# Patient Record
Sex: MALE | Race: WHITE | NOT HISPANIC OR LATINO | Employment: FULL TIME | URBAN - METROPOLITAN AREA
[De-identification: names, ages, dates, MRNs, and addresses within clinical notes are randomized per-mention and may not be internally consistent; named-entity substitution may affect disease eponyms.]

---

## 2017-03-10 ENCOUNTER — OFFICE VISIT (OUTPATIENT)
Dept: INTERNAL MEDICINE | Facility: CLINIC | Age: 57
End: 2017-03-10
Attending: INTERNAL MEDICINE
Payer: COMMERCIAL

## 2017-03-10 ENCOUNTER — HOSPITAL ENCOUNTER (OUTPATIENT)
Dept: RADIOLOGY | Facility: OTHER | Age: 57
Discharge: HOME | End: 2017-03-10
Attending: INTERNAL MEDICINE
Payer: COMMERCIAL

## 2017-03-10 VITALS
DIASTOLIC BLOOD PRESSURE: 84 MMHG | HEART RATE: 85 BPM | OXYGEN SATURATION: 92 % | WEIGHT: 315 LBS | HEIGHT: 70 IN | SYSTOLIC BLOOD PRESSURE: 124 MMHG | TEMPERATURE: 98 F | BODY MASS INDEX: 45.1 KG/M2

## 2017-03-10 DIAGNOSIS — J06.9 UPPER RESPIRATORY TRACT INFECTION, UNSPECIFIED TYPE: ICD-10-CM

## 2017-03-10 DIAGNOSIS — J06.9 UPPER RESPIRATORY TRACT INFECTION, UNSPECIFIED TYPE: Primary | ICD-10-CM

## 2017-03-10 PROCEDURE — 99213 OFFICE O/P EST LOW 20 MIN: CPT | Mod: 25,S$GLB,, | Performed by: INTERNAL MEDICINE

## 2017-03-10 PROCEDURE — 3079F DIAST BP 80-89 MM HG: CPT | Mod: S$GLB,,, | Performed by: INTERNAL MEDICINE

## 2017-03-10 PROCEDURE — 71020 XR CHEST PA AND LATERAL: CPT | Mod: TC

## 2017-03-10 PROCEDURE — 3074F SYST BP LT 130 MM HG: CPT | Mod: S$GLB,,, | Performed by: INTERNAL MEDICINE

## 2017-03-10 PROCEDURE — 99999 PR PBB SHADOW E&M-EST. PATIENT-LVL III: CPT | Mod: PBBFAC,,, | Performed by: INTERNAL MEDICINE

## 2017-03-10 PROCEDURE — 71020 XR CHEST PA AND LATERAL: CPT | Mod: 26,,, | Performed by: RADIOLOGY

## 2017-03-10 PROCEDURE — 1160F RVW MEDS BY RX/DR IN RCRD: CPT | Mod: S$GLB,,, | Performed by: INTERNAL MEDICINE

## 2017-03-10 PROCEDURE — 96372 THER/PROPH/DIAG INJ SC/IM: CPT | Mod: S$GLB,,, | Performed by: INTERNAL MEDICINE

## 2017-03-10 RX ORDER — CODEINE PHOSPHATE AND GUAIFENESIN 10; 100 MG/5ML; MG/5ML
5-10 SOLUTION ORAL EVERY 4 HOURS PRN
Qty: 118 ML | Refills: 0 | Status: SHIPPED | OUTPATIENT
Start: 2017-03-10 | End: 2017-03-20

## 2017-03-10 RX ORDER — TRIAMCINOLONE ACETONIDE 40 MG/ML
40 INJECTION, SUSPENSION INTRA-ARTICULAR; INTRAMUSCULAR
Status: COMPLETED | OUTPATIENT
Start: 2017-03-10 | End: 2017-03-10

## 2017-03-10 RX ADMIN — TRIAMCINOLONE ACETONIDE 40 MG: 40 INJECTION, SUSPENSION INTRA-ARTICULAR; INTRAMUSCULAR at 11:03

## 2017-03-10 NOTE — PROGRESS NOTES
"Subjective:       Patient ID: Luís Torres is a 57 y.o. male.    Chief Complaint: URI    HPI Comments: Here for urgent visit    5 days prior started with post nasal drip, sore throat, cough-mostly dry and occasionally productive. Subjective fevers at night but has not checked temp. Taking cough syrup and mucinex. Requesting refill of cough syrup b/c it is several years . It is effective though.  Patient denies F/C, SOB, eye pain, severe HA, or inability to maintain adequate PO intake.         Review of Systems    Objective:      Vitals:    03/10/17 1108   BP: 124/84   Pulse: 85   Temp: 98.1 °F (36.7 °C)   TempSrc: Oral   SpO2: (!) 92%   Weight: (!) 148.1 kg (326 lb 8 oz)   Height: 5' 10" (1.778 m)      Physical Exam   Constitutional: He is oriented to person, place, and time. He appears well-developed and well-nourished.   HENT:   Head: Normocephalic and atraumatic.   Right Ear: Tympanic membrane, external ear and ear canal normal.   Left Ear: Tympanic membrane, external ear and ear canal normal.   Nose: No mucosal edema or rhinorrhea.   Mouth/Throat: Posterior oropharyngeal edema and posterior oropharyngeal erythema present. No oropharyngeal exudate.   Eyes: Conjunctivae and EOM are normal.   Pulmonary/Chest: Effort normal and breath sounds normal. No respiratory distress. He has no decreased breath sounds. He has no wheezes. He has no rales.   Abdominal: He exhibits no distension.   Musculoskeletal: He exhibits no edema.   Lymphadenopathy:     He has no cervical adenopathy.   Neurological: He is alert and oriented to person, place, and time.   Skin: Skin is warm and dry. No rash noted.       Assessment:       1. Upper respiratory tract infection, unspecified type        Plan:       Luís was seen today for uri.    Diagnoses and all orders for this visit:    Upper respiratory tract infection, unspecified type  -02 hovered 92-97% during our visit. This and subjective fevers, will rule out CAP despite normal " exam. Supportive care and office prompts discussed.  -     X-Ray Chest PA And Lateral; Future  -     guaifenesin-codeine 100-10 mg/5 ml (TUSSI-ORGANIDIN NR)  mg/5 mL syrup; Take 5-10 mLs by mouth every 4 (four) hours as needed for Cough. Max 60mL/24 hours  -     triamcinolone acetonide injection 40 mg; Inject 1 mL (40 mg total) into the muscle one time.           Side effects of medication(s) were discussed in detail and patient voiced understanding.  Patient will call back for any issues or complications.

## 2017-03-10 NOTE — MR AVS SNAPSHOT
[facility]  [address]  [facility] [address]  Phone: [phone]  Fax: [phone]                  Luís Torres   3/10/2017 10:40 AM   Office Visit    Description:  Male : 1960   Provider:  Geoff Hunt MD   Department:  [facility]           Reason for Visit     URI           Diagnoses this Visit        Comments    Upper respiratory tract infection, unspecified type    -  Primary            To Do List           Future Appointments        Provider Department Dept Phone    3/10/2017 3:45 PM [facility] XROP  [facility] [phone]      Goals (5 Years of Data)     None       These Medications        Disp Refills Start End    guaifenesin-codeine 100-10 mg/5 ml (TUSSI-ORGANIDIN NR)  mg/5 mL syrup 118 mL 0 3/10/2017 3/20/2017    Take 5-10 mLs by mouth every 4 (four) hours as needed for Cough. Max 60mL/24 hours - Oral    Pharmacy: [facility] [address] [facility] [address] AT [facility] #: [phone]         [facility]     [facility] Care [facility] -  Assistance  Registered nurses in the [facility] provide clinical advisement, health education, appointment booking, and other advisory services.  Call for this free service at [phone].             Medications           Message regarding Medications     Verify the changes and/or additions to your medication regime listed below are the same as discussed with your clinician today.  If any of these changes or additions are incorrect, please notify your healthcare provider.        START taking these NEW medications        Refills    guaifenesin-codeine 100-10 mg/5 ml (TUSSI-ORGANIDIN NR)  mg/5 mL syrup 0    Sig: Take 5-10 mLs by mouth every 4 (four) hours as needed for Cough. Max 60mL/24 hours    Class: Normal    Route: Oral      These medications were administered today        Dose Freq     "triamcinolone acetonide injection 40 mg 40 mg Clinic/HOD 1 time    Sig: Inject 1 mL (40 mg total) into the muscle one time.    Class: Normal    Route: Intramuscular           Verify that the below list of medications is an accurate representation of the medications you are currently taking.  If none reported, the list may be blank. If incorrect, please contact your healthcare provider. Carry this list with you in case of emergency.           Current Medications     atorvastatin (LIPITOR) 20 MG tablet TAKE 1 TABLET BY MOUTH EVERY DAY    clotrimazole-betamethasone 1-0.05% (LOTRISONE) cream APPLY TOPICALLY TWICE DAILY    lisinopril (PRINIVIL,ZESTRIL) 20 MG tablet TAKE 1 TABLET BY MOUTH EVERY DAY    metformin (GLUCOPHAGE) 500 MG tablet TAKE 2 TABLETS BY MOUTH TWICE DAILY WITH MEALS    blood sugar diagnostic (ONETOUCH VERIO) Strp TEST ONCE DAILY    CIALIS 20 mg Tab TAKE 1 TABLET BY MOUTH EVERY 36 HOURS    guaifenesin-codeine 100-10 mg/5 ml (TUSSI-ORGANIDIN NR)  mg/5 mL syrup Take 5-10 mLs by mouth every 4 (four) hours as needed for Cough. Max 60mL/24 hours    lancets Misc Please dispense lancets for One Touch Verio.    ONETOUCH DELICA LANCETS 30 gauge Misc            Clinical Reference Information           Your Vitals Were     BP Pulse Temp Height Weight SpO2    124/84 85 98.1 °F (36.7 °C) (Oral) 5' 10" (1.778 m) 148.1 kg (326 lb 8 oz) 92%    BMI                46.85 kg/m2          Blood Pressure          Most Recent Value    BP  124/84      Allergies as of 3/10/2017     No Known Drug Allergies      Immunizations Administered on Date of Encounter - 3/10/2017     None      Orders Placed During Today's Visit     Future Labs/Procedures Expected by Expires    X-Ray Chest PA And Lateral  3/10/2017 3/10/2018      Administrations This Visit     triamcinolone acetonide injection 40 mg     Admin Date Action Dose Route Administered By             03/10/2017 Given 40 mg Intramuscular Kayley Ramos LPN                    "   Language Assistance Services     ATTENTION: Language assistance services are available, free of charge. Please call [phone].      ATENCIÓN: Si habla domenico, tiene a sheth disposición servicios gratuitos de asistencia lingüística. Llame al [phone].     CHÚ Ý: N?u b?n nói Ti?ng Vi?t, có các d?ch v? h? tr? ngôn ng? mi?n phí dành cho b?n. G?i s? [phone].         [facility] complies with applicable Federal civil rights laws and does not discriminate on the basis of race, color, national origin, age, disability, or sex.

## 2017-03-10 NOTE — PROGRESS NOTES
Patient given Kenalog 40mg IM in the [facility]. Patient tolerated well and Band-Aid was applied. Lot#[id number] Exp:06/01/2018. Patient advised to wait in the lobby for 15 min to make sure no adverse reactions occur. Patient states verbal understanding and has no further questions.

## 2017-04-11 RX ORDER — ATORVASTATIN CALCIUM 20 MG/1
TABLET, FILM COATED ORAL
Qty: 90 TABLET | Refills: 0 | Status: SHIPPED | OUTPATIENT
Start: 2017-04-11 | End: 2017-06-25 | Stop reason: SDUPTHER

## 2017-04-11 RX ORDER — LISINOPRIL 20 MG/1
TABLET ORAL
Qty: 90 TABLET | Refills: 0 | Status: SHIPPED | OUTPATIENT
Start: 2017-04-11 | End: 2017-06-25 | Stop reason: SDUPTHER

## 2017-04-13 RX ORDER — TADALAFIL 20 MG/1
TABLET, FILM COATED ORAL
Qty: 3 TABLET | Refills: 0 | Status: SHIPPED | OUTPATIENT
Start: 2017-04-13 | End: 2017-07-10 | Stop reason: SDUPTHER

## 2017-06-05 RX ORDER — CLOTRIMAZOLE AND BETAMETHASONE DIPROPIONATE 10; .64 MG/G; MG/G
CREAM TOPICAL
Qty: 45 G | Refills: 0 | Status: SHIPPED | OUTPATIENT
Start: 2017-06-05 | End: 2018-09-28 | Stop reason: SDUPTHER

## 2017-06-26 RX ORDER — LISINOPRIL 20 MG/1
TABLET ORAL
Qty: 90 TABLET | Refills: 0 | Status: SHIPPED | OUTPATIENT
Start: 2017-06-26 | End: 2017-09-22 | Stop reason: SDUPTHER

## 2017-06-26 RX ORDER — ATORVASTATIN CALCIUM 20 MG/1
TABLET, FILM COATED ORAL
Qty: 90 TABLET | Refills: 0 | Status: SHIPPED | OUTPATIENT
Start: 2017-06-26 | End: 2017-09-22 | Stop reason: SDUPTHER

## 2017-07-11 RX ORDER — TADALAFIL 20 MG/1
TABLET, FILM COATED ORAL
Qty: 3 TABLET | Refills: 0 | Status: SHIPPED | OUTPATIENT
Start: 2017-07-11 | End: 2019-09-06 | Stop reason: SDUPTHER

## 2017-09-25 RX ORDER — ATORVASTATIN CALCIUM 20 MG/1
TABLET, FILM COATED ORAL
Qty: 90 TABLET | Refills: 0 | Status: SHIPPED | OUTPATIENT
Start: 2017-09-25 | End: 2018-07-03

## 2017-09-25 RX ORDER — LISINOPRIL 20 MG/1
TABLET ORAL
Qty: 90 TABLET | Refills: 0 | Status: SHIPPED | OUTPATIENT
Start: 2017-09-25 | End: 2018-01-16 | Stop reason: SDUPTHER

## 2017-10-18 RX ORDER — METFORMIN HYDROCHLORIDE 500 MG/1
TABLET ORAL
Qty: 360 TABLET | Refills: 0 | Status: SHIPPED | OUTPATIENT
Start: 2017-10-18 | End: 2018-06-20 | Stop reason: SDUPTHER

## 2018-01-16 RX ORDER — LISINOPRIL 20 MG/1
TABLET ORAL
Qty: 90 TABLET | Refills: 0 | Status: SHIPPED | OUTPATIENT
Start: 2018-01-16 | End: 2018-07-02 | Stop reason: SDUPTHER

## 2018-01-16 RX ORDER — LISINOPRIL 20 MG/1
TABLET ORAL
Qty: 90 TABLET | Refills: 0 | Status: SHIPPED | OUTPATIENT
Start: 2018-01-16 | End: 2018-07-03

## 2018-01-18 RX ORDER — ATORVASTATIN CALCIUM 20 MG/1
TABLET, FILM COATED ORAL
Qty: 90 TABLET | Refills: 0 | Status: SHIPPED | OUTPATIENT
Start: 2018-01-18 | End: 2018-04-06 | Stop reason: SDUPTHER

## 2018-04-09 RX ORDER — ATORVASTATIN CALCIUM 20 MG/1
TABLET, FILM COATED ORAL
Qty: 90 TABLET | Refills: 0 | Status: SHIPPED | OUTPATIENT
Start: 2018-04-09 | End: 2018-07-02 | Stop reason: SDUPTHER

## 2018-04-26 ENCOUNTER — TELEPHONE (OUTPATIENT)
Dept: INTERNAL MEDICINE | Facility: CLINIC | Age: 58
End: 2018-04-26

## 2018-04-26 RX ORDER — TRAMADOL HYDROCHLORIDE 50 MG/1
50 TABLET ORAL EVERY 6 HOURS PRN
Qty: 25 TABLET | Refills: 0 | Status: SHIPPED | OUTPATIENT
Start: 2018-04-26 | End: 2018-05-06

## 2018-04-26 NOTE — TELEPHONE ENCOUNTER
----- Message from Byron Kuo sent at 4/26/2018 10:12 AM CDT -----  Contact: TAMARA STORY [[id number]]    Name of Who is Calling: TAMARA STORY [[id number]]      What is the request in detail: Patient would like to speak with staff in regards to getting a prescription for back pain. States he has taking aleve and tylenol with no relief.      Can the clinic reply by [facility]: no      What Number to Call Back if not in [facility]: [phone]

## 2018-06-21 RX ORDER — METFORMIN HYDROCHLORIDE 500 MG/1
TABLET ORAL
Qty: 360 TABLET | Refills: 0 | Status: SHIPPED | OUTPATIENT
Start: 2018-06-21 | End: 2019-02-01 | Stop reason: SDUPTHER

## 2018-07-03 RX ORDER — LISINOPRIL 20 MG/1
TABLET ORAL
Qty: 90 TABLET | Refills: 0 | Status: SHIPPED | OUTPATIENT
Start: 2018-07-03 | End: 2018-09-28 | Stop reason: SDUPTHER

## 2018-07-03 RX ORDER — ATORVASTATIN CALCIUM 20 MG/1
TABLET, FILM COATED ORAL
Qty: 90 TABLET | Refills: 0 | Status: SHIPPED | OUTPATIENT
Start: 2018-07-03 | End: 2018-09-28 | Stop reason: SDUPTHER

## 2018-07-25 ENCOUNTER — TELEPHONE (OUTPATIENT)
Dept: INTERNAL MEDICINE | Facility: CLINIC | Age: 58
End: 2018-07-25

## 2018-10-01 RX ORDER — CLOTRIMAZOLE AND BETAMETHASONE DIPROPIONATE 10; .64 MG/G; MG/G
CREAM TOPICAL
Qty: 45 G | Refills: 0 | Status: SHIPPED | OUTPATIENT
Start: 2018-10-01 | End: 2022-02-02

## 2018-10-01 RX ORDER — ATORVASTATIN CALCIUM 20 MG/1
TABLET, FILM COATED ORAL
Qty: 90 TABLET | Refills: 0 | Status: SHIPPED | OUTPATIENT
Start: 2018-10-01 | End: 2018-12-24 | Stop reason: SDUPTHER

## 2018-10-01 RX ORDER — LISINOPRIL 20 MG/1
TABLET ORAL
Qty: 90 TABLET | Refills: 0 | Status: SHIPPED | OUTPATIENT
Start: 2018-10-01 | End: 2018-12-24 | Stop reason: SDUPTHER

## 2018-12-24 RX ORDER — LISINOPRIL 20 MG/1
TABLET ORAL
Qty: 90 TABLET | Refills: 0 | Status: SHIPPED | OUTPATIENT
Start: 2018-12-24 | End: 2019-02-07 | Stop reason: SDUPTHER

## 2018-12-24 RX ORDER — ATORVASTATIN CALCIUM 20 MG/1
TABLET, FILM COATED ORAL
Qty: 90 TABLET | Refills: 0 | Status: SHIPPED | OUTPATIENT
Start: 2018-12-24 | End: 2019-02-07 | Stop reason: SDUPTHER

## 2019-01-25 RX ORDER — METFORMIN HYDROCHLORIDE 500 MG/1
TABLET ORAL
Qty: 360 TABLET | Refills: 0 | OUTPATIENT
Start: 2019-01-25

## 2019-02-01 ENCOUNTER — TELEPHONE (OUTPATIENT)
Dept: INTERNAL MEDICINE | Facility: CLINIC | Age: 59
End: 2019-02-01

## 2019-02-01 DIAGNOSIS — E11.9 TYPE 2 DIABETES MELLITUS WITHOUT COMPLICATION, WITH LONG-TERM CURRENT USE OF INSULIN: Primary | ICD-10-CM

## 2019-02-01 DIAGNOSIS — Z79.4 TYPE 2 DIABETES MELLITUS WITHOUT COMPLICATION, WITH LONG-TERM CURRENT USE OF INSULIN: Primary | ICD-10-CM

## 2019-02-01 RX ORDER — METFORMIN HYDROCHLORIDE 500 MG/1
1000 TABLET ORAL 2 TIMES DAILY WITH MEALS
Qty: 40 TABLET | Refills: 0 | Status: SHIPPED | OUTPATIENT
Start: 2019-02-01 | End: 2019-02-07 | Stop reason: SDUPTHER

## 2019-02-01 NOTE — TELEPHONE ENCOUNTER
----- Message from Cristopher Augustine sent at 2/1/2019 11:40 AM CST -----  Contact: TAMARA STORY [[id number]]  Can the clinic reply in [facility]: N    Please refill the medication(s) listed below.     Please call the patient when the prescription(s) is ready for  at the phone number   [phone]    Pt requesting small supply sent to pharmacy until appointment on 2.7.19. He is out of medication.    Medication #1: metFORMIN (GLUCOPHAGE) 500 MG tablet      Preferred Pharmacy:[facility] [address] [facility] [address] AT [facility] [place] [phone]

## 2019-02-01 NOTE — TELEPHONE ENCOUNTER
----- Message from Corrine Cali sent at 2/1/2019 10:38 AM CST -----  Contact: TAMARA STORY [[id number]]      Can the clinic reply in [facility]: no    Please refill the medication(s) listed below. Please call the patient when the prescription(s) is ready for  at this phone number    [phone] (home)     Medication #1metFORMIN (GLUCOPHAGE) 500 MG tablet     Preferred Pharmacy:   [facility] [address] [place] [address] [facility] [address]  [address] [place] [address]  Phone: [phone] Fax: [phone]

## 2019-02-07 ENCOUNTER — OFFICE VISIT (OUTPATIENT)
Dept: INTERNAL MEDICINE | Facility: CLINIC | Age: 59
End: 2019-02-07
Attending: FAMILY MEDICINE
Payer: COMMERCIAL

## 2019-02-07 VITALS
WEIGHT: 315 LBS | BODY MASS INDEX: 45.1 KG/M2 | HEIGHT: 70 IN | SYSTOLIC BLOOD PRESSURE: 122 MMHG | DIASTOLIC BLOOD PRESSURE: 84 MMHG | HEART RATE: 73 BPM | OXYGEN SATURATION: 95 %

## 2019-02-07 DIAGNOSIS — I10 HTN (HYPERTENSION), BENIGN: ICD-10-CM

## 2019-02-07 DIAGNOSIS — E78.5 HYPERLIPIDEMIA, UNSPECIFIED HYPERLIPIDEMIA TYPE: ICD-10-CM

## 2019-02-07 DIAGNOSIS — E11.9 TYPE 2 DIABETES MELLITUS WITHOUT COMPLICATION, WITH LONG-TERM CURRENT USE OF INSULIN: ICD-10-CM

## 2019-02-07 DIAGNOSIS — Z79.4 TYPE 2 DIABETES MELLITUS WITHOUT COMPLICATION, WITH LONG-TERM CURRENT USE OF INSULIN: ICD-10-CM

## 2019-02-07 DIAGNOSIS — Z00.00 PREVENTATIVE HEALTH CARE: Primary | ICD-10-CM

## 2019-02-07 PROCEDURE — 99396 PR PREVENTIVE VISIT,EST,40-64: ICD-10-PCS | Mod: S$GLB,,, | Performed by: FAMILY MEDICINE

## 2019-02-07 PROCEDURE — 99396 PREV VISIT EST AGE 40-64: CPT | Mod: S$GLB,,, | Performed by: FAMILY MEDICINE

## 2019-02-07 PROCEDURE — 99999 PR PBB SHADOW E&M-EST. PATIENT-LVL III: CPT | Mod: PBBFAC,,, | Performed by: FAMILY MEDICINE

## 2019-02-07 PROCEDURE — 99999 PR PBB SHADOW E&M-EST. PATIENT-LVL III: ICD-10-PCS | Mod: PBBFAC,,, | Performed by: FAMILY MEDICINE

## 2019-02-07 RX ORDER — AMOXICILLIN 500 MG
CAPSULE ORAL DAILY
COMMUNITY
End: 2022-02-02

## 2019-02-07 RX ORDER — ATORVASTATIN CALCIUM 20 MG/1
20 TABLET, FILM COATED ORAL DAILY
Qty: 90 TABLET | Refills: 3 | Status: SHIPPED | OUTPATIENT
Start: 2019-02-07 | End: 2020-03-03 | Stop reason: SDUPTHER

## 2019-02-07 RX ORDER — MULTIVITAMIN
1 TABLET ORAL DAILY
COMMUNITY
End: 2022-02-02

## 2019-02-07 RX ORDER — LISINOPRIL 20 MG/1
20 TABLET ORAL DAILY
Qty: 90 TABLET | Refills: 3 | Status: SHIPPED | OUTPATIENT
Start: 2019-02-07 | End: 2020-03-03 | Stop reason: SDUPTHER

## 2019-02-07 RX ORDER — METFORMIN HYDROCHLORIDE 500 MG/1
1000 TABLET ORAL 2 TIMES DAILY WITH MEALS
Qty: 360 TABLET | Refills: 3 | Status: SHIPPED | OUTPATIENT
Start: 2019-02-07 | End: 2020-03-03

## 2019-02-07 RX ORDER — POTASSIUM CHLORIDE 3 G/15ML
20 SOLUTION ORAL ONCE
COMMUNITY
End: 2022-02-02

## 2019-02-07 NOTE — PROGRESS NOTES
"CHIEF COMPLAINT:  Annual exam    HISTORY OF PRESENT ILLNESS: The patient is a 59 year-old white male well-known to me.      He was seen years ago for his diabetes and hypertension.  He is doing well overall.      The patient has a history of hypertension.  Patient denies chest pain or shortness of breath today.    The patient has a history of hyperlipidemia.  The patient denies chest pain or shortness of breath today.    The patient has a history of diabetes.  He is tolerating his metformin very well.  There have been no episodes of hypoglycemia.    REVIEW OF SYSTEMS:  GENERAL: No fever, chills, fatigability or weight loss.  SKIN: No rashes, itching or changes in color or texture of skin.  HEAD: No headaches or recent head trauma.  EYES: Visual acuity fine. No photophobia, ocular pain or diplopia.  EARS: Denies ear pain, discharge or vertigo.  NOSE: No loss of smell, no epistaxis   MOUTH & THROAT: No hoarseness or change in voice. No excessive gum bleeding.  NODES: Denies swollen glands.  CHEST: Denies MICHAEL, cyanosis, wheezing, cough and sputum production.  CARDIOVASCULAR: Denies chest pain, PND, orthopnea or reduced exercise tolerance.  ABDOMEN: Appetite fine. No weight loss. Denies diarrhea, abdominal pain, hematemesis or blood in stool.  URINARY: No flank pain, dysuria or hematuria.  PERIPHERAL VASCULAR: No claudication or cyanosis.  MUSCULOSKELETAL: No joint stiffness or swelling. He's having some right flank pain  NEUROLOGIC: No history of seizures, paralysis, alteration of gait or coordination.    SOCIAL HISTORY: The patient does not smoke.  The patient consumes alcohol socially.  The patient is .  He works at [employer] for 38 years    PHYSICAL EXAMINATION:     Blood pressure 122/84, pulse 73, height 5' 10" (1.778 m), weight (!) 143.4 kg (316 lb 2.2 oz), SpO2 95 %.    APPEARANCE: Well nourished, well developed, in no acute distress.    HEAD: Normocephalic, atraumatic.  EYES: PERRL. EOMI.  Conjunctivae " without injection and  anicteric  EARS: TM's intact. Light reflex normal. No retraction or perforation.    NOSE: Mucosa pink. Airway clear.  MOUTH & THROAT: No tonsillar enlargement. No pharyngeal erythema or exudate. No stridor.  NECK: Supple.   NODES: No cervical, axillary or inguinal lymph node enlargement.  CHEST: Lungs clear to auscultation.  No retractions are noted.  No rales or rhonchi are present.  CARDIOVASCULAR: Normal S1, S2. No rubs, murmurs or gallops.  ABDOMEN: Bowel sounds normal. Not distended. Soft. No tenderness or masses.  No ascites is noted.  MUSCULOSKELETAL:  There is no clubbing, cyanosis, or edema of the extremities x4.  There is full range of motion of the lumbar spine.  There is full range of motion of the extremities x4.  There is no deformity noted.  There is point tenderness over the radial head.  There is no edema.  There is no deformity.  NEUROLOGIC:       Normal speech development.      Hearing normal.      Normal gait.      Motor and sensory exams grossly normal.      DTR's normal.  PSYCHIATRIC: Patient is alert and oriented x3.  Thought processes are all normal.  There is no homicidality.  There is no suicidality.  There is no evidence of psychosis.    LABORATORY/RADIOLOGY:   Chart reviewed.  We will update blood work At this visit.    ASSESSMENT:   Annual exam  Type 2 diabetes, hypertension, hyperlipidemia    PLAN:  We will follow-up blood work which we expect to be normal.    lisinopril and metformin  Return to clinic in 6 months

## 2019-02-08 DIAGNOSIS — E11.9 TYPE 2 DIABETES MELLITUS WITHOUT COMPLICATION: ICD-10-CM

## 2019-02-08 LAB
ALBUMIN SERPL-MCNC: 4.4 G/DL (ref 3.6–5.1)
ALBUMIN/GLOB SERPL: 2.2 (CALC) (ref 1–2.5)
ALP SERPL-CCNC: 42 U/L (ref 40–115)
ALT SERPL-CCNC: 19 U/L (ref 9–46)
AST SERPL-CCNC: 18 U/L (ref 10–35)
BASOPHILS # BLD AUTO: 31 CELLS/UL (ref 0–200)
BASOPHILS NFR BLD AUTO: 0.6 %
BILIRUB SERPL-MCNC: 1.2 MG/DL (ref 0.2–1.2)
BUN SERPL-MCNC: 16 MG/DL (ref 7–25)
BUN/CREAT SERPL: ABNORMAL (CALC) (ref 6–22)
CALCIUM SERPL-MCNC: 9.5 MG/DL (ref 8.6–10.3)
CHLORIDE SERPL-SCNC: 101 MMOL/L (ref 98–110)
CHOLEST SERPL-MCNC: 172 MG/DL
CHOLEST/HDLC SERPL: 5.2 (CALC)
CO2 SERPL-SCNC: 25 MMOL/L (ref 20–32)
CREAT SERPL-MCNC: 0.78 MG/DL (ref 0.7–1.33)
EOSINOPHIL # BLD AUTO: 71 CELLS/UL (ref 15–500)
EOSINOPHIL NFR BLD AUTO: 1.4 %
ERYTHROCYTE [DISTWIDTH] IN BLOOD BY AUTOMATED COUNT: 12.9 % (ref 11–15)
GFRSERPLBLD MDRD-ARVRAT: 99 ML/MIN/1.73M2
GLOBULIN SER CALC-MCNC: 2 G/DL (CALC) (ref 1.9–3.7)
GLUCOSE SERPL-MCNC: 239 MG/DL (ref 65–99)
HBA1C MFR BLD: 9.9 % OF TOTAL HGB
HCT VFR BLD AUTO: 46 % (ref 38.5–50)
HDLC SERPL-MCNC: 33 MG/DL
HGB BLD-MCNC: 16 G/DL (ref 13.2–17.1)
LDLC SERPL CALC-MCNC: ABNORMAL MG/DL (CALC)
LYMPHOCYTES # BLD AUTO: 1632 CELLS/UL (ref 850–3900)
LYMPHOCYTES NFR BLD AUTO: 32 %
MCH RBC QN AUTO: 31.6 PG (ref 27–33)
MCHC RBC AUTO-ENTMCNC: 34.8 G/DL (ref 32–36)
MCV RBC AUTO: 90.7 FL (ref 80–100)
MONOCYTES # BLD AUTO: 439 CELLS/UL (ref 200–950)
MONOCYTES NFR BLD AUTO: 8.6 %
NEUTROPHILS # BLD AUTO: 2927 CELLS/UL (ref 1500–7800)
NEUTROPHILS NFR BLD AUTO: 57.4 %
NONHDLC SERPL-MCNC: 139 MG/DL (CALC)
PLATELET # BLD AUTO: 213 THOUSAND/UL (ref 140–400)
PMV BLD REES-ECKER: 9.9 FL (ref 7.5–12.5)
POTASSIUM SERPL-SCNC: 4.2 MMOL/L (ref 3.5–5.3)
PROT SERPL-MCNC: 6.4 G/DL (ref 6.1–8.1)
RBC # BLD AUTO: 5.07 MILLION/UL (ref 4.2–5.8)
SODIUM SERPL-SCNC: 137 MMOL/L (ref 135–146)
TRIGL SERPL-MCNC: 409 MG/DL
TSH SERPL-ACNC: 1.18 MIU/L (ref 0.4–4.5)
WBC # BLD AUTO: 5.1 THOUSAND/UL (ref 3.8–10.8)

## 2019-02-10 ENCOUNTER — PATIENT MESSAGE (OUTPATIENT)
Dept: INTERNAL MEDICINE | Facility: CLINIC | Age: 59
End: 2019-02-10

## 2019-02-11 ENCOUNTER — PATIENT MESSAGE (OUTPATIENT)
Dept: INTERNAL MEDICINE | Facility: CLINIC | Age: 59
End: 2019-02-11

## 2019-09-06 DIAGNOSIS — E11.00 TYPE 2 DIABETES MELLITUS WITH HYPEROSMOLARITY WITHOUT COMA, WITHOUT LONG-TERM CURRENT USE OF INSULIN: Primary | ICD-10-CM

## 2019-09-06 RX ORDER — LANCETS
EACH MISCELLANEOUS
Qty: 100 EACH | Refills: 0 | Status: SHIPPED | OUTPATIENT
Start: 2019-09-06 | End: 2022-02-02

## 2019-09-06 RX ORDER — TADALAFIL 20 MG/1
20 TABLET ORAL DAILY PRN
Qty: 3 TABLET | Refills: 0 | Status: SHIPPED | OUTPATIENT
Start: 2019-09-06 | End: 2019-12-06 | Stop reason: SDUPTHER

## 2019-12-06 RX ORDER — TADALAFIL 20 MG/1
20 TABLET ORAL DAILY PRN
Qty: 5 TABLET | Refills: 0 | Status: SHIPPED | OUTPATIENT
Start: 2019-12-06 | End: 2021-01-29 | Stop reason: SDUPTHER

## 2020-03-02 DIAGNOSIS — E11.9 TYPE 2 DIABETES MELLITUS WITHOUT COMPLICATION, WITH LONG-TERM CURRENT USE OF INSULIN: ICD-10-CM

## 2020-03-02 DIAGNOSIS — Z79.4 TYPE 2 DIABETES MELLITUS WITHOUT COMPLICATION, WITH LONG-TERM CURRENT USE OF INSULIN: ICD-10-CM

## 2020-03-03 DIAGNOSIS — E78.5 HYPERLIPIDEMIA, UNSPECIFIED HYPERLIPIDEMIA TYPE: ICD-10-CM

## 2020-03-03 RX ORDER — ATORVASTATIN CALCIUM 20 MG/1
20 TABLET, FILM COATED ORAL DAILY
Qty: 90 TABLET | Refills: 3 | Status: SHIPPED | OUTPATIENT
Start: 2020-03-03 | End: 2021-02-08 | Stop reason: SDUPTHER

## 2020-03-03 RX ORDER — METFORMIN HYDROCHLORIDE 500 MG/1
TABLET ORAL
Qty: 360 TABLET | Refills: 0 | Status: SHIPPED | OUTPATIENT
Start: 2020-03-03 | End: 2022-02-02

## 2020-03-03 RX ORDER — LISINOPRIL 20 MG/1
20 TABLET ORAL DAILY
Qty: 90 TABLET | Refills: 3 | Status: SHIPPED | OUTPATIENT
Start: 2020-03-03 | End: 2021-04-19 | Stop reason: SDUPTHER

## 2020-04-08 ENCOUNTER — PATIENT MESSAGE (OUTPATIENT)
Dept: INTERNAL MEDICINE | Facility: CLINIC | Age: 60
End: 2020-04-08

## 2020-04-08 RX ORDER — MUPIROCIN 20 MG/G
OINTMENT TOPICAL 3 TIMES DAILY
Qty: 30 G | Refills: 3 | Status: SHIPPED | OUTPATIENT
Start: 2020-04-08 | End: 2020-04-18

## 2020-04-28 ENCOUNTER — TELEPHONE (OUTPATIENT)
Dept: INTERNAL MEDICINE | Facility: CLINIC | Age: 60
End: 2020-04-28

## 2020-04-28 NOTE — TELEPHONE ENCOUNTER
Left a detailed message for patient to call back . [facility] is committed to your overall health. Our records indicate that you are due for an annual checkup with your primary care provider, Dr.Christopher Salas.  Please call [phone] to schedule a routine physical exam.    Thanks for choosing [facility].

## 2020-05-12 ENCOUNTER — PATIENT MESSAGE (OUTPATIENT)
Dept: ADMINISTRATIVE | Facility: HOSPITAL | Age: 60
End: 2020-05-12

## 2020-05-12 ENCOUNTER — PATIENT MESSAGE (OUTPATIENT)
Dept: INTERNAL MEDICINE | Facility: CLINIC | Age: 60
End: 2020-05-12

## 2020-05-19 ENCOUNTER — OFFICE VISIT (OUTPATIENT)
Dept: PODIATRY | Facility: CLINIC | Age: 60
End: 2020-05-19
Payer: COMMERCIAL

## 2020-05-19 ENCOUNTER — APPOINTMENT (OUTPATIENT)
Dept: RADIOLOGY | Facility: OTHER | Age: 60
End: 2020-05-19
Attending: PODIATRIST
Payer: COMMERCIAL

## 2020-05-19 VITALS
TEMPERATURE: 98 F | BODY MASS INDEX: 44.51 KG/M2 | HEIGHT: 70 IN | SYSTOLIC BLOOD PRESSURE: 134 MMHG | HEART RATE: 77 BPM | WEIGHT: 310.88 LBS | DIASTOLIC BLOOD PRESSURE: 75 MMHG

## 2020-05-19 DIAGNOSIS — M79.671 RIGHT FOOT PAIN: Primary | ICD-10-CM

## 2020-05-19 DIAGNOSIS — S91.301A OPEN WOUND OF RIGHT FOOT, INITIAL ENCOUNTER: ICD-10-CM

## 2020-05-19 DIAGNOSIS — M79.671 RIGHT FOOT PAIN: ICD-10-CM

## 2020-05-19 DIAGNOSIS — E11.9 TYPE 2 DIABETES MELLITUS WITHOUT COMPLICATION, WITHOUT LONG-TERM CURRENT USE OF INSULIN: ICD-10-CM

## 2020-05-19 PROCEDURE — 73630 XR FOOT COMPLETE 3 VIEW RIGHT: ICD-10-PCS | Mod: 26,RT,, | Performed by: RADIOLOGY

## 2020-05-19 PROCEDURE — 99202 OFFICE O/P NEW SF 15 MIN: CPT | Mod: S$GLB,,, | Performed by: PODIATRIST

## 2020-05-19 PROCEDURE — 99202 PR OFFICE/OUTPT VISIT, NEW, LEVL II, 15-29 MIN: ICD-10-PCS | Mod: S$GLB,,, | Performed by: PODIATRIST

## 2020-05-19 PROCEDURE — 73630 X-RAY EXAM OF FOOT: CPT | Mod: 26,RT,, | Performed by: RADIOLOGY

## 2020-05-19 PROCEDURE — 99999 PR PBB SHADOW E&M-EST. PATIENT-LVL IV: ICD-10-PCS | Mod: PBBFAC,,, | Performed by: PODIATRIST

## 2020-05-19 PROCEDURE — 73630 X-RAY EXAM OF FOOT: CPT | Mod: TC,RT

## 2020-05-19 PROCEDURE — 99999 PR PBB SHADOW E&M-EST. PATIENT-LVL IV: CPT | Mod: PBBFAC,,, | Performed by: PODIATRIST

## 2020-05-19 NOTE — PROGRESS NOTES
Subjective:      Patient ID: Luís Torres is a 60 y.o. male.    Chief Complaint: Foot Problem (pt states he dropped a bottle on his right foot 3 weeks ago. )    Pain with sore top right foot.  Rapid onset dropping bottle of alcohol on top of foot two weeks ago.  Slowly improving since.  Gradual onset, worsening over past several weeks, aggravated by increased weight bearing, shoe gear, pressure.  No previous medical treatment.  OTC pain med not helping.     Review of Systems   Constitution: Negative for chills, diaphoresis, fever, malaise/fatigue and night sweats.   Cardiovascular: Negative for claudication, cyanosis, leg swelling and syncope.   Skin: Positive for poor wound healing. Negative for color change, dry skin, nail changes, rash, suspicious lesions and unusual hair distribution.   Musculoskeletal: Negative for falls, joint pain, joint swelling, muscle cramps, muscle weakness and stiffness.   Gastrointestinal: Negative for constipation, diarrhea, nausea and vomiting.   Neurological: Negative for brief paralysis, disturbances in coordination, focal weakness, numbness, paresthesias, sensory change and tremors.           Objective:      Physical Exam   Constitutional: He is oriented to person, place, and time. He appears well-developed and well-nourished. He is cooperative. No distress.   Cardiovascular:   Pulses:       Popliteal pulses are 2+ on the right side, and 2+ on the left side.        Dorsalis pedis pulses are 2+ on the right side, and 2+ on the left side.        Posterior tibial pulses are 2+ on the right side, and 2+ on the left side.   Capillary refill 3 seconds all toes/distal feet, all toes/both feet warm to touch.      Negative lymphadenopathy bilateral popliteal fossa and tarsal tunnel.      Negavie lower extremity edema bilateral.     Musculoskeletal:        Right ankle: He exhibits normal range of motion, no swelling, no ecchymosis, no deformity, no laceration and normal pulse. Achilles  tendon normal. Achilles tendon exhibits no pain, no defect and normal Hazel's test results.   Normal angle, base, station of gait. All ten toes without clubbing, cyanosis, or signs of ischemia.  No pain to palpation bilateral lower extremities.  Range of motion, stability, muscle strength, and muscle tone normal bilateral feet and legs.    Lymphadenopathy: No inguinal adenopathy noted on the right or left side.   Negative lymphadenopathy bilateral popliteal fossa and tarsal tunnel.    Negative lymphangitic streaking bilateral feet/ankles/legs.   Neurological: He is alert and oriented to person, place, and time. He has normal strength. He displays no atrophy and no tremor. No sensory deficit. He exhibits normal muscle tone. Gait normal.   Reflex Scores:       Patellar reflexes are 2+ on the right side and 2+ on the left side.       Achilles reflexes are 2+ on the right side and 2+ on the left side.  Negative tinel sign to percussion sural, superficial peroneal, deep peroneal, saphenous, and posterior tibial nerves right and left ankles and feet.    Negative allodynia both feet.   Skin: Skin is warm, dry and intact. Capillary refill takes 2 to 3 seconds. No abrasion, no bruising, no burn, no ecchymosis, no laceration, no lesion and no rash noted. He is not diaphoretic. No cyanosis or erythema. No pallor. Nails show no clubbing.     Wound:  Dorsal right forefoot     Size:    Pre:[id number]      Base:  Stable brown/black eschar without drainage.  No pus, tracking, fluctuance, malodor, or cardinal signs infection.    Borders:   flat, pink, blanchable skin edges without undermining.      Otherwise, Skin is normal age and health appropriate color, turgor, texture, and temperature bilateral lower extremities without ulceration, hyperpigmentation, discoloration, masses nodules or cords palpated.  No ecchymosis, erythema, edema, or cardinal signs of infection bilateral lower extremities.       Psychiatric: He has a normal  mood and affect.             Assessment:       No diagnosis found.      Plan:       There are no diagnoses linked to this encounter.  I counseled the patient on his conditions, their implications and medical management.        Swab with betadine once daily wound top right foot.      Cover with band aid right wound changing daily.    Avoid shoes that put pressure on that area top right foot.    Dispense sx shoe right.    Ambulate minimally casual shoe left, sx shoe right.    Xray right foot WB 3 views.    2 weeks follow up, sooner prn.    Inspect feet multiple times daily for signs of occurrence/recurrence ulceration.           No follow-ups on file.

## 2020-06-03 ENCOUNTER — PATIENT OUTREACH (OUTPATIENT)
Dept: ADMINISTRATIVE | Facility: HOSPITAL | Age: 60
End: 2020-06-03

## 2020-06-03 DIAGNOSIS — E11.00 TYPE 2 DIABETES MELLITUS WITH HYPEROSMOLARITY WITHOUT COMA, WITHOUT LONG-TERM CURRENT USE OF INSULIN: Primary | ICD-10-CM

## 2020-06-09 ENCOUNTER — OFFICE VISIT (OUTPATIENT)
Dept: PODIATRY | Facility: CLINIC | Age: 60
End: 2020-06-09
Payer: COMMERCIAL

## 2020-06-09 VITALS
BODY MASS INDEX: 44.38 KG/M2 | WEIGHT: 310 LBS | HEART RATE: 77 BPM | SYSTOLIC BLOOD PRESSURE: 138 MMHG | HEIGHT: 70 IN | TEMPERATURE: 98 F | DIASTOLIC BLOOD PRESSURE: 85 MMHG

## 2020-06-09 DIAGNOSIS — S91.301D OPEN WOUND OF RIGHT FOOT, SUBSEQUENT ENCOUNTER: ICD-10-CM

## 2020-06-09 DIAGNOSIS — Z87.898 HISTORY OF ULCERATION: ICD-10-CM

## 2020-06-09 DIAGNOSIS — E11.9 TYPE 2 DIABETES MELLITUS WITHOUT COMPLICATION, WITHOUT LONG-TERM CURRENT USE OF INSULIN: Primary | ICD-10-CM

## 2020-06-09 PROCEDURE — 99999 PR PBB SHADOW E&M-EST. PATIENT-LVL III: CPT | Mod: PBBFAC,,, | Performed by: PODIATRIST

## 2020-06-09 PROCEDURE — 99999 PR PBB SHADOW E&M-EST. PATIENT-LVL III: ICD-10-PCS | Mod: PBBFAC,,, | Performed by: PODIATRIST

## 2020-06-09 PROCEDURE — 99213 OFFICE O/P EST LOW 20 MIN: CPT | Mod: S$GLB,,, | Performed by: PODIATRIST

## 2020-06-09 PROCEDURE — 99213 PR OFFICE/OUTPT VISIT, EST, LEVL III, 20-29 MIN: ICD-10-PCS | Mod: S$GLB,,, | Performed by: PODIATRIST

## 2020-06-09 NOTE — PROGRESS NOTES
Subjective:      Patient ID: Luís Torres is a 60 y.o. male.    Chief Complaint: Foot Ulcer (Right Foot)    Pain with sore top right foot.  Rapid onset dropping bottle of alcohol on top of foot several weeks ago.  Slowly improving since.  Gradual onset, worsening over past several weeks, aggravated by increased weight bearing, shoe gear, pressure.  Local wound care and sx shoe offloading with activity change slowly improving condition.  xrays negative osseous erosion right forefoot.    Review of Systems   Constitution: Negative for chills, diaphoresis, fever, malaise/fatigue and night sweats.   Cardiovascular: Negative for claudication, cyanosis, leg swelling and syncope.   Skin: Positive for poor wound healing. Negative for color change, dry skin, nail changes, rash, suspicious lesions and unusual hair distribution.   Musculoskeletal: Negative for falls, joint pain, joint swelling, muscle cramps, muscle weakness and stiffness.   Gastrointestinal: Negative for constipation, diarrhea, nausea and vomiting.   Neurological: Negative for brief paralysis, disturbances in coordination, focal weakness, numbness, paresthesias, sensory change and tremors.           Objective:      Physical Exam   Constitutional: He is oriented to person, place, and time. He appears well-developed and well-nourished. He is cooperative. No distress.   Cardiovascular:   Pulses:       Popliteal pulses are 2+ on the right side, and 2+ on the left side.        Dorsalis pedis pulses are 2+ on the right side, and 2+ on the left side.        Posterior tibial pulses are 2+ on the right side, and 2+ on the left side.   Capillary refill 3 seconds all toes/distal feet, all toes/both feet warm to touch.      Negative lymphadenopathy bilateral popliteal fossa and tarsal tunnel.      Negavie lower extremity edema bilateral.     Musculoskeletal:        Right ankle: He exhibits normal range of motion, no swelling, no ecchymosis, no deformity, no laceration  and normal pulse. Achilles tendon normal. Achilles tendon exhibits no pain, no defect and normal Hazel's test results.   Normal angle, base, station of gait. All ten toes without clubbing, cyanosis, or signs of ischemia.  No pain to palpation bilateral lower extremities.  Range of motion, stability, muscle strength, and muscle tone normal bilateral feet and legs.    Lymphadenopathy: No inguinal adenopathy noted on the right or left side.   Negative lymphadenopathy bilateral popliteal fossa and tarsal tunnel.    Negative lymphangitic streaking bilateral feet/ankles/legs.   Neurological: He is alert and oriented to person, place, and time. He has normal strength. He displays no atrophy and no tremor. No sensory deficit. He exhibits normal muscle tone. Gait normal.   Reflex Scores:       Patellar reflexes are 2+ on the right side and 2+ on the left side.       Achilles reflexes are 2+ on the right side and 2+ on the left side.  Negative tinel sign to percussion sural, superficial peroneal, deep peroneal, saphenous, and posterior tibial nerves right and left ankles and feet.    Negative allodynia both feet.   Skin: Skin is warm, dry and intact. Capillary refill takes 2 to 3 seconds. No abrasion, no bruising, no burn, no ecchymosis, no laceration, no lesion and no rash noted. He is not diaphoretic. No cyanosis or erythema. No pallor. Nails show no clubbing.     Wound dorsal lateral right forefoot closed today, epithelialized  without ulceration, drainage, pus, tracking, fluctuance, malodor, or cardinal signs infection.        Otherwise, Skin is normal age and health appropriate color, turgor, texture, and temperature bilateral lower extremities without ulceration, hyperpigmentation, discoloration, masses nodules or cords palpated.  No ecchymosis, erythema, edema, or cardinal signs of infection bilateral lower extremities.       Psychiatric: He has a normal mood and affect.             Assessment:       Encounter  Diagnoses   Name Primary?    Type 2 diabetes mellitus without complication, without long-term current use of insulin Yes    Open wound of right foot, subsequent encounter     History of ulceration          Plan:       Luís was seen today for foot ulcer.    Diagnoses and all orders for this visit:    Type 2 diabetes mellitus without complication, without long-term current use of insulin    Open wound of right foot, subsequent encounter    History of ulceration      I counseled the patient on his conditions, their implications and medical management.        Swab with betadine once daily wound top right foot.      Cover with band aid right wound changing daily.    Avoid shoes that put pressure on that area top right foot.    Continue sx shoe right.    Ambulate minimally casual shoe left, sx shoe right.     follow up prn.    Inspect feet multiple times daily for signs of occurrence/recurrence ulceration.           Follow up in about 1 year (around 6/9/2021).

## 2020-06-18 ENCOUNTER — TELEPHONE (OUTPATIENT)
Dept: PRIMARY CARE CLINIC | Facility: CLINIC | Age: 60
End: 2020-06-18

## 2020-06-18 NOTE — TELEPHONE ENCOUNTER
Talk with pt inform him that I call to F/u on his Bp check pt stated he don't want to come in right now due to COVID.Sm

## 2020-09-14 ENCOUNTER — OFFICE VISIT (OUTPATIENT)
Dept: INTERNAL MEDICINE | Facility: CLINIC | Age: 60
End: 2020-09-14
Attending: FAMILY MEDICINE
Payer: COMMERCIAL

## 2020-09-14 DIAGNOSIS — Z79.4 TYPE 2 DIABETES MELLITUS WITHOUT COMPLICATION, WITH LONG-TERM CURRENT USE OF INSULIN: Primary | ICD-10-CM

## 2020-09-14 DIAGNOSIS — E11.9 TYPE 2 DIABETES MELLITUS WITHOUT COMPLICATION, WITH LONG-TERM CURRENT USE OF INSULIN: Primary | ICD-10-CM

## 2020-09-14 DIAGNOSIS — E78.5 HYPERLIPIDEMIA, UNSPECIFIED HYPERLIPIDEMIA TYPE: ICD-10-CM

## 2020-09-14 DIAGNOSIS — I10 HTN (HYPERTENSION), BENIGN: ICD-10-CM

## 2020-09-14 PROCEDURE — 99214 OFFICE O/P EST MOD 30 MIN: CPT | Mod: 95,,, | Performed by: FAMILY MEDICINE

## 2020-09-14 PROCEDURE — 99214 PR OFFICE/OUTPT VISIT, EST, LEVL IV, 30-39 MIN: ICD-10-PCS | Mod: 95,,, | Performed by: FAMILY MEDICINE

## 2020-09-14 NOTE — PROGRESS NOTES
The patient location is:  home  The chief complaint leading to consultation is:  Preop    Visit type: audiovisual    Face to Face time with patient:  12 min  Fifteen minutes of total time spent on the encounter, which includes face to face time and non-face to face time preparing to see the patient (eg, review of tests), Obtaining and/or reviewing separately obtained history, Documenting clinical information in the electronic or other health record, Independently interpreting results (not separately reported) and communicating results to the patient/family/caregiver, or Care coordination (not separately reported).         Each patient to whom he or she provides medical services by telemedicine is:  (1) informed of the relationship between the physician and patient and the respective role of any other health care provider with respect to management of the patient; and (2) notified that he or she may decline to receive medical services by telemedicine and may withdraw from such care at any time.    Notes:   CHIEF COMPLAINT:  Preop    HISTORY OF PRESENT ILLNESS: The patient is a 60 year-old white male well-known to me.  He is currently scheduled for right-sided cataract surgery soon.    He was seen years ago for his diabetes and hypertension.  He is doing well overall.      The patient has a history of hypertension.  Patient denies chest pain or shortness of breath today.    The patient has a history of hyperlipidemia.  The patient denies chest pain or shortness of breath today.    The patient has a history of diabetes.  He is tolerating his metformin very well.  There have been no episodes of hypoglycemia.    REVIEW OF SYSTEMS:  GENERAL: No fever, chills, fatigability or weight loss.  SKIN: No rashes, itching or changes in color or texture of skin.  HEAD: No headaches or recent head trauma.  EYES:  No photophobia, ocular pain or diplopia.  EARS: Denies ear pain, discharge or vertigo.  NOSE: No loss of smell, no  epistaxis   MOUTH & THROAT: No hoarseness or change in voice. No excessive gum bleeding.  NODES: Denies swollen glands.  CHEST: Denies MICHAEL, cyanosis, wheezing, cough and sputum production.  CARDIOVASCULAR: Denies chest pain, PND, orthopnea or reduced exercise tolerance.  ABDOMEN: Appetite fine. No weight loss. Denies diarrhea, abdominal pain, hematemesis or blood in stool.  URINARY: No flank pain, dysuria or hematuria.  PERIPHERAL VASCULAR: No claudication or cyanosis.  MUSCULOSKELETAL: No joint stiffness or swelling. He's having some right flank pain  NEUROLOGIC: No history of seizures, paralysis, alteration of gait or coordination.    SOCIAL HISTORY: The patient does not smoke.  The patient consumes alcohol socially.  The patient is .  He works at [employer] for 38 years    PHYSICAL EXAMINATION:     There were no vitals taken for this visit.    APPEARANCE: Well nourished, well developed, in no acute distress.    HEAD: Normocephalic, atraumatic.  EYES: PERRL. EOMI.  Conjunctivae without injection and  anicteric  NEUROLOGIC:       Normal speech development.      Hearing normal.  PSYCHIATRIC: Patient is alert and oriented x3.  Thought processes are all normal.  There is no homicidality.  There is no suicidality.  There is no evidence of psychosis.    LABORATORY/RADIOLOGY:   Chart reviewed.  We will update blood work At this visit.    ASSESSMENT:   Right sided cataract  Type 2 diabetes, hypertension, hyperlipidemia    PLAN:  He is medically optimized and cleared for cataract surgery.    lisinopril and metformin  Return to clinic in in a couple of months for wellness exam  Answers for HPI/ROS submitted by the patient on 9/14/2020   activity change: No  unexpected weight change: No  neck pain: No  hearing loss: No  rhinorrhea: No  trouble swallowing: No  eye discharge: No  visual disturbance: Yes  chest tightness: No  wheezing: No  chest pain: No  palpitations: No  blood in stool: No  constipation: No  vomiting:  No  diarrhea: No  polydipsia: No  polyuria: No  difficulty urinating: No  urgency: No  hematuria: No  joint swelling: No  arthralgias: Yes  headaches: No  weakness: No  confusion: No  dysphoric mood: No

## 2020-09-18 DIAGNOSIS — E11.9 TYPE 2 DIABETES MELLITUS WITHOUT COMPLICATION: ICD-10-CM

## 2020-10-07 ENCOUNTER — PATIENT MESSAGE (OUTPATIENT)
Dept: ADMINISTRATIVE | Facility: HOSPITAL | Age: 60
End: 2020-10-07

## 2020-10-08 ENCOUNTER — PATIENT OUTREACH (OUTPATIENT)
Dept: ADMINISTRATIVE | Facility: HOSPITAL | Age: 60
End: 2020-10-08

## 2020-11-16 DIAGNOSIS — E11.69 TYPE 2 DIABETES MELLITUS WITH OTHER SPECIFIED COMPLICATION, UNSPECIFIED WHETHER LONG TERM INSULIN USE: Primary | ICD-10-CM

## 2021-01-05 ENCOUNTER — PATIENT OUTREACH (OUTPATIENT)
Dept: ADMINISTRATIVE | Facility: HOSPITAL | Age: 61
End: 2021-01-05

## 2021-01-05 ENCOUNTER — PATIENT MESSAGE (OUTPATIENT)
Dept: ADMINISTRATIVE | Facility: HOSPITAL | Age: 61
End: 2021-01-05

## 2021-01-15 LAB
ALBUMIN SERPL-MCNC: 4.6 G/DL (ref 3.6–5.1)
ALBUMIN/CREAT UR: 17 MCG/MG CREAT
ALBUMIN/GLOB SERPL: 1.9 (CALC) (ref 1–2.5)
ALP SERPL-CCNC: 40 U/L (ref 35–144)
ALT SERPL-CCNC: 17 U/L (ref 9–46)
AST SERPL-CCNC: 17 U/L (ref 10–35)
BASOPHILS # BLD AUTO: 29 CELLS/UL (ref 0–200)
BASOPHILS NFR BLD AUTO: 0.6 %
BILIRUB SERPL-MCNC: 1 MG/DL (ref 0.2–1.2)
BUN SERPL-MCNC: 25 MG/DL (ref 7–25)
BUN/CREAT SERPL: ABNORMAL (CALC) (ref 6–22)
CALCIUM SERPL-MCNC: 9.7 MG/DL (ref 8.6–10.3)
CHLORIDE SERPL-SCNC: 100 MMOL/L (ref 98–110)
CHOLEST SERPL-MCNC: 224 MG/DL
CHOLEST/HDLC SERPL: 7.2 (CALC)
CO2 SERPL-SCNC: 25 MMOL/L (ref 20–32)
CREAT SERPL-MCNC: 0.96 MG/DL (ref 0.7–1.25)
CREAT UR-MCNC: 84 MG/DL (ref 20–320)
EOSINOPHIL # BLD AUTO: 53 CELLS/UL (ref 15–500)
EOSINOPHIL NFR BLD AUTO: 1.1 %
ERYTHROCYTE [DISTWIDTH] IN BLOOD BY AUTOMATED COUNT: 12.3 % (ref 11–15)
GFRSERPLBLD MDRD-ARVRAT: 86 ML/MIN/1.73M2
GLOBULIN SER CALC-MCNC: 2.4 G/DL (CALC) (ref 1.9–3.7)
GLUCOSE SERPL-MCNC: 254 MG/DL (ref 65–99)
HBA1C MFR BLD: 10.3 % OF TOTAL HGB
HCT VFR BLD AUTO: 50.1 % (ref 38.5–50)
HDLC SERPL-MCNC: 31 MG/DL
HGB BLD-MCNC: 16.9 G/DL (ref 13.2–17.1)
LDLC SERPL CALC-MCNC: ABNORMAL MG/DL (CALC)
LYMPHOCYTES # BLD AUTO: 1435 CELLS/UL (ref 850–3900)
LYMPHOCYTES NFR BLD AUTO: 29.9 %
MCH RBC QN AUTO: 31 PG (ref 27–33)
MCHC RBC AUTO-ENTMCNC: 33.7 G/DL (ref 32–36)
MCV RBC AUTO: 91.9 FL (ref 80–100)
MICROALBUMIN UR-MCNC: 1.4 MG/DL
MONOCYTES # BLD AUTO: 403 CELLS/UL (ref 200–950)
MONOCYTES NFR BLD AUTO: 8.4 %
NEUTROPHILS # BLD AUTO: 2880 CELLS/UL (ref 1500–7800)
NEUTROPHILS NFR BLD AUTO: 60 %
NONHDLC SERPL-MCNC: 193 MG/DL (CALC)
PLATELET # BLD AUTO: 210 THOUSAND/UL (ref 140–400)
PMV BLD REES-ECKER: 9.9 FL (ref 7.5–12.5)
POTASSIUM SERPL-SCNC: 4.6 MMOL/L (ref 3.5–5.3)
PROT SERPL-MCNC: 7 G/DL (ref 6.1–8.1)
RBC # BLD AUTO: 5.45 MILLION/UL (ref 4.2–5.8)
SODIUM SERPL-SCNC: 136 MMOL/L (ref 135–146)
TRIGL SERPL-MCNC: 791 MG/DL
TSH SERPL-ACNC: 0.99 MIU/L (ref 0.4–4.5)
WBC # BLD AUTO: 4.8 THOUSAND/UL (ref 3.8–10.8)

## 2021-01-22 DIAGNOSIS — E78.5 HYPERLIPIDEMIA, UNSPECIFIED HYPERLIPIDEMIA TYPE: ICD-10-CM

## 2021-01-29 RX ORDER — TADALAFIL 20 MG/1
20 TABLET ORAL DAILY PRN
Qty: 5 TABLET | Refills: 0 | Status: SHIPPED | OUTPATIENT
Start: 2021-01-29 | End: 2022-02-02 | Stop reason: SDUPTHER

## 2021-02-01 ENCOUNTER — PATIENT OUTREACH (OUTPATIENT)
Dept: ADMINISTRATIVE | Facility: HOSPITAL | Age: 61
End: 2021-02-01

## 2021-02-08 RX ORDER — ATORVASTATIN CALCIUM 20 MG/1
20 TABLET, FILM COATED ORAL DAILY
Qty: 90 TABLET | Refills: 3 | Status: SHIPPED | OUTPATIENT
Start: 2021-02-08 | End: 2022-02-02 | Stop reason: SDUPTHER

## 2021-04-05 ENCOUNTER — PATIENT MESSAGE (OUTPATIENT)
Dept: ADMINISTRATIVE | Facility: HOSPITAL | Age: 61
End: 2021-04-05

## 2021-04-16 ENCOUNTER — PATIENT MESSAGE (OUTPATIENT)
Dept: RESEARCH | Facility: HOSPITAL | Age: 61
End: 2021-04-16

## 2021-04-19 ENCOUNTER — PATIENT MESSAGE (OUTPATIENT)
Dept: INTERNAL MEDICINE | Facility: CLINIC | Age: 61
End: 2021-04-19

## 2021-04-19 RX ORDER — LISINOPRIL 20 MG/1
20 TABLET ORAL DAILY
Qty: 90 TABLET | Refills: 3 | Status: SHIPPED | OUTPATIENT
Start: 2021-04-19 | End: 2022-02-02 | Stop reason: SDUPTHER

## 2021-08-04 ENCOUNTER — PATIENT MESSAGE (OUTPATIENT)
Dept: ADMINISTRATIVE | Facility: HOSPITAL | Age: 61
End: 2021-08-04

## 2021-08-08 ENCOUNTER — PATIENT MESSAGE (OUTPATIENT)
Dept: INTERNAL MEDICINE | Facility: CLINIC | Age: 61
End: 2021-08-08

## 2021-08-09 ENCOUNTER — PATIENT MESSAGE (OUTPATIENT)
Dept: INTERNAL MEDICINE | Facility: CLINIC | Age: 61
End: 2021-08-09

## 2021-08-09 DIAGNOSIS — E78.5 HYPERLIPIDEMIA, UNSPECIFIED HYPERLIPIDEMIA TYPE: ICD-10-CM

## 2021-08-09 DIAGNOSIS — I10 HTN (HYPERTENSION), BENIGN: ICD-10-CM

## 2021-08-09 DIAGNOSIS — Z79.4 TYPE 2 DIABETES MELLITUS WITHOUT COMPLICATION, WITH LONG-TERM CURRENT USE OF INSULIN: Primary | ICD-10-CM

## 2021-08-09 DIAGNOSIS — E11.9 TYPE 2 DIABETES MELLITUS WITHOUT COMPLICATION, WITH LONG-TERM CURRENT USE OF INSULIN: Primary | ICD-10-CM

## 2021-08-10 ENCOUNTER — PATIENT MESSAGE (OUTPATIENT)
Dept: INTERNAL MEDICINE | Facility: CLINIC | Age: 61
End: 2021-08-10

## 2021-08-19 LAB
BASOPHILS # BLD AUTO: 32 CELLS/UL (ref 0–200)
BASOPHILS NFR BLD AUTO: 0.7 %
CHOLEST SERPL-MCNC: 179 MG/DL
CHOLEST/HDLC SERPL: 5.4 (CALC)
EOSINOPHIL # BLD AUTO: 69 CELLS/UL (ref 15–500)
EOSINOPHIL NFR BLD AUTO: 1.5 %
ERYTHROCYTE [DISTWIDTH] IN BLOOD BY AUTOMATED COUNT: 12.9 % (ref 11–15)
HBA1C MFR BLD: 9.5 % OF TOTAL HGB
HCT VFR BLD AUTO: 49.4 % (ref 38.5–50)
HDLC SERPL-MCNC: 33 MG/DL
HGB BLD-MCNC: 16.2 G/DL (ref 13.2–17.1)
LDLC SERPL CALC-MCNC: ABNORMAL MG/DL (CALC)
LYMPHOCYTES # BLD AUTO: 1707 CELLS/UL (ref 850–3900)
LYMPHOCYTES NFR BLD AUTO: 37.1 %
MCH RBC QN AUTO: 30.3 PG (ref 27–33)
MCHC RBC AUTO-ENTMCNC: 32.8 G/DL (ref 32–36)
MCV RBC AUTO: 92.5 FL (ref 80–100)
MONOCYTES # BLD AUTO: 432 CELLS/UL (ref 200–950)
MONOCYTES NFR BLD AUTO: 9.4 %
NEUTROPHILS # BLD AUTO: 2360 CELLS/UL (ref 1500–7800)
NEUTROPHILS NFR BLD AUTO: 51.3 %
NONHDLC SERPL-MCNC: 146 MG/DL (CALC)
PLATELET # BLD AUTO: 184 THOUSAND/UL (ref 140–400)
PMV BLD REES-ECKER: 9.2 FL (ref 7.5–12.5)
RBC # BLD AUTO: 5.34 MILLION/UL (ref 4.2–5.8)
TRIGL SERPL-MCNC: 420 MG/DL
TSH SERPL-ACNC: 2.13 MIU/L (ref 0.4–4.5)
WBC # BLD AUTO: 4.6 THOUSAND/UL (ref 3.8–10.8)

## 2021-08-27 DIAGNOSIS — Z79.4 TYPE 2 DIABETES MELLITUS WITHOUT COMPLICATION, WITH LONG-TERM CURRENT USE OF INSULIN: ICD-10-CM

## 2021-08-27 DIAGNOSIS — E11.9 TYPE 2 DIABETES MELLITUS WITHOUT COMPLICATION, WITH LONG-TERM CURRENT USE OF INSULIN: ICD-10-CM

## 2021-08-27 RX ORDER — METFORMIN HYDROCHLORIDE 500 MG/1
1000 TABLET ORAL 2 TIMES DAILY WITH MEALS
Qty: 360 TABLET | Refills: 0 | OUTPATIENT
Start: 2021-08-27

## 2021-10-05 ENCOUNTER — PATIENT MESSAGE (OUTPATIENT)
Dept: ADMINISTRATIVE | Facility: HOSPITAL | Age: 61
End: 2021-10-05

## 2021-10-18 ENCOUNTER — PATIENT MESSAGE (OUTPATIENT)
Dept: ADMINISTRATIVE | Facility: HOSPITAL | Age: 61
End: 2021-10-18
Payer: COMMERCIAL

## 2022-02-02 ENCOUNTER — OFFICE VISIT (OUTPATIENT)
Dept: INTERNAL MEDICINE | Facility: CLINIC | Age: 62
End: 2022-02-02
Payer: COMMERCIAL

## 2022-02-02 ENCOUNTER — TELEPHONE (OUTPATIENT)
Dept: INTERNAL MEDICINE | Facility: CLINIC | Age: 62
End: 2022-02-02

## 2022-02-02 VITALS
SYSTOLIC BLOOD PRESSURE: 122 MMHG | DIASTOLIC BLOOD PRESSURE: 80 MMHG | HEART RATE: 73 BPM | WEIGHT: 302.5 LBS | BODY MASS INDEX: 43.4 KG/M2 | OXYGEN SATURATION: 96 % | TEMPERATURE: 98 F

## 2022-02-02 DIAGNOSIS — E11.65 TYPE 2 DIABETES MELLITUS WITH HYPERGLYCEMIA, WITHOUT LONG-TERM CURRENT USE OF INSULIN: ICD-10-CM

## 2022-02-02 DIAGNOSIS — Z11.4 SCREENING FOR HIV (HUMAN IMMUNODEFICIENCY VIRUS): ICD-10-CM

## 2022-02-02 DIAGNOSIS — E11.9 TYPE 2 DIABETES MELLITUS WITHOUT COMPLICATION, WITH LONG-TERM CURRENT USE OF INSULIN: ICD-10-CM

## 2022-02-02 DIAGNOSIS — E78.1 HYPERTRIGLYCERIDEMIA: ICD-10-CM

## 2022-02-02 DIAGNOSIS — Z00.00 ANNUAL PHYSICAL EXAM: Primary | ICD-10-CM

## 2022-02-02 DIAGNOSIS — E11.59 HYPERTENSION ASSOCIATED WITH DIABETES: ICD-10-CM

## 2022-02-02 DIAGNOSIS — Z12.11 SCREENING FOR MALIGNANT NEOPLASM OF COLON: ICD-10-CM

## 2022-02-02 DIAGNOSIS — E78.5 HYPERLIPIDEMIA DUE TO TYPE 2 DIABETES MELLITUS: ICD-10-CM

## 2022-02-02 DIAGNOSIS — N52.9 ERECTILE DYSFUNCTION, UNSPECIFIED ERECTILE DYSFUNCTION TYPE: ICD-10-CM

## 2022-02-02 DIAGNOSIS — Z12.5 SCREENING PSA (PROSTATE SPECIFIC ANTIGEN): ICD-10-CM

## 2022-02-02 DIAGNOSIS — I15.2 HYPERTENSION ASSOCIATED WITH DIABETES: ICD-10-CM

## 2022-02-02 DIAGNOSIS — E66.01 CLASS 3 SEVERE OBESITY DUE TO EXCESS CALORIES WITH SERIOUS COMORBIDITY AND BODY MASS INDEX (BMI) OF 40.0 TO 44.9 IN ADULT: ICD-10-CM

## 2022-02-02 DIAGNOSIS — Z79.4 TYPE 2 DIABETES MELLITUS WITHOUT COMPLICATION, WITH LONG-TERM CURRENT USE OF INSULIN: ICD-10-CM

## 2022-02-02 DIAGNOSIS — E11.69 HYPERLIPIDEMIA DUE TO TYPE 2 DIABETES MELLITUS: ICD-10-CM

## 2022-02-02 PROBLEM — E66.813 CLASS 3 SEVERE OBESITY DUE TO EXCESS CALORIES WITH SERIOUS COMORBIDITY AND BODY MASS INDEX (BMI) OF 40.0 TO 44.9 IN ADULT: Status: ACTIVE | Noted: 2022-02-02

## 2022-02-02 PROCEDURE — 99386 PREV VISIT NEW AGE 40-64: CPT | Mod: S$GLB,,, | Performed by: INTERNAL MEDICINE

## 2022-02-02 PROCEDURE — 99999 PR PBB SHADOW E&M-EST. PATIENT-LVL III: CPT | Mod: PBBFAC,,, | Performed by: INTERNAL MEDICINE

## 2022-02-02 PROCEDURE — 99999 PR PBB SHADOW E&M-EST. PATIENT-LVL III: ICD-10-PCS | Mod: PBBFAC,,, | Performed by: INTERNAL MEDICINE

## 2022-02-02 PROCEDURE — 99386 PR PREVENTIVE VISIT,NEW,40-64: ICD-10-PCS | Mod: S$GLB,,, | Performed by: INTERNAL MEDICINE

## 2022-02-02 RX ORDER — LISINOPRIL 20 MG/1
20 TABLET ORAL DAILY
Qty: 90 TABLET | Refills: 3 | Status: SHIPPED | OUTPATIENT
Start: 2022-02-02 | End: 2022-10-26 | Stop reason: SDUPTHER

## 2022-02-02 RX ORDER — ATORVASTATIN CALCIUM 20 MG/1
20 TABLET, FILM COATED ORAL DAILY
Qty: 90 TABLET | Refills: 3 | Status: SHIPPED | OUTPATIENT
Start: 2022-02-02 | End: 2022-10-26 | Stop reason: SDUPTHER

## 2022-02-02 RX ORDER — METFORMIN HYDROCHLORIDE 500 MG/1
1000 TABLET, EXTENDED RELEASE ORAL
Qty: 180 TABLET | Refills: 3 | Status: SHIPPED | OUTPATIENT
Start: 2022-02-02 | End: 2022-10-26 | Stop reason: SDUPTHER

## 2022-02-02 RX ORDER — TADALAFIL 20 MG/1
20 TABLET ORAL DAILY PRN
Qty: 30 TABLET | Refills: 5 | Status: SHIPPED | OUTPATIENT
Start: 2022-02-02 | End: 2023-06-21

## 2022-02-02 NOTE — PROGRESS NOTES
[facility] Note    Chief Complaint      Chief Complaint   Patient presents with    Establish Care     History of Present Illness      Lusí Torres is a 62 y.o. male who presents today to [facility] care.  Patient comes to appointment alone.    Problem List Items Addressed This Visit     Hypertension associated with diabetes    Current Assessment & Plan     BP controlled on lisinopril 20 mg daily, no CP/SOB/HA.         Relevant Medications    metFORMIN (GLUCOPHAGE-XR) 500 MG ER 24hr tablet    lisinopriL (PRINIVIL,ZESTRIL) 20 MG tablet    Hyperlipidemia due to type 2 diabetes mellitus    Current Assessment & Plan     Stable on lipitor 20 mg daily, no myalgias.  The 10-year ASCVD risk score (Catracho MENENDEZ Jr., et al., 2013) is: 23.6%    Values used to calculate the score:      Age: 62 years      Sex: Male      Is Non- : No      Diabetic: Yes      Tobacco smoker: No      Systolic Blood Pressure: 122 mmHg      Is BP treated: Yes      HDL Cholesterol: 33 mg/dL      Total Cholesterol: 179 mg/dL           Relevant Medications    metFORMIN (GLUCOPHAGE-XR) 500 MG ER 24hr tablet    atorvastatin (LIPITOR) 20 MG tablet    Type 2 diabetes mellitus with hyperglycemia, without long-term current use of insulin    Current Assessment & Plan     A1C 9.5 in 8/2021. Stable on metformin 1000 mg BID, no hypoglycemia.  Was having diarrhea with BID dosing.  Does well when he does better with diet.         Relevant Medications    metFORMIN (GLUCOPHAGE-XR) 500 MG ER 24hr tablet    Other Relevant Orders    Microalbumin/Creatinine Ratio, Urine    Class 3 severe obesity due to excess calories with serious comorbidity and body mass index (BMI) of 40.0 to 44.9 in adult    Current Assessment & Plan     Bowls 4 times per week but doesn't formally exercise.  Diet has not been good, has not been stress eating.         Erectile dysfunction    Current Assessment & Plan     Stable on cialis, works well.          Hypertriglyceridemia    Current Assessment & Plan     Up to 791.           Other Visit Diagnoses     Annual physical exam    -  Primary    Relevant Orders    CBC Auto Differential    Comprehensive Metabolic Panel    Lipid Panel    Hemoglobin A1C    Screening PSA (prostate specific antigen)        Relevant Orders    PSA, Screening    Screening for HIV (human immunodeficiency virus)        Relevant Orders    HIV 1/2 Ag/Ab (4th Gen)    Type 2 diabetes mellitus without complication, with long-term current use of insulin        Relevant Medications    metFORMIN (GLUCOPHAGE-XR) 500 MG ER 24hr tablet    Screening for malignant neoplasm of colon        Relevant Orders    Case Request Endoscopy: COLONOSCOPY (Completed)          Health Maintenance   Topic Date Due    PROSTATE-SPECIFIC ANTIGEN  10/03/2015    Eye Exam  03/09/2021    TETANUS VACCINE  02/02/2023 (Originally 1/28/1978)    Hemoglobin A1c  02/18/2022    Lipid Panel  08/18/2022    Low Dose Statin  02/02/2023    Foot Exam  02/02/2023    Hepatitis C Screening  Completed       History reviewed. No pertinent past medical history.    Past Surgical History:   Procedure Laterality Date    ADENOIDECTOMY  1965    EYE SURGERY  05/2020    Cataract removal R eye    TONSILLECTOMY  1965       family history includes Diabetes in his mother; Heart disease in his father and mother; Hypertension in his mother; Vision loss in his mother.    Social History     Tobacco Use    Smoking status: Never Smoker    Smokeless tobacco: Never Used   Substance Use Topics    Alcohol use: Yes     Alcohol/week: 7.0 standard drinks     Types: 4 Cans of beer, 3 Shots of liquor per week    Drug use: No       Review of Systems   Constitutional: Negative for chills and fever.   HENT: Negative for sore throat.    Respiratory: Negative for cough and shortness of breath.    Cardiovascular: Negative for chest pain and palpitations.   Gastrointestinal: Negative for constipation, diarrhea, nausea  and vomiting.   Genitourinary: Negative for dysuria and hematuria.   Musculoskeletal: Negative for falls.   Neurological: Negative for headaches.        Outpatient Encounter Medications as of 2/2/2022   Medication Sig Note Dispense Refill    [DISCONTINUED] atorvastatin (LIPITOR) 20 MG tablet Take 1 tablet (20 mg total) by mouth once daily.  90 tablet 3    [DISCONTINUED] lisinopriL (PRINIVIL,ZESTRIL) 20 MG tablet Take 1 tablet (20 mg total) by mouth once daily.  90 tablet 3    [DISCONTINUED] metFORMIN (GLUCOPHAGE) 500 MG tablet TAKE 2 TABLETS(1000 MG) BY MOUTH TWICE DAILY WITH MEALS  360 tablet 0    [DISCONTINUED] tadalafiL (CIALIS) 20 MG Tab Take 1 tablet (20 mg total) by mouth daily as needed.  5 tablet 0    atorvastatin (LIPITOR) 20 MG tablet Take 1 tablet (20 mg total) by mouth once daily.  90 tablet 3    lisinopriL (PRINIVIL,ZESTRIL) 20 MG tablet Take 1 tablet (20 mg total) by mouth once daily.  90 tablet 3    metFORMIN (GLUCOPHAGE-XR) 500 MG ER 24hr tablet Take 2 tablets (1,000 mg total) by mouth daily with breakfast.  180 tablet 3    tadalafiL (CIALIS) 20 MG Tab Take 1 tablet (20 mg total) by mouth daily as needed.  30 tablet 5    [DISCONTINUED] blood sugar diagnostic (ONETOUCH VERIO) Strp TEST ONCE DAILY (Patient not taking: Reported on 2/2/2022)  50 strip 0    [DISCONTINUED] clotrimazole-betamethasone 1-0.05% (LOTRISONE) cream APPLY TOPICALLY TWICE DAILY (Patient not taking: Reported on 2/2/2022)  45 g 0    [DISCONTINUED] fish oil-omega-3 fatty acids 300-1,000 mg capsule Take by mouth once daily.       [DISCONTINUED] lancets Misc Please dispense lancets for One Touch Verio. (Patient not taking: Reported on 2/2/2022)  100 each 0    [DISCONTINUED] multivitamin (ONE DAILY MULTIVITAMIN) per tablet Take 1 tablet by mouth once daily.       [DISCONTINUED] ONETOUCH DELICA LANCETS 30 gauge Misc  10/9/2015: Received from: External Pharmacy  11    [DISCONTINUED] potassium chloride 40 mEq/15 mL Liqd Take  20 mEq by mouth once.        No facility-administered encounter medications on file as of 2/2/2022.        Review of patient's allergies indicates:   Allergen Reactions    No known drug allergies        Physical Exam      Vital Signs  Temp: 98.3 °F (36.8 °C)  Pulse: 73  SpO2: 96 %  BP: 122/80  BP Location: Left arm  Patient Position: Sitting  Pain Score: 0-No pain  Height and Weight  Weight: (!) 137.2 kg (302 lb 7.5 oz)]  Body mass index is 43.4 kg/m².    Physical Exam  Constitutional:       Appearance: He is well-developed.   HENT:      Head: Normocephalic and atraumatic.   Neck:      Thyroid: No thyromegaly.   Cardiovascular:      Rate and Rhythm: Normal rate and regular rhythm.      Pulses:           Dorsalis pedis pulses are 2+ on the right side and 2+ on the left side.      Heart sounds: No murmur heard.      Pulmonary:      Effort: Pulmonary effort is normal. No respiratory distress.      Breath sounds: Normal breath sounds.   Abdominal:      General: There is no distension.      Palpations: Abdomen is soft.      Tenderness: There is no abdominal tenderness.   Musculoskeletal:      Right foot: Normal range of motion. No deformity.      Left foot: Normal range of motion. No deformity.   Feet:      Right foot:      Protective Sensation: 5 sites tested. 5 sites sensed.      Skin integrity: No ulcer or blister.      Left foot:      Protective Sensation: 5 sites tested. 5 sites sensed.      Skin integrity: No ulcer or blister.   Skin:     General: Skin is warm and dry.   Neurological:      Mental Status: He is alert and oriented to person, place, and time.   Psychiatric:         Behavior: Behavior normal.          Laboratory:  CBC:  No results for input(s): WBC, RBC, HGB, HCT, PLT, MCV, MCH, MCHC in the last 2160 hours.  CMP:  No results for input(s): GLU, CALCIUM, ALBUMIN, PROT, NA, K, CO2, CL, BUN, ALKPHOS, ALT, AST, BILITOT in the last 2160 hours.    Invalid input(s): CREATININ  URINALYSIS:  No results for  input(s): COLORU, CLARITYU, SPECGRAV, PHUR, PROTEINUA, GLUCOSEU, BILIRUBINCON, BLOODU, WBCU, RBCU, BACTERIA, MUCUS, NITRITE, LEUKOCYTESUR, UROBILINOGEN, HYALINECASTS in the last 2160 hours.   LIPIDS:  No results for input(s): TSH, HDL, CHOL, TRIG, LDLCALC, CHOLHDL, NONHDLCHOL, TOTALCHOLEST in the last 2160 hours.  TSH:  No results for input(s): TSH in the last 2160 hours.  A1C:  No results for input(s): HGBA1C in the last 2160 hours.    Radiology:  No results found in the last 30 days.     Assessment/Plan     Luís Torres is a 62 y.o.male with:    1. Annual physical exam  - CBC Auto Differential; Future  - Comprehensive Metabolic Panel; Future  - Lipid Panel; Future  - Hemoglobin A1C; Future    2. Type 2 diabetes mellitus with hyperglycemia, without long-term current use of insulin  - Microalbumin/Creatinine Ratio, Urine; Future  - metFORMIN (GLUCOPHAGE-XR) 500 MG ER 24hr tablet; Take 2 tablets (1,000 mg total) by mouth daily with breakfast.  Dispense: 180 tablet; Refill: 3    3. Hypertension associated with diabetes  - lisinopriL (PRINIVIL,ZESTRIL) 20 MG tablet; Take 1 tablet (20 mg total) by mouth once daily.  Dispense: 90 tablet; Refill: 3    4. Hyperlipidemia due to type 2 diabetes mellitus  - atorvastatin (LIPITOR) 20 MG tablet; Take 1 tablet (20 mg total) by mouth once daily.  Dispense: 90 tablet; Refill: 3    5. Class 3 severe obesity due to excess calories with serious comorbidity and body mass index (BMI) of 40.0 to 44.9 in adult    6. Hypertriglyceridemia    7. Screening PSA (prostate specific antigen)  - PSA, Screening; Future    8. Erectile dysfunction, unspecified erectile dysfunction type    9. Screening for HIV (human immunodeficiency virus)  - HIV 1/2 Ag/Ab (4th Gen); Future    10. Type 2 diabetes mellitus without complication, with long-term current use of insulin    11. Screening for malignant neoplasm of colon  - Case Request Endoscopy: COLONOSCOPY    -get eye exam from [facility]  -[facility]  metformin to ER 1000 mg, consider Ozempic/Trulicity pending results  -refer to Dr. Nichols for colonoscopy  -Continue current medications and maintain follow up with specialists.    -Follow up in about 6 months (around 8/2/2022) for follow up of medical problems.       Kirti Phelan MD  [facility]

## 2022-02-02 NOTE — PATIENT INSTRUCTIONS
Call [facility] to schedule eye exam.    Get your Cialis filled at [facility], [facility] or [facility].

## 2022-02-02 NOTE — ASSESSMENT & PLAN NOTE
A1C 9.5 in 8/2021. Stable on metformin 1000 mg BID, no hypoglycemia.  Was having diarrhea with BID dosing.  Does well when he does better with diet.

## 2022-02-09 ENCOUNTER — PATIENT MESSAGE (OUTPATIENT)
Dept: INTERNAL MEDICINE | Facility: CLINIC | Age: 62
End: 2022-02-09
Payer: COMMERCIAL

## 2022-02-09 DIAGNOSIS — E11.65 TYPE 2 DIABETES MELLITUS WITH HYPERGLYCEMIA, WITHOUT LONG-TERM CURRENT USE OF INSULIN: Primary | ICD-10-CM

## 2022-02-10 ENCOUNTER — PATIENT MESSAGE (OUTPATIENT)
Dept: INTERNAL MEDICINE | Facility: CLINIC | Age: 62
End: 2022-02-10
Payer: COMMERCIAL

## 2022-02-18 ENCOUNTER — TELEPHONE (OUTPATIENT)
Dept: GASTROENTEROLOGY | Facility: CLINIC | Age: 62
End: 2022-02-18
Payer: COMMERCIAL

## 2022-02-22 ENCOUNTER — PATIENT MESSAGE (OUTPATIENT)
Dept: ADMINISTRATIVE | Facility: HOSPITAL | Age: 62
End: 2022-02-22
Payer: COMMERCIAL

## 2022-02-22 ENCOUNTER — PATIENT MESSAGE (OUTPATIENT)
Dept: INTERNAL MEDICINE | Facility: CLINIC | Age: 62
End: 2022-02-22
Payer: COMMERCIAL

## 2022-02-22 RX ORDER — LANCETS 33 GAUGE
1 EACH MISCELLANEOUS 2 TIMES DAILY
Qty: 200 EACH | Refills: 1 | Status: SHIPPED | OUTPATIENT
Start: 2022-02-22

## 2022-02-22 RX ORDER — LANCETS 33 GAUGE
1 EACH MISCELLANEOUS 2 TIMES DAILY
COMMUNITY
End: 2022-02-22 | Stop reason: SDUPTHER

## 2022-02-22 NOTE — TELEPHONE ENCOUNTER
Care Due:                  Date            Visit Type   Department     Provider  --------------------------------------------------------------------------------                                NP -                              PRIMARY      [facility] PRIMARY  Last Visit: 02-      CARE ([facility])   CARE           Kirti Phelan  Next Visit: None Scheduled  None         None Found                                                            Last  Test          Frequency    Reason                     Performed    Due Date  --------------------------------------------------------------------------------    CMP.........  12 months..  atorvastatin, lisinopriL,   01-   01-                             metFORMIN................    HBA1C.......  6 months...  metFORMIN................  08-   02-    Powered by [employer]. Reference number: [id number].   2/22/2022 11:36:21 AM CST

## 2022-04-11 ENCOUNTER — TELEPHONE (OUTPATIENT)
Dept: INTERNAL MEDICINE | Facility: CLINIC | Age: 62
End: 2022-04-11
Payer: COMMERCIAL

## 2022-04-11 ENCOUNTER — PATIENT MESSAGE (OUTPATIENT)
Dept: INTERNAL MEDICINE | Facility: CLINIC | Age: 62
End: 2022-04-11
Payer: COMMERCIAL

## 2022-04-11 DIAGNOSIS — E11.65 TYPE 2 DIABETES MELLITUS WITH HYPERGLYCEMIA, WITHOUT LONG-TERM CURRENT USE OF INSULIN: Primary | ICD-10-CM

## 2022-04-11 NOTE — TELEPHONE ENCOUNTER
----- Message from Alicia Eller sent at 4/11/2022 11:36 AM CDT -----  Patient is asking for lab orders to be sent to [facility] to have his A1C checked

## 2022-04-16 LAB
ALBUMIN SERPL-MCNC: 4.5 G/DL (ref 3.6–5.1)
ALBUMIN/GLOB SERPL: 2.1 (CALC) (ref 1–2.5)
ALP SERPL-CCNC: 38 U/L (ref 35–144)
ALT SERPL-CCNC: 13 U/L (ref 9–46)
AST SERPL-CCNC: 14 U/L (ref 10–35)
BILIRUB SERPL-MCNC: 1.4 MG/DL (ref 0.2–1.2)
BUN SERPL-MCNC: 20 MG/DL (ref 7–25)
BUN/CREAT SERPL: ABNORMAL (CALC) (ref 6–22)
CALCIUM SERPL-MCNC: 9.7 MG/DL (ref 8.6–10.3)
CHLORIDE SERPL-SCNC: 103 MMOL/L (ref 98–110)
CHOLEST SERPL-MCNC: 107 MG/DL
CHOLEST/HDLC SERPL: 3.5 (CALC)
CO2 SERPL-SCNC: 29 MMOL/L (ref 20–32)
CREAT SERPL-MCNC: 0.83 MG/DL (ref 0.7–1.25)
GLOBULIN SER CALC-MCNC: 2.1 G/DL (CALC) (ref 1.9–3.7)
GLUCOSE SERPL-MCNC: 140 MG/DL (ref 65–99)
HBA1C MFR BLD: 7.1 % OF TOTAL HGB
HDLC SERPL-MCNC: 31 MG/DL
LDLC SERPL CALC-MCNC: 53 MG/DL (CALC)
NONHDLC SERPL-MCNC: 76 MG/DL (CALC)
POTASSIUM SERPL-SCNC: 4.1 MMOL/L (ref 3.5–5.3)
PROT SERPL-MCNC: 6.6 G/DL (ref 6.1–8.1)
SODIUM SERPL-SCNC: 140 MMOL/L (ref 135–146)
TRIGL SERPL-MCNC: 155 MG/DL

## 2022-04-20 ENCOUNTER — OFFICE VISIT (OUTPATIENT)
Dept: INTERNAL MEDICINE | Facility: CLINIC | Age: 62
End: 2022-04-20
Payer: COMMERCIAL

## 2022-04-20 DIAGNOSIS — E11.59 HYPERTENSION ASSOCIATED WITH DIABETES: ICD-10-CM

## 2022-04-20 DIAGNOSIS — I15.2 HYPERTENSION ASSOCIATED WITH DIABETES: ICD-10-CM

## 2022-04-20 DIAGNOSIS — Z12.5 SCREENING PSA (PROSTATE SPECIFIC ANTIGEN): Primary | ICD-10-CM

## 2022-04-20 DIAGNOSIS — E78.1 HYPERTRIGLYCERIDEMIA: ICD-10-CM

## 2022-04-20 DIAGNOSIS — E11.9 TYPE 2 DIABETES MELLITUS WITHOUT COMPLICATION, UNSPECIFIED WHETHER LONG TERM INSULIN USE: ICD-10-CM

## 2022-04-20 DIAGNOSIS — E11.69 HYPERLIPIDEMIA DUE TO TYPE 2 DIABETES MELLITUS: ICD-10-CM

## 2022-04-20 DIAGNOSIS — E78.5 HYPERLIPIDEMIA DUE TO TYPE 2 DIABETES MELLITUS: ICD-10-CM

## 2022-04-20 DIAGNOSIS — E11.65 TYPE 2 DIABETES MELLITUS WITH HYPERGLYCEMIA, WITHOUT LONG-TERM CURRENT USE OF INSULIN: ICD-10-CM

## 2022-04-20 DIAGNOSIS — Z00.00 ANNUAL PHYSICAL EXAM: ICD-10-CM

## 2022-04-20 PROCEDURE — 99214 PR OFFICE/OUTPT VISIT, EST, LEVL IV, 30-39 MIN: ICD-10-PCS | Mod: S$GLB,,, | Performed by: INTERNAL MEDICINE

## 2022-04-20 PROCEDURE — 99214 OFFICE O/P EST MOD 30 MIN: CPT | Mod: S$GLB,,, | Performed by: INTERNAL MEDICINE

## 2022-04-20 PROCEDURE — 99999 PR PBB SHADOW E&M-EST. PATIENT-LVL III: CPT | Mod: PBBFAC,,, | Performed by: INTERNAL MEDICINE

## 2022-04-20 PROCEDURE — 99999 PR PBB SHADOW E&M-EST. PATIENT-LVL III: ICD-10-PCS | Mod: PBBFAC,,, | Performed by: INTERNAL MEDICINE

## 2022-04-20 NOTE — ASSESSMENT & PLAN NOTE
A1C 9.5 in 8/2021, improved to 7.1 on 4/2022 labs. Stable on metformin 1000 mg ER, no hypoglycemia.  Had diarrhea with instant release dosing.  Does well when he does better with diet.

## 2022-04-20 NOTE — ASSESSMENT & PLAN NOTE
Stable on lipitor 20 mg daily, no myalgias.  The ASCVD Risk score (Truckeechris MENENDEZ Jr., et al., 2013) failed to calculate for the following reasons:    The valid total cholesterol range is 130 to 320 mg/dL

## 2022-04-20 NOTE — PROGRESS NOTES
[facility] Note    Chief Complaint      Chief Complaint   Patient presents with    Follow-up     History of Present Illness      Luís Torres is a 62 y.o. male who presents today for diabetes follow up.  Patient comes to appointment alone.    Problem List Items Addressed This Visit     Hypertension associated with diabetes    Current Assessment & Plan     BP controlled on lisinopril 20 mg daily, no CP/SOB/HA.  Doesn't add salt.           Hyperlipidemia due to type 2 diabetes mellitus    Current Assessment & Plan     Stable on lipitor 20 mg daily, no myalgias.  The ASCVD Risk score ([place] SHON Jr., et al., 2013) failed to calculate for the following reasons:    The valid total cholesterol range is 130 to 320 mg/dL             Type 2 diabetes mellitus with hyperglycemia, without long-term current use of insulin    Current Assessment & Plan     A1C 9.5 in 8/2021, improved to 7.1 on 4/2022 labs. Stable on metformin 1000 mg ER, no hypoglycemia.  Had diarrhea with instant release dosing.  Does well when he does better with diet.           Relevant Orders    Microalbumin/Creatinine Ratio, Urine    Hemoglobin A1C    Hypertriglyceridemia    Current Assessment & Plan     Up to 791 in 1/2021, much improved at 155 on 4/2022 labs.             Other Visit Diagnoses     Screening PSA (prostate specific antigen)    -  Primary    Relevant Orders    PSA, Screening    Annual physical exam        Relevant Orders    CBC Auto Differential    Comprehensive Metabolic Panel    Hemoglobin A1C          Health Maintenance   Topic Date Due    PROSTATE-SPECIFIC ANTIGEN  10/03/2015    Eye Exam  03/09/2021    TETANUS VACCINE  02/02/2023 (Originally 1/28/1978)    Hemoglobin A1c  10/15/2022    Low Dose Statin  02/02/2023    Lipid Panel  04/15/2023    Foot Exam  04/20/2023    Hepatitis C Screening  Completed       History reviewed. No pertinent past medical history.    Past Surgical History:   Procedure Laterality Date     ADENOIDECTOMY  1965    EYE SURGERY  05/2020    Cataract removal R eye    TONSILLECTOMY  1965       family history includes Diabetes in his mother; Heart disease in his father and mother; Hypertension in his mother; Vision loss in his mother.    Social History     Tobacco Use    Smoking status: Never Smoker    Smokeless tobacco: Never Used   Substance Use Topics    Alcohol use: Yes     Alcohol/week: 7.0 standard drinks     Types: 4 Cans of beer, 3 Shots of liquor per week    Drug use: No       Review of Systems   Constitutional: Negative for chills and fever.   HENT: Negative for sore throat.    Respiratory: Negative for cough and shortness of breath.    Cardiovascular: Negative for chest pain and palpitations.   Gastrointestinal: Negative for constipation, diarrhea, nausea and vomiting.   Genitourinary: Negative for dysuria and hematuria.   Musculoskeletal: Negative for falls.   Neurological: Negative for headaches.        Outpatient Encounter Medications as of 4/20/2022   Medication Sig Dispense Refill    atorvastatin (LIPITOR) 20 MG tablet Take 1 tablet (20 mg total) by mouth once daily. 90 tablet 3    blood sugar diagnostic (ONETOUCH VERIO TEST STRIPS) Strp 1 strip by Misc.(Non-Drug; Combo Route) route 2 (two) times a day. 200 each 1    lancets 33 gauge Misc 1 lancet by Misc.(Non-Drug; Combo Route) route 2 (two) times a day. 200 each 1    lisinopriL (PRINIVIL,ZESTRIL) 20 MG tablet Take 1 tablet (20 mg total) by mouth once daily. 90 tablet 3    metFORMIN (GLUCOPHAGE-XR) 500 MG ER 24hr tablet Take 2 tablets (1,000 mg total) by mouth daily with breakfast. 180 tablet 3    tadalafiL (CIALIS) 20 MG Tab Take 1 tablet (20 mg total) by mouth daily as needed. 30 tablet 5     No facility-administered encounter medications on file as of 4/20/2022.        Review of patient's allergies indicates:   Allergen Reactions    No known drug allergies        Physical Exam      Vital Signs  Temp: (P) 98.4 °F (36.9  °C)  Pulse: (P) 66  SpO2: (P) 97 %  BP: (P) 130/80  Pain Score: (P) 0-No pain  Height and Weight  Weight: (P) 134.7 kg (296 lb 15.4 oz)]  Body mass index is 42.61 kg/m² (pended).    Physical Exam  Constitutional:       Appearance: He is well-developed.   HENT:      Head: Normocephalic and atraumatic.   Neck:      Thyroid: No thyromegaly.   Cardiovascular:      Rate and Rhythm: Normal rate and regular rhythm.      Heart sounds: No murmur heard.  Pulmonary:      Effort: Pulmonary effort is normal. No respiratory distress.      Breath sounds: Normal breath sounds.   Abdominal:      General: There is no distension.      Palpations: Abdomen is soft.      Tenderness: There is no abdominal tenderness.   Skin:     General: Skin is warm and dry.   Neurological:      Mental Status: He is alert and oriented to person, place, and time.   Psychiatric:         Behavior: Behavior normal.          Laboratory:  CBC:  No results for input(s): WBC, RBC, HGB, HCT, PLT, MCV, MCH, MCHC in the last 2160 hours.  CMP:  Recent Labs   Lab Result Units 04/15/22  0659   Glucose mg/dL 140*   Calcium mg/dL 9.7   Albumin g/dL 4.5   Total Protein g/dL 6.6   Sodium mmol/L 140   Potassium mmol/L 4.1   CO2 mmol/L 29   Chloride mmol/L 103   BUN mg/dL 20   ALT U/L 13   AST U/L 14   Total Bilirubin mg/dL 1.4*     URINALYSIS:  No results for input(s): COLORU, CLARITYU, SPECGRAV, PHUR, PROTEINUA, GLUCOSEU, BILIRUBINCON, BLOODU, WBCU, RBCU, BACTERIA, MUCUS, NITRITE, LEUKOCYTESUR, UROBILINOGEN, HYALINECASTS in the last 2160 hours.   LIPIDS:  Recent Labs   Lab Result Units 04/15/22  0659   HDL mg/dL 31*   Cholesterol mg/dL 107   Triglycerides mg/dL 155*   LDL Cholesterol mg/dL (calc) 53   HDL/Cholesterol Ratio (calc) 3.5   Non HDL Chol. (LDL+VLDL) mg/dL (calc) 76     TSH:  No results for input(s): TSH in the last 2160 hours.  A1C:  Recent Labs   Lab Result Units 04/15/22  0659   Hemoglobin A1C % of total Hgb 7.1*       Radiology:  No results found in the last  30 days.     Assessment/Plan     Luís Torres is a 62 y.o.male with:    1. Screening PSA (prostate specific antigen)  - PSA, Screening; Future  - PSA, Screening    2. Type 2 diabetes mellitus with hyperglycemia, without long-term current use of insulin  - Microalbumin/Creatinine Ratio, Urine; Future  - Hemoglobin A1C; Future  - Microalbumin/Creatinine Ratio, Urine  - Hemoglobin A1C    3. Annual physical exam  - CBC Auto Differential; Future  - Comprehensive Metabolic Panel; Future  - Hemoglobin A1C; Future  - CBC Auto Differential  - Comprehensive Metabolic Panel  - Hemoglobin A1C    4. Hypertension associated with diabetes    5. Hyperlipidemia due to type 2 diabetes mellitus    6. Hypertriglyceridemia       -get eye exam from [facility]  -referred to Dr. Nichols for colonoscopy in [place]  -Continue current medications and maintain follow up with specialists.    -Follow up in about 6 months (around 10/20/2022) for follow up of medical problems.       Kirti Phelan MD  [facility]

## 2022-05-05 ENCOUNTER — PATIENT OUTREACH (OUTPATIENT)
Dept: ADMINISTRATIVE | Facility: HOSPITAL | Age: 62
End: 2022-05-05
Payer: COMMERCIAL

## 2022-05-05 NOTE — PROGRESS NOTES
Health Maintenance Due   Topic Date Due    Eye Exam  03/09/2021    Colorectal Cancer Screening  09/01/2021    COVID-19 Vaccine (4 - Booster for Pfizer series) 03/05/2022     Triggered LINKS & reconciled immunizations. Updated Care Everywhere. Imported outside lab results into . Chart review completed.

## 2022-05-09 ENCOUNTER — PATIENT OUTREACH (OUTPATIENT)
Dept: ADMINISTRATIVE | Facility: HOSPITAL | Age: 62
End: 2022-05-09
Payer: COMMERCIAL

## 2022-05-09 NOTE — PROGRESS NOTES
Health Maintenance Due   Topic Date Due    Colorectal Cancer Screening  09/01/2021    COVID-19 Vaccine (4 - Booster for Pfizer series) 03/05/2022     Triggered LINKS. Updated Care Everywhere. Imported outside diabetic eye exam results into . Chart review completed.

## 2022-09-12 ENCOUNTER — PATIENT MESSAGE (OUTPATIENT)
Dept: INTERNAL MEDICINE | Facility: CLINIC | Age: 62
End: 2022-09-12
Payer: COMMERCIAL

## 2022-09-21 ENCOUNTER — PATIENT MESSAGE (OUTPATIENT)
Dept: INTERNAL MEDICINE | Facility: CLINIC | Age: 62
End: 2022-09-21
Payer: COMMERCIAL

## 2022-10-20 LAB
ALBUMIN SERPL-MCNC: 4.3 G/DL (ref 3.6–5.1)
ALBUMIN/CREAT UR: 17 MCG/MG CREAT
ALBUMIN/GLOB SERPL: 1.9 (CALC) (ref 1–2.5)
ALP SERPL-CCNC: 36 U/L (ref 35–144)
ALT SERPL-CCNC: 19 U/L (ref 9–46)
AST SERPL-CCNC: 26 U/L (ref 10–35)
BASOPHILS # BLD AUTO: 32 CELLS/UL (ref 0–200)
BASOPHILS NFR BLD AUTO: 0.6 %
BILIRUB SERPL-MCNC: 1.2 MG/DL (ref 0.2–1.2)
BUN SERPL-MCNC: 16 MG/DL (ref 7–25)
BUN/CREAT SERPL: ABNORMAL (CALC) (ref 6–22)
CALCIUM SERPL-MCNC: 9.3 MG/DL (ref 8.6–10.3)
CHLORIDE SERPL-SCNC: 103 MMOL/L (ref 98–110)
CO2 SERPL-SCNC: 27 MMOL/L (ref 20–32)
CREAT SERPL-MCNC: 0.76 MG/DL (ref 0.7–1.35)
CREAT UR-MCNC: 102 MG/DL (ref 20–320)
EGFR: 102 ML/MIN/1.73M2
EOSINOPHIL # BLD AUTO: 101 CELLS/UL (ref 15–500)
EOSINOPHIL NFR BLD AUTO: 1.9 %
ERYTHROCYTE [DISTWIDTH] IN BLOOD BY AUTOMATED COUNT: 12.4 % (ref 11–15)
GLOBULIN SER CALC-MCNC: 2.3 G/DL (CALC) (ref 1.9–3.7)
GLUCOSE SERPL-MCNC: 129 MG/DL (ref 65–99)
HBA1C MFR BLD: 7.9 % OF TOTAL HGB
HCT VFR BLD AUTO: 43.3 % (ref 38.5–50)
HGB BLD-MCNC: 14.7 G/DL (ref 13.2–17.1)
LYMPHOCYTES # BLD AUTO: 1664 CELLS/UL (ref 850–3900)
LYMPHOCYTES NFR BLD AUTO: 31.4 %
MCH RBC QN AUTO: 31.1 PG (ref 27–33)
MCHC RBC AUTO-ENTMCNC: 33.9 G/DL (ref 32–36)
MCV RBC AUTO: 91.7 FL (ref 80–100)
MICROALBUMIN UR-MCNC: 1.7 MG/DL
MONOCYTES # BLD AUTO: 456 CELLS/UL (ref 200–950)
MONOCYTES NFR BLD AUTO: 8.6 %
NEUTROPHILS # BLD AUTO: 3048 CELLS/UL (ref 1500–7800)
NEUTROPHILS NFR BLD AUTO: 57.5 %
PLATELET # BLD AUTO: 161 THOUSAND/UL (ref 140–400)
PMV BLD REES-ECKER: 9.7 FL (ref 7.5–12.5)
POTASSIUM SERPL-SCNC: 4.4 MMOL/L (ref 3.5–5.3)
PROT SERPL-MCNC: 6.6 G/DL (ref 6.1–8.1)
PSA SERPL-MCNC: 2.47 NG/ML
RBC # BLD AUTO: 4.72 MILLION/UL (ref 4.2–5.8)
SODIUM SERPL-SCNC: 138 MMOL/L (ref 135–146)
WBC # BLD AUTO: 5.3 THOUSAND/UL (ref 3.8–10.8)

## 2022-10-26 ENCOUNTER — OFFICE VISIT (OUTPATIENT)
Dept: INTERNAL MEDICINE | Facility: CLINIC | Age: 62
End: 2022-10-26
Payer: COMMERCIAL

## 2022-10-26 VITALS
WEIGHT: 295 LBS | BODY MASS INDEX: 42.23 KG/M2 | DIASTOLIC BLOOD PRESSURE: 80 MMHG | TEMPERATURE: 98 F | SYSTOLIC BLOOD PRESSURE: 130 MMHG | OXYGEN SATURATION: 95 % | HEART RATE: 60 BPM | HEIGHT: 70 IN

## 2022-10-26 DIAGNOSIS — E66.01 CLASS 3 SEVERE OBESITY DUE TO EXCESS CALORIES WITH SERIOUS COMORBIDITY AND BODY MASS INDEX (BMI) OF 40.0 TO 44.9 IN ADULT: ICD-10-CM

## 2022-10-26 DIAGNOSIS — E78.5 HYPERLIPIDEMIA DUE TO TYPE 2 DIABETES MELLITUS: ICD-10-CM

## 2022-10-26 DIAGNOSIS — E11.65 TYPE 2 DIABETES MELLITUS WITH HYPERGLYCEMIA, WITHOUT LONG-TERM CURRENT USE OF INSULIN: ICD-10-CM

## 2022-10-26 DIAGNOSIS — E11.59 HYPERTENSION ASSOCIATED WITH DIABETES: ICD-10-CM

## 2022-10-26 DIAGNOSIS — G89.29 CHRONIC PAIN OF RIGHT KNEE: ICD-10-CM

## 2022-10-26 DIAGNOSIS — I15.2 HYPERTENSION ASSOCIATED WITH DIABETES: ICD-10-CM

## 2022-10-26 DIAGNOSIS — M25.561 CHRONIC PAIN OF RIGHT KNEE: ICD-10-CM

## 2022-10-26 DIAGNOSIS — E11.69 HYPERLIPIDEMIA DUE TO TYPE 2 DIABETES MELLITUS: ICD-10-CM

## 2022-10-26 PROCEDURE — 99999 PR PBB SHADOW E&M-EST. PATIENT-LVL III: CPT | Mod: PBBFAC,,, | Performed by: INTERNAL MEDICINE

## 2022-10-26 PROCEDURE — 99214 OFFICE O/P EST MOD 30 MIN: CPT | Mod: S$GLB,,, | Performed by: INTERNAL MEDICINE

## 2022-10-26 PROCEDURE — 99214 PR OFFICE/OUTPT VISIT, EST, LEVL IV, 30-39 MIN: ICD-10-PCS | Mod: S$GLB,,, | Performed by: INTERNAL MEDICINE

## 2022-10-26 PROCEDURE — 99999 PR PBB SHADOW E&M-EST. PATIENT-LVL III: ICD-10-PCS | Mod: PBBFAC,,, | Performed by: INTERNAL MEDICINE

## 2022-10-26 RX ORDER — ATORVASTATIN CALCIUM 20 MG/1
20 TABLET, FILM COATED ORAL DAILY
Qty: 90 TABLET | Refills: 3 | Status: SHIPPED | OUTPATIENT
Start: 2022-10-26 | End: 2023-08-18 | Stop reason: SDUPTHER

## 2022-10-26 RX ORDER — METFORMIN HYDROCHLORIDE 500 MG/1
1000 TABLET, EXTENDED RELEASE ORAL
Qty: 180 TABLET | Refills: 3 | Status: SHIPPED | OUTPATIENT
Start: 2022-10-26 | End: 2023-08-18 | Stop reason: SDUPTHER

## 2022-10-26 RX ORDER — LISINOPRIL 20 MG/1
20 TABLET ORAL DAILY
Qty: 90 TABLET | Refills: 3 | Status: SHIPPED | OUTPATIENT
Start: 2022-10-26 | End: 2023-08-18 | Stop reason: SDUPTHER

## 2022-10-26 NOTE — ASSESSMENT & PLAN NOTE
Stable on lipitor 20 mg daily, no myalgias.  The ASCVD Risk score (Jonas SHEEHAN, et al., 2019) failed to calculate for the following reasons:    The valid total cholesterol range is 130 to 320 mg/dL

## 2022-10-26 NOTE — ASSESSMENT & PLAN NOTE
A1C 9.5 in 8/2021, improved to 7.9 on 10/2022 labs. Stable on metformin 1000 mg ER, no hypoglycemia.  Had diarrhea with instant release dosing.  Went on cruise recently and had steroid injection in knee.

## 2022-10-26 NOTE — PROGRESS NOTES
[facility] Note    Chief Complaint      Chief Complaint   Patient presents with    Follow-up     History of Present Illness      Luís Torres is a 62 y.o. male who presents today for diabetes follow up.  Patient comes to appointment alone.  Ortho: [facility]    Problem List Items Addressed This Visit       Hypertension associated with diabetes    Current Assessment & Plan     BP controlled on lisinopril 20 mg daily, no CP/SOB/HA.  Doesn't add salt.         Relevant Medications    lisinopriL (PRINIVIL,ZESTRIL) 20 MG tablet    metFORMIN (GLUCOPHAGE-XR) 500 MG ER 24hr tablet    Hyperlipidemia due to type 2 diabetes mellitus    Current Assessment & Plan     Stable on lipitor 20 mg daily, no myalgias.  The ASCVD Risk score (Jonas SHEEHAN, et al., 2019) failed to calculate for the following reasons:    The valid total cholesterol range is 130 to 320 mg/dL           Relevant Medications    atorvastatin (LIPITOR) 20 MG tablet    metFORMIN (GLUCOPHAGE-XR) 500 MG ER 24hr tablet    Type 2 diabetes mellitus with hyperglycemia, without long-term current use of insulin    Current Assessment & Plan     A1C 9.5 in 8/2021, improved to 7.9 on 10/2022 labs. Stable on metformin 1000 mg ER, no hypoglycemia.  Had diarrhea with instant release dosing.  Went on cruise recently and had steroid injection in knee.         Relevant Medications    metFORMIN (GLUCOPHAGE-XR) 500 MG ER 24hr tablet    Class 3 severe obesity due to excess calories with serious comorbidity and body mass index (BMI) of 40.0 to 44.9 in adult    Current Assessment & Plan     Bowls 4 times per week but doesn't formally exercise.  Diet has not been good, has not been stress eating.         Chronic pain of right knee    Current Assessment & Plan     Sees Dr. Earl, s/p injection recently but did not help, thinking he will need knee replacement.            Health Maintenance   Topic Date Due    TETANUS VACCINE  02/02/2023 (Originally 1/28/1978)    Eye Exam   03/25/2023    Lipid Panel  04/15/2023    Hemoglobin A1c  04/19/2023    Low Dose Statin  04/20/2023    Foot Exam  04/20/2023    PROSTATE-SPECIFIC ANTIGEN  10/19/2023    Hepatitis C Screening  Completed       History reviewed. No pertinent past medical history.    Past Surgical History:   Procedure Laterality Date    ADENOIDECTOMY  1965    EYE SURGERY  05/2020    Cataract removal R eye    TONSILLECTOMY  1965       family history includes Diabetes in his mother; Heart disease in his father and mother; Hypertension in his mother; Vision loss in his mother.    Social History     Tobacco Use    Smoking status: Never    Smokeless tobacco: Never   Substance Use Topics    Alcohol use: Yes     Alcohol/week: 7.0 standard drinks     Types: 4 Cans of beer, 3 Shots of liquor per week    Drug use: No       Review of Systems   Constitutional:  Negative for chills and fever.   HENT:  Negative for sore throat.    Respiratory:  Negative for cough and shortness of breath.    Cardiovascular:  Negative for chest pain and palpitations.   Gastrointestinal:  Negative for constipation, diarrhea, nausea and vomiting.   Genitourinary:  Negative for dysuria and hematuria.   Musculoskeletal:  Negative for falls.   Neurological:  Negative for headaches.      Outpatient Encounter Medications as of 10/26/2022   Medication Sig Dispense Refill    blood sugar diagnostic (ONETOUCH VERIO TEST STRIPS) Strp 1 strip by Misc.(Non-Drug; Combo Route) route 2 (two) times a day. 200 each 1    lancets 33 gauge Misc 1 lancet by Misc.(Non-Drug; Combo Route) route 2 (two) times a day. 200 each 1    tadalafiL (CIALIS) 20 MG Tab Take 1 tablet (20 mg total) by mouth daily as needed. 30 tablet 5    [DISCONTINUED] atorvastatin (LIPITOR) 20 MG tablet Take 1 tablet (20 mg total) by mouth once daily. 90 tablet 3    [DISCONTINUED] lisinopriL (PRINIVIL,ZESTRIL) 20 MG tablet Take 1 tablet (20 mg total) by mouth once daily. 90 tablet 3    [DISCONTINUED] metFORMIN  "(GLUCOPHAGE-XR) 500 MG ER 24hr tablet Take 2 tablets (1,000 mg total) by mouth daily with breakfast. 180 tablet 3    atorvastatin (LIPITOR) 20 MG tablet Take 1 tablet (20 mg total) by mouth once daily. 90 tablet 3    lisinopriL (PRINIVIL,ZESTRIL) 20 MG tablet Take 1 tablet (20 mg total) by mouth once daily. 90 tablet 3    metFORMIN (GLUCOPHAGE-XR) 500 MG ER 24hr tablet Take 2 tablets (1,000 mg total) by mouth daily with breakfast. 180 tablet 3     No facility-administered encounter medications on file as of 10/26/2022.        Review of patient's allergies indicates:   Allergen Reactions    No known drug allergies        Physical Exam      Vital Signs  Temp: 97.8 °F (36.6 °C)  Temp src: Oral  Pulse: 60  SpO2: 95 %  BP: 130/80  BP Location: Right arm  Patient Position: Sitting  Height and Weight  Height: 5' 10" (177.8 cm)  Weight: 133.8 kg (294 lb 15.6 oz)  BSA (Calculated - sq m): 2.57 sq meters  BMI (Calculated): 42.3  Weight in (lb) to have BMI = 25: 173.9]  Body mass index is 42.32 kg/m².    Physical Exam  Constitutional:       Appearance: He is well-developed.   HENT:      Head: Normocephalic and atraumatic.   Neck:      Thyroid: No thyromegaly.   Cardiovascular:      Rate and Rhythm: Normal rate and regular rhythm.      Heart sounds: No murmur heard.  Pulmonary:      Effort: Pulmonary effort is normal. No respiratory distress.      Breath sounds: Normal breath sounds.   Abdominal:      General: There is no distension.      Palpations: Abdomen is soft.      Tenderness: There is no abdominal tenderness.   Skin:     General: Skin is warm and dry.   Neurological:      Mental Status: He is alert and oriented to person, place, and time.   Psychiatric:         Behavior: Behavior normal.        Laboratory:  CBC:  Recent Labs   Lab Result Units 10/19/22  0643   WBC Thousand/uL 5.3   RBC Million/uL 4.72   Hemoglobin g/dL 14.7   Hematocrit % 43.3   Platelets Thousand/uL 161   MCV fL 91.7   MCH pg 31.1   MCHC g/dL 33.9 "     CMP:  Recent Labs   Lab Result Units 10/19/22  0643   Glucose mg/dL 129*   Calcium mg/dL 9.3   Albumin g/dL 4.3   Total Protein g/dL 6.6   Sodium mmol/L 138   Potassium mmol/L 4.4   CO2 mmol/L 27   Chloride mmol/L 103   BUN mg/dL 16   ALT U/L 19   AST U/L 26   Total Bilirubin mg/dL 1.2     URINALYSIS:  No results for input(s): COLORU, CLARITYU, SPECGRAV, PHUR, PROTEINUA, GLUCOSEU, BILIRUBINCON, BLOODU, WBCU, RBCU, BACTERIA, MUCUS, NITRITE, LEUKOCYTESUR, UROBILINOGEN, HYALINECASTS in the last 2160 hours.   LIPIDS:  No results for input(s): TSH, HDL, CHOL, TRIG, LDLCALC, CHOLHDL, NONHDLCHOL, TOTALCHOLEST in the last 2160 hours.    TSH:  No results for input(s): TSH in the last 2160 hours.  A1C:  Recent Labs   Lab Result Units 10/19/22  0643   Hemoglobin A1C % of total Hgb 7.9*       Radiology:  No results found in the last 30 days.     Assessment/Plan     Luís Torres is a 62 y.o.male with:    1. Type 2 diabetes mellitus with hyperglycemia, without long-term current use of insulin  - metFORMIN (GLUCOPHAGE-XR) 500 MG ER 24hr tablet; Take 2 tablets (1,000 mg total) by mouth daily with breakfast.  Dispense: 180 tablet; Refill: 3    2. Hypertension associated with diabetes  - lisinopriL (PRINIVIL,ZESTRIL) 20 MG tablet; Take 1 tablet (20 mg total) by mouth once daily.  Dispense: 90 tablet; Refill: 3    3. Hyperlipidemia due to type 2 diabetes mellitus  - atorvastatin (LIPITOR) 20 MG tablet; Take 1 tablet (20 mg total) by mouth once daily.  Dispense: 90 tablet; Refill: 3    4. Class 3 severe obesity due to excess calories with serious comorbidity and body mass index (BMI) of 40.0 to 44.9 in adult    5. Chronic pain of right knee     -counseled on increasing exercise and working on diet  -moderate risk for moderate risk surgery, ok to proceed with knee replacement if needed  -referred to Dr. Nichols for colonoscopy in [place], will call to schedule  -Continue current medications and maintain follow up with specialists.     -Follow up in about 6 months (around 4/26/2023) for Annual Visit.       Kirti Phelan MD  [facility]

## 2022-10-26 NOTE — ASSESSMENT & PLAN NOTE
Bowls 4 times per week but doesn't formally exercise.  Diet has not been good, has not been stress eating.

## 2022-11-14 ENCOUNTER — TELEPHONE (OUTPATIENT)
Dept: INTERNAL MEDICINE | Facility: CLINIC | Age: 62
End: 2022-11-14
Payer: COMMERCIAL

## 2022-11-14 RX ORDER — PROMETHAZINE HYDROCHLORIDE AND DEXTROMETHORPHAN HYDROBROMIDE 6.25; 15 MG/5ML; MG/5ML
5 SYRUP ORAL EVERY 4 HOURS PRN
Qty: 118 ML | Refills: 0 | Status: SHIPPED | OUTPATIENT
Start: 2022-11-14 | End: 2022-11-24

## 2022-11-14 NOTE — TELEPHONE ENCOUNTER
----- Message from Roxanne Angel sent at 11/14/2022  9:49 AM CST -----  Contact: Letty(wife)[phone]  Patient would like to get medical advice.  Symptoms (please be specific):   sinus with green mucus in the nose and cough/negative home Covid test  How long have you had these symptoms: 11/11/22  Would you like a call back, or a response through your [facility] portal?:   call back  Pharmacy name and phone # (copy from chart):     [facility] #[id number] - [place], [place] - [address] AT [address] [address]  Phone: [phone] Fax: [phone]   Comments:   Pt wife states the pt still has a z-pack and wants to know if it is ok for him to take it.       Requesting an RX refill or new RX.  Is this a refill or new RX: new   RX name and strength (copy/paste from chart):  Promethazine  Is this a 30 day or 90 day RX:   Pharmacy name and phone # (copy/paste from chart):  [facility] #[id number] - [place], [place] - [address] AT [address] [address]  Phone: [phone] Fax: [phone]    The doctors have asked that we provide their patients with the following 2 reminders -- prescription refills can take up to 72 hours, and a friendly reminder that in the future you can use your [facility] account to request refills: yes    Letty states the cough syrup was prescribed by the ENT.

## 2022-11-17 ENCOUNTER — TELEPHONE (OUTPATIENT)
Dept: INTERNAL MEDICINE | Facility: CLINIC | Age: 62
End: 2022-11-17
Payer: COMMERCIAL

## 2022-11-17 RX ORDER — ALBUTEROL SULFATE 90 UG/1
2 AEROSOL, METERED RESPIRATORY (INHALATION) EVERY 6 HOURS PRN
Qty: 18 G | Refills: 0 | Status: SHIPPED | OUTPATIENT
Start: 2022-11-17 | End: 2022-12-08

## 2022-11-17 NOTE — TELEPHONE ENCOUNTER
----- Message from Mila Gomez sent at 11/17/2022  3:25 PM CST -----  Contact: pt [phone]  Pt wife stated Luís not feeling better coughing, covid - and do you think he need to come in for visit or let cough run its coarse.    Please call and advise

## 2022-11-18 RX ORDER — AMOXICILLIN AND CLAVULANATE POTASSIUM 875; 125 MG/1; MG/1
1 TABLET, FILM COATED ORAL EVERY 12 HOURS
Qty: 20 TABLET | Refills: 0 | Status: SHIPPED | OUTPATIENT
Start: 2022-11-18 | End: 2023-04-26

## 2022-11-18 RX ORDER — METHYLPREDNISOLONE 4 MG/1
TABLET ORAL
Qty: 21 EACH | Refills: 0 | Status: SHIPPED | OUTPATIENT
Start: 2022-11-18 | End: 2022-12-09

## 2023-04-03 ENCOUNTER — PATIENT MESSAGE (OUTPATIENT)
Dept: ADMINISTRATIVE | Facility: HOSPITAL | Age: 63
End: 2023-04-03
Payer: COMMERCIAL

## 2023-04-21 ENCOUNTER — PATIENT MESSAGE (OUTPATIENT)
Dept: ADMINISTRATIVE | Facility: HOSPITAL | Age: 63
End: 2023-04-21
Payer: COMMERCIAL

## 2023-04-21 LAB
ALBUMIN SERPL-MCNC: 4.2 G/DL (ref 3.6–5.1)
ALBUMIN/GLOB SERPL: 1.9 (CALC) (ref 1–2.5)
ALP SERPL-CCNC: 37 U/L (ref 35–144)
ALT SERPL-CCNC: 10 U/L (ref 9–46)
AST SERPL-CCNC: 12 U/L (ref 10–35)
BILIRUB SERPL-MCNC: 1 MG/DL (ref 0.2–1.2)
BUN SERPL-MCNC: 29 MG/DL (ref 7–25)
BUN/CREAT SERPL: 28 (CALC) (ref 6–22)
CALCIUM SERPL-MCNC: 9.1 MG/DL (ref 8.6–10.3)
CHLORIDE SERPL-SCNC: 104 MMOL/L (ref 98–110)
CO2 SERPL-SCNC: 28 MMOL/L (ref 20–32)
CREAT SERPL-MCNC: 1.02 MG/DL (ref 0.7–1.35)
EGFR: 83 ML/MIN/1.73M2
GLOBULIN SER CALC-MCNC: 2.2 G/DL (CALC) (ref 1.9–3.7)
GLUCOSE SERPL-MCNC: 185 MG/DL (ref 65–99)
HBA1C MFR BLD: 7.5 % OF TOTAL HGB
POTASSIUM SERPL-SCNC: 4.3 MMOL/L (ref 3.5–5.3)
PROT SERPL-MCNC: 6.4 G/DL (ref 6.1–8.1)
SODIUM SERPL-SCNC: 140 MMOL/L (ref 135–146)

## 2023-04-26 ENCOUNTER — OFFICE VISIT (OUTPATIENT)
Dept: PRIMARY CARE CLINIC | Facility: CLINIC | Age: 63
End: 2023-04-26
Payer: COMMERCIAL

## 2023-04-26 ENCOUNTER — LAB VISIT (OUTPATIENT)
Dept: LAB | Facility: HOSPITAL | Age: 63
End: 2023-04-26
Attending: INTERNAL MEDICINE
Payer: COMMERCIAL

## 2023-04-26 VITALS
SYSTOLIC BLOOD PRESSURE: 124 MMHG | OXYGEN SATURATION: 96 % | HEIGHT: 70 IN | BODY MASS INDEX: 42.9 KG/M2 | HEART RATE: 68 BPM | DIASTOLIC BLOOD PRESSURE: 82 MMHG | WEIGHT: 299.63 LBS

## 2023-04-26 DIAGNOSIS — M25.561 CHRONIC PAIN OF RIGHT KNEE: ICD-10-CM

## 2023-04-26 DIAGNOSIS — E78.1 HYPERTRIGLYCERIDEMIA: ICD-10-CM

## 2023-04-26 DIAGNOSIS — Z00.00 ANNUAL PHYSICAL EXAM: Primary | ICD-10-CM

## 2023-04-26 DIAGNOSIS — E11.65 TYPE 2 DIABETES MELLITUS WITH HYPERGLYCEMIA, WITHOUT LONG-TERM CURRENT USE OF INSULIN: ICD-10-CM

## 2023-04-26 DIAGNOSIS — Z12.12 ENCOUNTER FOR COLORECTAL CANCER SCREENING: ICD-10-CM

## 2023-04-26 DIAGNOSIS — I15.2 HYPERTENSION ASSOCIATED WITH DIABETES: ICD-10-CM

## 2023-04-26 DIAGNOSIS — Z12.11 ENCOUNTER FOR COLORECTAL CANCER SCREENING: ICD-10-CM

## 2023-04-26 DIAGNOSIS — G89.29 CHRONIC PAIN OF RIGHT KNEE: ICD-10-CM

## 2023-04-26 DIAGNOSIS — N52.9 ERECTILE DYSFUNCTION, UNSPECIFIED ERECTILE DYSFUNCTION TYPE: ICD-10-CM

## 2023-04-26 DIAGNOSIS — Z12.5 SCREENING PSA (PROSTATE SPECIFIC ANTIGEN): ICD-10-CM

## 2023-04-26 DIAGNOSIS — E78.5 HYPERLIPIDEMIA DUE TO TYPE 2 DIABETES MELLITUS: ICD-10-CM

## 2023-04-26 DIAGNOSIS — E11.69 HYPERLIPIDEMIA DUE TO TYPE 2 DIABETES MELLITUS: ICD-10-CM

## 2023-04-26 DIAGNOSIS — E11.59 HYPERTENSION ASSOCIATED WITH DIABETES: ICD-10-CM

## 2023-04-26 DIAGNOSIS — E66.01 CLASS 3 SEVERE OBESITY DUE TO EXCESS CALORIES WITH SERIOUS COMORBIDITY AND BODY MASS INDEX (BMI) OF 40.0 TO 44.9 IN ADULT: ICD-10-CM

## 2023-04-26 LAB
CHOLEST SERPL-MCNC: 133 MG/DL (ref 120–199)
CHOLEST/HDLC SERPL: 4.2 {RATIO} (ref 2–5)
HDLC SERPL-MCNC: 32 MG/DL (ref 40–75)
HDLC SERPL: 24.1 % (ref 20–50)
LDLC SERPL CALC-MCNC: 64 MG/DL (ref 63–159)
NONHDLC SERPL-MCNC: 101 MG/DL
TRIGL SERPL-MCNC: 185 MG/DL (ref 30–150)

## 2023-04-26 PROCEDURE — 36415 COLL VENOUS BLD VENIPUNCTURE: CPT | Performed by: INTERNAL MEDICINE

## 2023-04-26 PROCEDURE — 99396 PREV VISIT EST AGE 40-64: CPT | Mod: S$GLB,,, | Performed by: INTERNAL MEDICINE

## 2023-04-26 PROCEDURE — 99999 PR PBB SHADOW E&M-EST. PATIENT-LVL III: ICD-10-PCS | Mod: PBBFAC,,, | Performed by: INTERNAL MEDICINE

## 2023-04-26 PROCEDURE — 80061 LIPID PANEL: CPT | Performed by: INTERNAL MEDICINE

## 2023-04-26 PROCEDURE — 99999 PR PBB SHADOW E&M-EST. PATIENT-LVL III: CPT | Mod: PBBFAC,,, | Performed by: INTERNAL MEDICINE

## 2023-04-26 PROCEDURE — 99396 PR PREVENTIVE VISIT,EST,40-64: ICD-10-PCS | Mod: S$GLB,,, | Performed by: INTERNAL MEDICINE

## 2023-04-26 NOTE — PROGRESS NOTES
[facility] Note    Chief Complaint      Chief Complaint   Patient presents with    Annual Exam     History of Present Illness      Luís Torres is a 63 y.o. male who presents today for Annual preventative visit.  Patient comes to appointment alone.  Ortho: [facility]    Problem List Items Addressed This Visit       Hypertension associated with diabetes    Current Assessment & Plan     BP controlled on lisinopril 20 mg daily, no CP/SOB/HA.  Doesn't add salt.           Hyperlipidemia due to type 2 diabetes mellitus    Current Assessment & Plan     Stable on lipitor 20 mg daily, no myalgias.  The ASCVD Risk score (Jonas SHEEHAN, et al., 2019) failed to calculate for the following reasons:    The valid total cholesterol range is 130 to 320 mg/dL             Relevant Orders    Lipid Panel    Type 2 diabetes mellitus with hyperglycemia, without long-term current use of insulin    Current Assessment & Plan     A1C 9.5 in 8/2021, improved to 7.5 on 4/2023 labs. Stable on metformin 1000 mg ER, no hypoglycemia.  Had diarrhea with instant release dosing.             Relevant Orders    Microalbumin/Creatinine Ratio, Urine    Hemoglobin A1C    Class 3 severe obesity due to excess calories with serious comorbidity and body mass index (BMI) of 40.0 to 44.9 in adult    Current Assessment & Plan     Bowls 4 times per week but doesn't formally exercise.  Diet has not been good, has not been stress eating.           Erectile dysfunction    Current Assessment & Plan     Stable on cialis           Hypertriglyceridemia    Current Assessment & Plan     Up to 791 in 1/2021, much improved at 155 on 4/2022 labs.           Chronic pain of right knee    Current Assessment & Plan     Sees Dr. Earl, s/p injection recently which did not help (they had helped in the past). Will likely need knee replacement.  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            Other Visit Diagnoses        Annual physical exam    -  Primary    Relevant Orders    CBC Auto Differential    Comprehensive Metabolic Panel    Lipid Panel    Hemoglobin A1C    Screening PSA (prostate specific antigen)        Relevant Orders    PSA, SCREENING    Encounter for colorectal cancer screening        Relevant Orders    Case Request Endoscopy: COLONOSCOPY (Completed)            Health Maintenance   Topic Date Due    TETANUS VACCINE  Never done    Eye Exam  03/25/2023    Lipid Panel  04/15/2023    PROSTATE-SPECIFIC ANTIGEN  10/19/2023    Hemoglobin A1c  10/20/2023    Low Dose Statin  04/26/2024    Foot Exam  04/26/2024    Hepatitis C Screening  Completed       History reviewed. No pertinent past medical history.    Past Surgical History:   Procedure Laterality Date    ADENOIDECTOMY  1965    EYE SURGERY  05/2020    Cataract removal R eye    TONSILLECTOMY  1965       family history includes Diabetes in his mother; Heart disease in his father and mother; Hypertension in his mother; Vision loss in his mother.    Social History     Tobacco Use    Smoking status: Never    Smokeless tobacco: Never   Substance Use Topics    Alcohol use: Yes     Alcohol/week: 6.0 standard drinks     Types: 4 Cans of beer, 2 Shots of liquor per week    Drug use: No       Review of Systems   Constitutional:  Negative for chills and fever.   Respiratory:  Negative for cough and shortness of breath.    Cardiovascular:  Negative for chest pain and palpitations.   Gastrointestinal:  Negative for constipation, diarrhea, nausea and vomiting.   Genitourinary:  Negative for dysuria and hematuria.   Musculoskeletal:  Negative for falls.   Neurological:  Negative for headaches.      Outpatient Encounter Medications as of 4/26/2023   Medication Sig Dispense Refill    atorvastatin (LIPITOR) 20 MG tablet Take 1 tablet (20 mg total) by mouth once daily. 90 tablet 3    blood sugar diagnostic (ONETOUCH VERIO TEST STRIPS) Strp 1 strip by Misc.(Non-Drug; Combo  "Route) route 2 (two) times a day. 200 each 1    lancets 33 gauge Misc 1 lancet by Misc.(Non-Drug; Combo Route) route 2 (two) times a day. 200 each 1    lisinopriL (PRINIVIL,ZESTRIL) 20 MG tablet Take 1 tablet (20 mg total) by mouth once daily. 90 tablet 3    metFORMIN (GLUCOPHAGE-XR) 500 MG ER 24hr tablet Take 2 tablets (1,000 mg total) by mouth daily with breakfast. 180 tablet 3    tadalafiL (CIALIS) 20 MG Tab Take 1 tablet (20 mg total) by mouth daily as needed. 30 tablet 5    [DISCONTINUED] albuterol (PROVENTIL/VENTOLIN HFA) 90 mcg/actuation inhaler INHAL 2 PUFFS BY MOUTH EVERY 6 HOURS AS NEEDED FOR WHEEZING RESCUE 6.7 g 5    [DISCONTINUED] amoxicillin-clavulanate 875-125mg (AUGMENTIN) 875-125 mg per tablet Take 1 tablet by mouth every 12 (twelve) hours. 20 tablet 0     No facility-administered encounter medications on file as of 4/26/2023.        Review of patient's allergies indicates:   Allergen Reactions    No known drug allergies        Physical Exam      Vital Signs  Pulse: 68  SpO2: 96 %  BP: 124/82  Pain Score:   5  Pain Loc: Leg  Height and Weight  Height: 5' 10" (177.8 cm)  Weight: 135.9 kg (299 lb 9.7 oz)  BSA (Calculated - sq m): 2.59 sq meters  BMI (Calculated): 43  Weight in (lb) to have BMI = 25: 173.9]  Body mass index is 42.99 kg/m².    Physical Exam  Constitutional:       Appearance: He is well-developed.   HENT:      Head: Normocephalic and atraumatic.   Neck:      Thyroid: No thyromegaly.   Cardiovascular:      Rate and Rhythm: Normal rate and regular rhythm.      Heart sounds: No murmur heard.  Pulmonary:      Effort: Pulmonary effort is normal. No respiratory distress.      Breath sounds: Normal breath sounds.   Abdominal:      General: There is no distension.      Palpations: Abdomen is soft.      Tenderness: There is no abdominal tenderness.   Skin:     General: Skin is warm and dry.   Neurological:      Mental Status: He is alert and oriented to person, place, and time. "   Psychiatric:         Behavior: Behavior normal.        Laboratory:  CBC:  No results for input(s): WBC, RBC, HGB, HCT, PLT, MCV, MCH, MCHC in the last 2160 hours.    CMP:  Recent Labs   Lab Result Units 04/20/23  0717   Glucose mg/dL 185*   Calcium mg/dL 9.1   Albumin g/dL 4.2   Total Protein g/dL 6.4   Sodium mmol/L 140   Potassium mmol/L 4.3   CO2 mmol/L 28   Chloride mmol/L 104   BUN mg/dL 29*   ALT U/L 10   AST U/L 12   Total Bilirubin mg/dL 1.0     URINALYSIS:  No results for input(s): COLORU, CLARITYU, SPECGRAV, PHUR, PROTEINUA, GLUCOSEU, BILIRUBINCON, BLOODU, WBCU, RBCU, BACTERIA, MUCUS, NITRITE, LEUKOCYTESUR, UROBILINOGEN, HYALINECASTS in the last 2160 hours.   LIPIDS:  No results for input(s): TSH, HDL, CHOL, TRIG, LDLCALC, CHOLHDL, NONHDLCHOL, TOTALCHOLEST in the last 2160 hours.    TSH:  No results for input(s): TSH in the last 2160 hours.  A1C:  Recent Labs   Lab Result Units 04/20/23  0717   Hemoglobin A1C % of total Hgb 7.5*       Radiology:  No results found in the last 30 days.     Assessment/Plan     Luís Torres is a 63 y.o.male with:    1. Annual physical exam  - CBC Auto Differential; Future  - Comprehensive Metabolic Panel; Future  - Lipid Panel; Future  - Hemoglobin A1C; Future  - CBC Auto Differential  - Comprehensive Metabolic Panel  - Lipid Panel  - Hemoglobin A1C    2. Screening PSA (prostate specific antigen)  - PSA, SCREENING; Future  - PSA, SCREENING    3. Type 2 diabetes mellitus with hyperglycemia, without long-term current use of insulin  - Microalbumin/Creatinine Ratio, Urine; Future  - Hemoglobin A1C; Future  - Microalbumin/Creatinine Ratio, Urine  - Hemoglobin A1C    4. Hypertriglyceridemia    5. Hypertension associated with diabetes    6. Hyperlipidemia due to type 2 diabetes mellitus  - Lipid Panel; Future    7. Erectile dysfunction, unspecified erectile dysfunction type    8. Class 3 severe obesity due to excess calories with serious comorbidity and body mass index (BMI) of  40.0 to 44.9 in adult    9. Chronic pain of right knee    10. Encounter for colorectal cancer screening  - Case Request Endoscopy: COLONOSCOPY       -counseled on increasing exercise and working on diet  -scheduled already for eye exam, cscope in [place]  -Continue current medications and maintain follow up with specialists.    -Follow up in about 6 months (around 10/26/2023) for follow up of medical problems.       Kirti Phelan MD  [facility]

## 2023-04-26 NOTE — ASSESSMENT & PLAN NOTE
A1C 9.5 in 8/2021, improved to 7.5 on 4/2023 labs. Stable on metformin 1000 mg ER, no hypoglycemia.  Had diarrhea with instant release dosing.

## 2023-04-26 NOTE — ASSESSMENT & PLAN NOTE
Sees Dr. Earl, s/p injection recently which did not help (they had helped in the past). Will likely need knee replacement.  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

## 2023-05-19 LAB
LEFT EYE DM RETINOPATHY: NEGATIVE
RIGHT EYE DM RETINOPATHY: NEGATIVE

## 2023-05-22 ENCOUNTER — PATIENT OUTREACH (OUTPATIENT)
Dept: ADMINISTRATIVE | Facility: HOSPITAL | Age: 63
End: 2023-05-22
Payer: COMMERCIAL

## 2023-05-22 NOTE — PROGRESS NOTES
Health Maintenance Due   Topic Date Due    Pneumococcal Vaccines (Age 0-64) (1 - PCV) Never done    TETANUS VACCINE  Never done    Shingles Vaccine (1 of 2) Never done    Colorectal Cancer Screening  09/01/2021    COVID-19 Vaccine (4 - Booster for Pfizer series) 12/31/2021     Triggered LINKS. Updated Care Everywhere. Imported outside diabetic eye exam results received from [facility] into ; negative retinopathy. Chart review completed.

## 2023-06-21 RX ORDER — TADALAFIL 20 MG/1
TABLET ORAL
Qty: 30 TABLET | Refills: 3 | Status: SHIPPED | OUTPATIENT
Start: 2023-06-21

## 2023-06-21 NOTE — TELEPHONE ENCOUNTER
No care due was identified.  [facility] Embedded Care Due Messages. Reference number: [id number].   6/21/2023 3:36:38 PM CDT

## 2023-06-21 NOTE — TELEPHONE ENCOUNTER
Refill Decision Note   Luís Torres  is requesting a refill authorization.  Brief Assessment and Rationale for Refill:  Approve     Medication Therapy Plan:  previous order matches    Medication Reconciliation Completed: No   Comments:     No Care Gaps recommended.     Note composed:3:38 PM 06/21/2023

## 2023-08-18 ENCOUNTER — OFFICE VISIT (OUTPATIENT)
Dept: PRIMARY CARE CLINIC | Facility: CLINIC | Age: 63
End: 2023-08-18
Payer: COMMERCIAL

## 2023-08-18 VITALS
SYSTOLIC BLOOD PRESSURE: 122 MMHG | WEIGHT: 303.38 LBS | BODY MASS INDEX: 43.43 KG/M2 | OXYGEN SATURATION: 96 % | DIASTOLIC BLOOD PRESSURE: 82 MMHG | HEIGHT: 70 IN | HEART RATE: 77 BPM

## 2023-08-18 DIAGNOSIS — E78.5 HYPERLIPIDEMIA DUE TO TYPE 2 DIABETES MELLITUS: ICD-10-CM

## 2023-08-18 DIAGNOSIS — E66.01 CLASS 3 SEVERE OBESITY DUE TO EXCESS CALORIES WITH SERIOUS COMORBIDITY AND BODY MASS INDEX (BMI) OF 40.0 TO 44.9 IN ADULT: ICD-10-CM

## 2023-08-18 DIAGNOSIS — G89.29 CHRONIC PAIN OF RIGHT KNEE: Primary | ICD-10-CM

## 2023-08-18 DIAGNOSIS — Z01.818 PRE-OP EXAMINATION: ICD-10-CM

## 2023-08-18 DIAGNOSIS — E11.69 HYPERLIPIDEMIA DUE TO TYPE 2 DIABETES MELLITUS: ICD-10-CM

## 2023-08-18 DIAGNOSIS — E11.65 TYPE 2 DIABETES MELLITUS WITH HYPERGLYCEMIA, WITHOUT LONG-TERM CURRENT USE OF INSULIN: ICD-10-CM

## 2023-08-18 DIAGNOSIS — M25.561 CHRONIC PAIN OF RIGHT KNEE: Primary | ICD-10-CM

## 2023-08-18 DIAGNOSIS — I15.2 HYPERTENSION ASSOCIATED WITH DIABETES: ICD-10-CM

## 2023-08-18 DIAGNOSIS — E11.59 HYPERTENSION ASSOCIATED WITH DIABETES: ICD-10-CM

## 2023-08-18 PROCEDURE — 93005 ELECTROCARDIOGRAM TRACING: CPT | Mod: S$GLB,,, | Performed by: INTERNAL MEDICINE

## 2023-08-18 PROCEDURE — 93010 EKG 12-LEAD: ICD-10-PCS | Mod: S$GLB,,, | Performed by: INTERNAL MEDICINE

## 2023-08-18 PROCEDURE — 99214 OFFICE O/P EST MOD 30 MIN: CPT | Mod: S$GLB,,, | Performed by: INTERNAL MEDICINE

## 2023-08-18 PROCEDURE — 99999 PR PBB SHADOW E&M-EST. PATIENT-LVL III: ICD-10-PCS | Mod: PBBFAC,,, | Performed by: INTERNAL MEDICINE

## 2023-08-18 PROCEDURE — 99999 PR PBB SHADOW E&M-EST. PATIENT-LVL III: CPT | Mod: PBBFAC,,, | Performed by: INTERNAL MEDICINE

## 2023-08-18 PROCEDURE — 93010 ELECTROCARDIOGRAM REPORT: CPT | Mod: S$GLB,,, | Performed by: INTERNAL MEDICINE

## 2023-08-18 PROCEDURE — 93005 EKG 12-LEAD: ICD-10-PCS | Mod: S$GLB,,, | Performed by: INTERNAL MEDICINE

## 2023-08-18 PROCEDURE — 99214 PR OFFICE/OUTPT VISIT, EST, LEVL IV, 30-39 MIN: ICD-10-PCS | Mod: S$GLB,,, | Performed by: INTERNAL MEDICINE

## 2023-08-18 RX ORDER — ATORVASTATIN CALCIUM 20 MG/1
20 TABLET, FILM COATED ORAL DAILY
Qty: 90 TABLET | Refills: 3 | Status: SHIPPED | OUTPATIENT
Start: 2023-08-18 | End: 2023-11-21

## 2023-08-18 RX ORDER — LISINOPRIL 20 MG/1
20 TABLET ORAL DAILY
Qty: 90 TABLET | Refills: 3 | Status: SHIPPED | OUTPATIENT
Start: 2023-08-18 | End: 2023-11-21

## 2023-08-18 RX ORDER — METFORMIN HYDROCHLORIDE 500 MG/1
1000 TABLET, EXTENDED RELEASE ORAL
Qty: 180 TABLET | Refills: 3 | Status: SHIPPED | OUTPATIENT
Start: 2023-08-18 | End: 2023-11-21

## 2023-08-18 NOTE — ASSESSMENT & PLAN NOTE
A1C 9.5 in 8/2021, improved to 7.5 on 4/2023 labs. Stable on metformin 1000 mg ER, no hypoglycemia.  Had diarrhea with instant release dosing.     Bedside shift change report given to Adzilla (oncoming nurse) by Wilber Howell (offgoing nurse). Report included the following information SBAR, Kardex, Intake/Output, MAR and Recent Results.

## 2023-08-18 NOTE — PROGRESS NOTES
[facility] Note    Chief Complaint      Chief Complaint   Patient presents with    Pre-op Exam     History of Present Illness      Luís Torres is a 63 y.o. male who presents today for preop for right knee replacement.  Patient comes to appointment alone.  Ortho: [facility]    Planning for right knee replacement with Dr. Núñez on 9/13/2023.  No CP/SOB, no issues with anesthesia in past.  Does all ADL's without difficulty, can walk up stairs, mows grass.    Problem List Items Addressed This Visit       Hypertension associated with diabetes    Relevant Orders    IN OFFICE EKG 12-LEAD (to [facility])    Type 2 diabetes mellitus with hyperglycemia, without long-term current use of insulin    Current Assessment & Plan     A1C 9.5 in 8/2021, improved to 7.5 on 4/2023 labs. Stable on metformin 1000 mg ER, no hypoglycemia.  Had diarrhea with instant release dosing.           Class 3 severe obesity due to excess calories with serious comorbidity and body mass index (BMI) of 40.0 to 44.9 in adult    Current Assessment & Plan     Bowls 4 times per week but doesn't formally exercise.  Diet has not been good, has not been stress eating.         Chronic pain of right knee - Primary     Other Visit Diagnoses       Pre-op examination                Health Maintenance   Topic Date Due    TETANUS VACCINE  Never done    Shingles Vaccine (1 of 2) Never done    Colorectal Cancer Screening  09/01/2021    PROSTATE-SPECIFIC ANTIGEN  10/19/2023    Hemoglobin A1c  10/20/2023    Foot Exam  04/26/2024    Lipid Panel  04/26/2024    Eye Exam  05/19/2024    Low Dose Statin  08/18/2024    Hepatitis C Screening  Completed       History reviewed. No pertinent past medical history.    Past Surgical History:   Procedure Laterality Date    ADENOIDECTOMY  1965    EYE SURGERY  05/2020    Cataract removal R eye    TONSILLECTOMY  1965       family history includes Diabetes in his mother; Heart disease in his father and mother; Hypertension in  "his mother; Vision loss in his mother.    Social History     Tobacco Use    Smoking status: Never    Smokeless tobacco: Never   Substance Use Topics    Alcohol use: Yes     Alcohol/week: 6.0 standard drinks of alcohol     Types: 4 Cans of beer, 2 Shots of liquor per week    Drug use: No       Review of Systems   Constitutional:  Negative for chills and fever.   Respiratory:  Negative for cough and shortness of breath.    Cardiovascular:  Negative for chest pain and palpitations.   Gastrointestinal:  Negative for constipation, diarrhea, nausea and vomiting.   Genitourinary:  Negative for dysuria and hematuria.   Musculoskeletal:  Negative for falls.   Neurological:  Negative for headaches.        Outpatient Encounter Medications as of 8/18/2023   Medication Sig Dispense Refill    atorvastatin (LIPITOR) 20 MG tablet Take 1 tablet (20 mg total) by mouth once daily. 90 tablet 3    blood sugar diagnostic (ONETOUCH VERIO TEST STRIPS) Strp 1 strip by Misc.(Non-Drug; Combo Route) route 2 (two) times a day. 200 each 1    lancets 33 gauge Misc 1 lancet by Misc.(Non-Drug; Combo Route) route 2 (two) times a day. 200 each 1    lisinopriL (PRINIVIL,ZESTRIL) 20 MG tablet Take 1 tablet (20 mg total) by mouth once daily. 90 tablet 3    metFORMIN (GLUCOPHAGE-XR) 500 MG ER 24hr tablet Take 2 tablets (1,000 mg total) by mouth daily with breakfast. 180 tablet 3    tadalafiL (CIALIS) 20 MG Tab TAKE ONE TABLET BY MOUTH EVERY DAY AS NEEDED 30 tablet 3     No facility-administered encounter medications on file as of 8/18/2023.        Review of patient's allergies indicates:   Allergen Reactions    No known drug allergies        Physical Exam      Vital Signs  Pulse: 77  SpO2: 96 %  BP: 122/82  Pain Score:   8  Pain Loc: Knee  Height and Weight  Height: 5' 10" (177.8 cm)  Weight: (!) 137.6 kg (303 lb 5.7 oz)  BSA (Calculated - sq m): 2.61 sq meters  BMI (Calculated): 43.5  Weight in (lb) to have BMI = 25: 173.9]  Body mass index is 43.53 " "kg/m².    Physical Exam  Constitutional:       Appearance: He is well-developed.   HENT:      Head: Normocephalic and atraumatic.   Neck:      Thyroid: No thyromegaly.   Cardiovascular:      Rate and Rhythm: Normal rate and regular rhythm.      Heart sounds: No murmur heard.  Pulmonary:      Effort: Pulmonary effort is normal. No respiratory distress.      Breath sounds: Normal breath sounds.   Abdominal:      General: There is no distension.      Palpations: Abdomen is soft.      Tenderness: There is no abdominal tenderness.   Skin:     General: Skin is warm and dry.   Neurological:      Mental Status: He is alert and oriented to person, place, and time.   Psychiatric:         Behavior: Behavior normal.          Laboratory:  CBC:  No results for input(s): "WBC", "RBC", "HGB", "HCT", "PLT", "MCV", "MCH", "MCHC" in the last 2160 hours.    CMP:  No results for input(s): "GLU", "CALCIUM", "ALBUMIN", "PROT", "NA", "K", "CO2", "CL", "BUN", "ALKPHOS", "ALT", "AST", "BILITOT" in the last 2160 hours.    Invalid input(s): "CREATININ"    URINALYSIS:  No results for input(s): "COLORU", "CLARITYU", "SPECGRAV", "PHUR", "PROTEINUA", "GLUCOSEU", "BILIRUBINCON", "BLOODU", "WBCU", "RBCU", "BACTERIA", "MUCUS", "NITRITE", "LEUKOCYTESUR", "UROBILINOGEN", "HYALINECASTS" in the last 2160 hours.   LIPIDS:  No results for input(s): "TSH", "HDL", "CHOL", "TRIG", "LDLCALC", "CHOLHDL", "NONHDLCHOL", "TOTALCHOLEST" in the last 2160 hours.    TSH:  No results for input(s): "TSH" in the last 2160 hours.  A1C:  No results for input(s): "HGBA1C" in the last 2160 hours.      Radiology:  No results found in the last 30 days.     Assessment/Plan     Luís Torres is a 63 y.o.male with:    1. Chronic pain of right knee    2. Pre-op examination    3. Hypertension associated with diabetes  - IN OFFICE EKG 12-LEAD (to [facility])    4. Class 3 severe obesity due to excess calories with serious comorbidity and body mass index (BMI) of 40.0 to 44.9 in " adult    5. Type 2 diabetes mellitus with hyperglycemia, without long-term current use of insulin       -EKG Today unremarkable, labs done 4/2023  -moderate risk (controlled DM2, HTN, obesity) for moderate risk surgery, ok to proceed without further workup given lack of active cardiac conditions  -Continue current medications and maintain follow up with specialists.    -Follow up if symptoms worsen or fail to improve.       Kirti Phelan MD  [facility]

## 2023-11-13 ENCOUNTER — TELEPHONE (OUTPATIENT)
Dept: PRIMARY CARE CLINIC | Facility: CLINIC | Age: 63
End: 2023-11-13
Payer: COMMERCIAL

## 2023-11-13 DIAGNOSIS — E11.65 TYPE 2 DIABETES MELLITUS WITH HYPERGLYCEMIA, WITHOUT LONG-TERM CURRENT USE OF INSULIN: ICD-10-CM

## 2023-11-13 DIAGNOSIS — Z00.00 ANNUAL PHYSICAL EXAM: ICD-10-CM

## 2023-11-13 DIAGNOSIS — Z12.5 SCREENING PSA (PROSTATE SPECIFIC ANTIGEN): Primary | ICD-10-CM

## 2023-11-13 NOTE — TELEPHONE ENCOUNTER
----- Message from Sadie Muniz MA sent at 11/13/2023  9:32 AM CST -----  Contact: [phone]  Pt called                In regards to needing to fax lab orders to [facility] to get blood work done there.              Fax number:[phone]

## 2023-11-14 LAB
ALBUMIN/CREAT UR: 25 MCG/MG CREAT
CREAT UR-MCNC: 139 MG/DL (ref 20–320)
MICROALBUMIN UR-MCNC: 3.5 MG/DL

## 2023-11-15 ENCOUNTER — OFFICE VISIT (OUTPATIENT)
Dept: PRIMARY CARE CLINIC | Facility: CLINIC | Age: 63
End: 2023-11-15
Payer: COMMERCIAL

## 2023-11-15 VITALS
HEIGHT: 70 IN | WEIGHT: 301.38 LBS | HEART RATE: 96 BPM | DIASTOLIC BLOOD PRESSURE: 80 MMHG | OXYGEN SATURATION: 67 % | BODY MASS INDEX: 43.14 KG/M2 | SYSTOLIC BLOOD PRESSURE: 130 MMHG

## 2023-11-15 DIAGNOSIS — E78.1 HYPERTRIGLYCERIDEMIA: ICD-10-CM

## 2023-11-15 DIAGNOSIS — E11.69 HYPERLIPIDEMIA DUE TO TYPE 2 DIABETES MELLITUS: ICD-10-CM

## 2023-11-15 DIAGNOSIS — I15.2 HYPERTENSION ASSOCIATED WITH DIABETES: Primary | ICD-10-CM

## 2023-11-15 DIAGNOSIS — E11.65 TYPE 2 DIABETES MELLITUS WITH HYPERGLYCEMIA, WITHOUT LONG-TERM CURRENT USE OF INSULIN: ICD-10-CM

## 2023-11-15 DIAGNOSIS — E78.5 HYPERLIPIDEMIA DUE TO TYPE 2 DIABETES MELLITUS: ICD-10-CM

## 2023-11-15 DIAGNOSIS — E11.59 HYPERTENSION ASSOCIATED WITH DIABETES: Primary | ICD-10-CM

## 2023-11-15 DIAGNOSIS — E66.01 CLASS 3 SEVERE OBESITY DUE TO EXCESS CALORIES WITH SERIOUS COMORBIDITY AND BODY MASS INDEX (BMI) OF 40.0 TO 44.9 IN ADULT: ICD-10-CM

## 2023-11-15 PROBLEM — G89.29 CHRONIC PAIN OF RIGHT KNEE: Status: RESOLVED | Noted: 2022-10-26 | Resolved: 2023-11-15

## 2023-11-15 PROBLEM — M25.561 CHRONIC PAIN OF RIGHT KNEE: Status: RESOLVED | Noted: 2022-10-26 | Resolved: 2023-11-15

## 2023-11-15 LAB
ALBUMIN SERPL-MCNC: 4.8 G/DL (ref 3.6–5.1)
ALBUMIN/GLOB SERPL: 1.8 (CALC) (ref 1–2.5)
ALP SERPL-CCNC: 52 U/L (ref 35–144)
ALT SERPL-CCNC: 12 U/L (ref 9–46)
AST SERPL-CCNC: 12 U/L (ref 10–35)
BASOPHILS # BLD AUTO: 40 CELLS/UL (ref 0–200)
BASOPHILS NFR BLD AUTO: 0.5 %
BILIRUB SERPL-MCNC: 0.8 MG/DL (ref 0.2–1.2)
BUN SERPL-MCNC: 22 MG/DL (ref 7–25)
BUN/CREAT SERPL: ABNORMAL (CALC) (ref 6–22)
CALCIUM SERPL-MCNC: 10 MG/DL (ref 8.6–10.3)
CHLORIDE SERPL-SCNC: 103 MMOL/L (ref 98–110)
CHOLEST SERPL-MCNC: 171 MG/DL
CHOLEST/HDLC SERPL: 4 (CALC)
CO2 SERPL-SCNC: 29 MMOL/L (ref 20–32)
CREAT SERPL-MCNC: 0.79 MG/DL (ref 0.7–1.35)
EGFR: 100 ML/MIN/1.73M2
EOSINOPHIL # BLD AUTO: 160 CELLS/UL (ref 15–500)
EOSINOPHIL NFR BLD AUTO: 2 %
ERYTHROCYTE [DISTWIDTH] IN BLOOD BY AUTOMATED COUNT: 12.8 % (ref 11–15)
GLOBULIN SER CALC-MCNC: 2.7 G/DL (CALC) (ref 1.9–3.7)
GLUCOSE SERPL-MCNC: 176 MG/DL (ref 65–99)
HBA1C MFR BLD: 7.7 % OF TOTAL HGB
HCT VFR BLD AUTO: 51.2 % (ref 38.5–50)
HDLC SERPL-MCNC: 43 MG/DL
HGB BLD-MCNC: 16.1 G/DL (ref 13.2–17.1)
LDLC SERPL CALC-MCNC: 96 MG/DL (CALC)
LYMPHOCYTES # BLD AUTO: 1872 CELLS/UL (ref 850–3900)
LYMPHOCYTES NFR BLD AUTO: 23.4 %
MCH RBC QN AUTO: 29.7 PG (ref 27–33)
MCHC RBC AUTO-ENTMCNC: 31.4 G/DL (ref 32–36)
MCV RBC AUTO: 94.5 FL (ref 80–100)
MONOCYTES # BLD AUTO: 512 CELLS/UL (ref 200–950)
MONOCYTES NFR BLD AUTO: 6.4 %
NEUTROPHILS # BLD AUTO: 5416 CELLS/UL (ref 1500–7800)
NEUTROPHILS NFR BLD AUTO: 67.7 %
NONHDLC SERPL-MCNC: 128 MG/DL (CALC)
PLATELET # BLD AUTO: 225 THOUSAND/UL (ref 140–400)
PMV BLD REES-ECKER: 10.1 FL (ref 7.5–12.5)
POTASSIUM SERPL-SCNC: 4.5 MMOL/L (ref 3.5–5.3)
PROT SERPL-MCNC: 7.5 G/DL (ref 6.1–8.1)
PSA SERPL-MCNC: 3.24 NG/ML
RBC # BLD AUTO: 5.42 MILLION/UL (ref 4.2–5.8)
SODIUM SERPL-SCNC: 140 MMOL/L (ref 135–146)
TRIGL SERPL-MCNC: 228 MG/DL
WBC # BLD AUTO: 8 THOUSAND/UL (ref 3.8–10.8)

## 2023-11-15 PROCEDURE — 99214 PR OFFICE/OUTPT VISIT, EST, LEVL IV, 30-39 MIN: ICD-10-PCS | Mod: S$GLB,,, | Performed by: INTERNAL MEDICINE

## 2023-11-15 PROCEDURE — 99214 OFFICE O/P EST MOD 30 MIN: CPT | Mod: S$GLB,,, | Performed by: INTERNAL MEDICINE

## 2023-11-15 PROCEDURE — 99999 PR PBB SHADOW E&M-EST. PATIENT-LVL III: ICD-10-PCS | Mod: PBBFAC,,, | Performed by: INTERNAL MEDICINE

## 2023-11-15 PROCEDURE — 99999 PR PBB SHADOW E&M-EST. PATIENT-LVL III: CPT | Mod: PBBFAC,,, | Performed by: INTERNAL MEDICINE

## 2023-11-15 NOTE — PROGRESS NOTES
[facility] Note    Chief Complaint      Chief Complaint   Patient presents with    Follow-up     6 month ck     History of Present Illness      Luís Torres is a 63 y.o. male who presents today for DM2 f/u.  Patient comes to appointment alone.  Ortho: [facility]    S/p right knee replacement 9/13, still going to PT 3 times per week.  No pain from knee, some swelling after therapy.    Problem List Items Addressed This Visit       Hypertension associated with diabetes - Primary    Current Assessment & Plan     BP controlled on lisinopril 20 mg daily, no CP/SOB/HA.  Doesn't add salt.         Hyperlipidemia due to type 2 diabetes mellitus    Current Assessment & Plan     Stable on lipitor 20 mg daily, no myalgias.  The 10-year ASCVD risk score (Jonas SHEEHAN, et al., 2019) is: 20.9%    Values used to calculate the score:      Age: 63 years      Sex: Male      Is Non- : No      Diabetic: Yes      Tobacco smoker: No      Systolic Blood Pressure: 122 mmHg      Is BP treated: Yes      HDL Cholesterol: 43 mg/dL      Total Cholesterol: 171 mg/dL           Type 2 diabetes mellitus with hyperglycemia, without long-term current use of insulin    Current Assessment & Plan     A1C 7.5 on 4/2023 labs, pending now.  Am glucose 120-130's. Stable on metformin 1000 mg ER, no hypoglycemia.  Had diarrhea with instant release dosing.           Class 3 severe obesity due to excess calories with serious comorbidity and body mass index (BMI) of 40.0 to 44.9 in adult    Current Assessment & Plan     Bowls 4 times per week but doesn't formally exercise. Has been exercising, doing PT 3 times per week.         Hypertriglyceridemia    Current Assessment & Plan     TG in 200's on 11/2023 labs.          Health Maintenance   Topic Date Due    Colorectal Cancer Screening  09/01/2021    Hemoglobin A1c  10/20/2023    TETANUS VACCINE  11/15/2024 (Originally 1/28/1978)    Shingles Vaccine (1 of 2) 11/15/2024  (Originally 1/28/2010)    Foot Exam  04/26/2024    Eye Exam  05/19/2024    Low Dose Statin  08/18/2024    PROSTATE-SPECIFIC ANTIGEN  11/13/2024    Lipid Panel  11/13/2024    Hepatitis C Screening  Completed       History reviewed. No pertinent past medical history.    Past Surgical History:   Procedure Laterality Date    ADENOIDECTOMY  1965    EYE SURGERY  05/2020    Cataract removal R eye    TONSILLECTOMY  1965       family history includes Diabetes in his mother; Heart disease in his father and mother; Hypertension in his mother; Vision loss in his mother.    Social History     Tobacco Use    Smoking status: Never    Smokeless tobacco: Never   Substance Use Topics    Alcohol use: Yes     Alcohol/week: 6.0 standard drinks of alcohol     Types: 4 Cans of beer, 2 Shots of liquor per week    Drug use: No       Review of Systems   Constitutional:  Negative for chills and fever.   Respiratory:  Negative for cough and shortness of breath.    Cardiovascular:  Negative for chest pain and palpitations.   Gastrointestinal:  Negative for constipation, diarrhea, nausea and vomiting.   Genitourinary:  Negative for dysuria and hematuria.   Musculoskeletal:  Negative for falls.   Neurological:  Negative for headaches.        Outpatient Encounter Medications as of 11/15/2023   Medication Sig Dispense Refill    atorvastatin (LIPITOR) 20 MG tablet Take 1 tablet (20 mg total) by mouth once daily. 90 tablet 3    blood sugar diagnostic (ONETOUCH VERIO TEST STRIPS) Strp 1 strip by Misc.(Non-Drug; Combo Route) route 2 (two) times a day. 200 each 1    lancets 33 gauge Misc 1 lancet by Misc.(Non-Drug; Combo Route) route 2 (two) times a day. 200 each 1    lisinopriL (PRINIVIL,ZESTRIL) 20 MG tablet Take 1 tablet (20 mg total) by mouth once daily. 90 tablet 3    metFORMIN (GLUCOPHAGE-XR) 500 MG ER 24hr tablet Take 2 tablets (1,000 mg total) by mouth daily with breakfast. 180 tablet 3    tadalafiL (CIALIS) 20 MG Tab TAKE ONE TABLET BY MOUTH  "EVERY DAY AS NEEDED 30 tablet 3     No facility-administered encounter medications on file as of 11/15/2023.        Review of patient's allergies indicates:   Allergen Reactions    No known drug allergies        Physical Exam      Vital Signs  Pulse: 96  SpO2: (!) 67 %  BP: 130/80  BP Location: Right arm  Patient Position: Sitting  Height and Weight  Height: 5' 10" (177.8 cm)  Weight: (!) 136.7 kg (301 lb 5.9 oz)  BSA (Calculated - sq m): 2.6 sq meters  BMI (Calculated): 43.2  Weight in (lb) to have BMI = 25: 173.9]  Body mass index is 43.24 kg/m².    Physical Exam  Constitutional:       Appearance: He is well-developed.   HENT:      Head: Normocephalic and atraumatic.   Neck:      Thyroid: No thyromegaly.   Cardiovascular:      Rate and Rhythm: Normal rate and regular rhythm.      Heart sounds: No murmur heard.  Pulmonary:      Effort: Pulmonary effort is normal. No respiratory distress.      Breath sounds: Normal breath sounds.   Abdominal:      General: There is no distension.      Palpations: Abdomen is soft.      Tenderness: There is no abdominal tenderness.   Skin:     General: Skin is warm and dry.   Neurological:      Mental Status: He is alert and oriented to person, place, and time.   Psychiatric:         Behavior: Behavior normal.          Laboratory:  CBC:  No results for input(s): "WBC", "RBC", "HGB", "HCT", "PLT", "MCV", "MCH", "MCHC" in the last 2160 hours.    CMP:  Recent Labs   Lab Result Units 11/13/23  1421   Glucose mg/dL 176*   Calcium mg/dL 10.0   Albumin g/dL 4.8   Total Protein g/dL 7.5   Sodium mmol/L 140   Potassium mmol/L 4.5   CO2 mmol/L 29   Chloride mmol/L 103   BUN mg/dL 22   ALT U/L 12   AST U/L 12   Total Bilirubin mg/dL 0.8     URINALYSIS:  No results for input(s): "COLORU", "CLARITYU", "SPECGRAV", "PHUR", "PROTEINUA", "GLUCOSEU", "BILIRUBINCON", "BLOODU", "WBCU", "RBCU", "BACTERIA", "MUCUS", "NITRITE", "LEUKOCYTESUR", "UROBILINOGEN", "HYALINECASTS" in the last 2160 hours. " "  LIPIDS:  Recent Labs   Lab Result Units 11/13/23  1421   HDL mg/dL 43   Cholesterol mg/dL 171   Triglycerides mg/dL 228*   LDL Cholesterol mg/dL (calc) 96   HDL/Cholesterol Ratio (calc) 4.0   Non HDL Chol. (LDL+VLDL) mg/dL (calc) 128       TSH:  No results for input(s): "TSH" in the last 2160 hours.  A1C:  No results for input(s): "HGBA1C" in the last 2160 hours.      Radiology:  No results found in the last 30 days.     Assessment/Plan     Luís Torres is a 63 y.o.male with:    1. Hypertension associated with diabetes    2. Hyperlipidemia due to type 2 diabetes mellitus    3. Type 2 diabetes mellitus with hyperglycemia, without long-term current use of insulin    4. Class 3 severe obesity due to excess calories with serious comorbidity and body mass index (BMI) of 40.0 to 44.9 in adult    5. Hypertriglyceridemia     -A1C pending  -counseled on increasing exercise and working on diet  -referred for cscope in [place] last visit  -Continue current medications and maintain follow up with specialists.    -No follow-ups on file.       Kirti Phelan MD  [facility]                          "

## 2023-11-15 NOTE — ASSESSMENT & PLAN NOTE
Stable on lipitor 20 mg daily, no myalgias.  The 10-year ASCVD risk score (Jonas SHEEHAN, et al., 2019) is: 20.9%    Values used to calculate the score:      Age: 63 years      Sex: Male      Is Non- : No      Diabetic: Yes      Tobacco smoker: No      Systolic Blood Pressure: 122 mmHg      Is BP treated: Yes      HDL Cholesterol: 43 mg/dL      Total Cholesterol: 171 mg/dL

## 2023-11-15 NOTE — ASSESSMENT & PLAN NOTE
A1C 7.5 on 4/2023 labs, pending now.  Am glucose 120-130's. Stable on metformin 1000 mg ER, no hypoglycemia.  Had diarrhea with instant release dosing.

## 2023-11-15 NOTE — ASSESSMENT & PLAN NOTE
Bowls 4 times per week but doesn't formally exercise. Has been exercising, doing PT 3 times per week.

## 2023-11-19 DIAGNOSIS — E11.65 TYPE 2 DIABETES MELLITUS WITH HYPERGLYCEMIA, WITHOUT LONG-TERM CURRENT USE OF INSULIN: ICD-10-CM

## 2023-11-19 DIAGNOSIS — E78.5 HYPERLIPIDEMIA DUE TO TYPE 2 DIABETES MELLITUS: ICD-10-CM

## 2023-11-19 DIAGNOSIS — E11.69 HYPERLIPIDEMIA DUE TO TYPE 2 DIABETES MELLITUS: ICD-10-CM

## 2023-11-19 DIAGNOSIS — E11.59 HYPERTENSION ASSOCIATED WITH DIABETES: ICD-10-CM

## 2023-11-19 DIAGNOSIS — I15.2 HYPERTENSION ASSOCIATED WITH DIABETES: ICD-10-CM

## 2023-11-19 NOTE — TELEPHONE ENCOUNTER
No care due was identified.  [facility] Embedded Care Due Messages. Reference number: [id number].   11/19/2023 5:31:56 AM CST

## 2023-11-21 RX ORDER — ATORVASTATIN CALCIUM 20 MG/1
20 TABLET, FILM COATED ORAL
Qty: 90 TABLET | Refills: 3 | Status: SHIPPED | OUTPATIENT
Start: 2023-11-21

## 2023-11-21 RX ORDER — METFORMIN HYDROCHLORIDE 500 MG/1
TABLET, EXTENDED RELEASE ORAL
Qty: 180 TABLET | Refills: 1 | Status: SHIPPED | OUTPATIENT
Start: 2023-11-21

## 2023-11-21 RX ORDER — LISINOPRIL 20 MG/1
20 TABLET ORAL
Qty: 90 TABLET | Refills: 3 | Status: SHIPPED | OUTPATIENT
Start: 2023-11-21

## 2024-01-24 ENCOUNTER — HOSPITAL ENCOUNTER (OUTPATIENT)
Dept: RADIOLOGY | Facility: HOSPITAL | Age: 64
Discharge: HOME | End: 2024-01-24
Attending: ORTHOPAEDIC SURGERY
Payer: COMMERCIAL

## 2024-01-24 ENCOUNTER — OFFICE VISIT (OUTPATIENT)
Dept: SPORTS MEDICINE | Facility: CLINIC | Age: 64
End: 2024-01-24
Payer: COMMERCIAL

## 2024-01-24 VITALS
SYSTOLIC BLOOD PRESSURE: 175 MMHG | DIASTOLIC BLOOD PRESSURE: 85 MMHG | BODY MASS INDEX: 44.03 KG/M2 | WEIGHT: 307.56 LBS | HEIGHT: 70 IN | HEART RATE: 81 BPM

## 2024-01-24 DIAGNOSIS — M25.512 LEFT SHOULDER PAIN, UNSPECIFIED CHRONICITY: Primary | ICD-10-CM

## 2024-01-24 DIAGNOSIS — M25.512 LEFT SHOULDER PAIN, UNSPECIFIED CHRONICITY: ICD-10-CM

## 2024-01-24 PROCEDURE — 73030 X-RAY EXAM OF SHOULDER: CPT | Mod: TC,LT

## 2024-01-24 PROCEDURE — 99999 PR PBB SHADOW E&M-EST. PATIENT-LVL III: CPT | Mod: PBBFAC,,, | Performed by: ORTHOPAEDIC SURGERY

## 2024-01-24 PROCEDURE — 73030 X-RAY EXAM OF SHOULDER: CPT | Mod: 26,LT,, | Performed by: RADIOLOGY

## 2024-01-24 PROCEDURE — 99204 OFFICE O/P NEW MOD 45 MIN: CPT | Mod: S$GLB,,, | Performed by: ORTHOPAEDIC SURGERY

## 2024-01-24 NOTE — PROGRESS NOTES
CC: left shoulder pain     63 y.o. Male with left shoulder pain and DM reports that the pain is severe and not responding to any conservative care.      He reports that the pain is worse with overhead activity. It also bothers him at night, but has gotten better since PT. Pain has been presetn for the past 10 years and worsened over the past 5. He has a shoulder dislocation in childhood.     Retired from maintenance     Is affecting ADLs.     SANE: 50  VAS 10    Review of Systems   Constitution: Negative. Negative for chills, fever and night sweats.   HENT: Negative for congestion and headaches.    Eyes: Negative for blurred vision, left vision loss and right vision loss.   Cardiovascular: Negative for chest pain and syncope.   Respiratory: Negative for cough and shortness of breath.    Endocrine: Negative for polydipsia, polyphagia and polyuria.   Hematologic/Lymphatic: Negative for bleeding problem. Does not bruise/bleed easily.   Skin: Negative for dry skin, itching and rash.   Musculoskeletal: Negative for falls and muscle weakness.   Gastrointestinal: Negative for abdominal pain and bowel incontinence.   Genitourinary: Negative for bladder incontinence and nocturia.   Neurological: Negative for disturbances in coordination, loss of balance and seizures.   Psychiatric/Behavioral: Negative for depression. The patient does not have insomnia.    Allergic/Immunologic: Negative for hives and persistent infections.     PAST MEDICAL HISTORY: History reviewed. No pertinent past medical history.  PAST SURGICAL HISTORY:   Past Surgical History:   Procedure Laterality Date    ADENOIDECTOMY  1965    EYE SURGERY  05/2020    Cataract removal R eye    TONSILLECTOMY  1965     FAMILY HISTORY:   Family History   Problem Relation Age of Onset    Diabetes Mother     Heart disease Mother     Hypertension Mother     Vision loss Mother     Heart disease Father      SOCIAL HISTORY:   Social History     Socioeconomic History     Marital status:    Occupational History    Occupation: maintance supervisor   Tobacco Use    Smoking status: Never    Smokeless tobacco: Never   Substance and Sexual Activity    Alcohol use: Yes     Alcohol/week: 6.0 standard drinks of alcohol     Types: 4 Cans of beer, 2 Shots of liquor per week    Drug use: No    Sexual activity: Yes     Partners: Female     Birth control/protection: None     Social Determinants of Health     Financial Resource Strain: Patient Declined (1/23/2024)    Overall Financial Resource Strain (CARDIA)     Difficulty of Paying Living Expenses: Patient declined   Food Insecurity: Patient Declined (1/23/2024)    Hunger Vital Sign     Worried About Running Out of Food in the Last Year: Patient declined     Ran Out of Food in the Last Year: Patient declined   Transportation Needs: Patient Declined (1/23/2024)    PRAPARE - Transportation     Lack of Transportation (Medical): Patient declined     Lack of Transportation (Non-Medical): Patient declined   Physical Activity: Unknown (1/23/2024)    Exercise Vital Sign     Days of Exercise per Week: 2 days   Stress: No Stress Concern Present (1/23/2024)    [place] [employer] - Occupational Stress Questionnaire     Feeling of Stress : Not at all   Social Connections: Unknown (1/23/2024)    Social Connection and Isolation Panel [NHANES]     Frequency of Communication with Friends and Family: Patient declined     Frequency of Social Gatherings with Friends and Family: Patient declined     Active Member of Clubs or Organizations: Patient declined     Attends Club or Organization Meetings: Patient declined     Marital Status:    Housing Stability: Patient Declined (1/23/2024)    Housing Stability Vital Sign     Unable to Pay for Housing in the Last Year: Patient declined     Unstable Housing in the Last Year: Patient declined       MEDICATIONS:   Current Outpatient Medications:     atorvastatin (LIPITOR) 20 MG tablet,  "TAKE 1 TABLET(20 MG) BY MOUTH EVERY DAY, Disp: 90 tablet, Rfl: 3    blood sugar diagnostic (ONETOUCH VERIO TEST STRIPS) Strp, 1 strip by Misc.(Non-Drug; Combo Route) route 2 (two) times a day., Disp: 200 each, Rfl: 1    lancets 33 gauge Misc, 1 lancet by Misc.(Non-Drug; Combo Route) route 2 (two) times a day., Disp: 200 each, Rfl: 1    lisinopriL (PRINIVIL,ZESTRIL) 20 MG tablet, TAKE 1 TABLET(20 MG) BY MOUTH EVERY DAY, Disp: 90 tablet, Rfl: 3    metFORMIN (GLUCOPHAGE-XR) 500 MG ER 24hr tablet, TAKE 2 TABLETS(1000 MG) BY MOUTH DAILY WITH BREAKFAST, Disp: 180 tablet, Rfl: 1    tadalafiL (CIALIS) 20 MG Tab, TAKE ONE TABLET BY MOUTH EVERY DAY AS NEEDED, Disp: 30 tablet, Rfl: 3  ALLERGIES:   Review of patient's allergies indicates:   Allergen Reactions    No known drug allergies        VITAL SIGNS: BP (!) 175/85   Pulse 81   Ht 5' 10" (1.778 m)   Wt (!) 139.5 kg (307 lb 8.7 oz)   BMI 44.13 kg/m²      PHYSICAL EXAMINATION:  General:  The patient is alert and oriented x 3.  Mood is pleasant.  Observation of ears, eyes and nose reveal no gross abnormalities.  No labored breathing observed.  Gait is coordinated. Patient can toe walk and heel walk without difficulty.      left SHOULDER / UPPER EXTREMITY EXAM    OBSERVATION:     Swelling  none  Deformity  none   Discoloration  none   Scapular winging none   Scars   none  Atrophy  none    TENDERNESS / CREPITUS (T/C):          T/C      T/C   Clavicle   -/-  SUPRAspinatus    -/-     AC Jt.    -/-  INFRAspinatus  -/-    SC Jt.    -/-  Deltoid    -/-      G. Tuberosity  -/-  LH BICEP groove  -/-   Acromion:  -/-  Midline Neck   -/-     Scapular Spine -/-  Trapezium   -/-   SMA Scapula  -/-  GH jt. line - post  -/-     Scapulothoracic  -/-         ROM: (* = with pain)  Right shoulder   Left shoulder        AROM (PROM)   AROM (PROM)   FE    170° (175°)     130° (130°)     ER at 0°    60°  (65°)    -10°  (0°)   ER at 90° ABD  90°  (90°)    40°  (40°)   IR at 90°  ABD   NA  (40°) "     NA  (40°)      IR (spine level)   T10     L1    STRENGTH: (* = with pain) RIGHT SHOULDER  LEFT SHOULDER   SCAPTION at 0  5/5    4+/5   SCAPTION at 30  5/5    5/5    IR    5/5    5/5   ER    5/5    5/5   BICEPS   5/5    5/5   Deltoid    5/5    5/5     SIGNS:  Painful side       NEER   + (mild)    OSTEVENS  -    CASE   + (mild)   SPEEDS  neg     DROP ARM   neg   BELLY PRESS neg   Superior escape none    LIFT-OFF  neg   X-Body ADD    neg    MOVING VALGUS neg        STABILITY TESTING    RIGHT SHOULDER   LEFT SHOULDER     Translation     Anterior  up face     up face    Posterior  up face    up face    Sulcus   < 10mm    < 10 mm     Signs   Apprehension   neg      neg       Relocation   no change     no change      Jerk test  neg     neg    EXTREMITY NEURO-VASCULAR EXAM    Sensation grossly intact to light touch all dermatomal regions.    DTR 2+ Biceps, Triceps, BR and Negative Elijahs sign   Grossly intact motor function at Elbow, Wrist and Hand   Distal pulses radial and ulnar 2+, brisk cap refill, symmetric.      NECK:  Painless FROM and spinous processes non-tender. Negative Spurlings sign.      OTHER FINDINGS:      XRAYS reviewed and interpreted personally by me:  Shoulder trauma series left,  were obtained and reviewed  No convincing fracture or dislocation is noted. The osseous structures appear well mineralized and well aligned. There are advanced degenerative changes in the glenohumeral joint and a large inferior osteophyte           ASSESSMENT:   left:  1.  Shoulder glenohumeral osteoarthritis     PLAN:    He should continue PT with [facility] since his symptoms are improving   We discussed that his symptoms with return and may worsen in the future at which time we could consider further treatments which may include CSI or arthroplasty  He will follow up with us as needed     All questions were answered, pt will contact us for questions or concerns in the interim.    I made the decision to obtain  old records of the patient including previous notes and imaging. New imaging was ordered today of the extremity or extremities evaluated. I independently reviewed and interpreted the radiographs and/or MRIs today as well as prior imaging.

## 2024-05-24 DIAGNOSIS — E11.65 TYPE 2 DIABETES MELLITUS WITH HYPERGLYCEMIA, WITHOUT LONG-TERM CURRENT USE OF INSULIN: ICD-10-CM

## 2024-05-24 RX ORDER — METFORMIN HYDROCHLORIDE 500 MG/1
500 TABLET, EXTENDED RELEASE ORAL 2 TIMES DAILY WITH MEALS
Qty: 180 TABLET | Refills: 0 | Status: SHIPPED | OUTPATIENT
Start: 2024-05-24 | End: 2024-05-29

## 2024-05-24 NOTE — TELEPHONE ENCOUNTER
----- Message from Genevieve Ramos sent at 5/24/2024  2:32 PM CDT -----  Contact: Pt [phone]  Prescription refill request.  RX name and strength (copy/paste from chart):    metFORMIN (GLUCOPHAGE-XR) 500 MG ER 24hr tablet    Directions (copy/paste from chart):    Is this a 30 day or 90 day RX:  180  Local pharmacy or mail order pharmacy:  local  Pharmacy name and phone # (copy/paste from chart):       [id number] - [place] [address] AT [address]  [place] [address]  Phone: [phone] Fax: [phone]    Additional information:       The pt is needing refills put in. He only has two days left and is needing a refill.

## 2024-05-24 NOTE — TELEPHONE ENCOUNTER
No care due was identified.  [facility] Embedded Care Due Messages. Reference number: [id number].   5/24/2024 2:39:44 PM CDT

## 2024-05-24 NOTE — TELEPHONE ENCOUNTER
Care Due:                  Date            Visit Type   Department     Provider  --------------------------------------------------------------------------------                                EP -                              PRIMARY      [facility]  Last Visit: 11-      CARE ([facility])   CARE           Kirti Phelan                              EP -                              PRIMARY      [facility] PRIMARY  Next Visit: 05-      CARE ([facility])   CARE           Kirti Phelan                                                            Last  Test          Frequency    Reason                     Performed    Due Date  --------------------------------------------------------------------------------    HBA1C.......  6 months...  metFORMIN................  11-   05-    [employer] Embedded Care Due Messages. Reference number: [id number].   5/24/2024 2:39:13 PM CDT

## 2024-05-27 RX ORDER — METFORMIN HYDROCHLORIDE 500 MG/1
1000 TABLET, EXTENDED RELEASE ORAL DAILY
Qty: 180 TABLET | Refills: 3 | Status: SHIPPED | OUTPATIENT
Start: 2024-05-27

## 2024-05-29 ENCOUNTER — OFFICE VISIT (OUTPATIENT)
Dept: PRIMARY CARE CLINIC | Facility: CLINIC | Age: 64
End: 2024-05-29
Payer: COMMERCIAL

## 2024-05-29 ENCOUNTER — PATIENT OUTREACH (OUTPATIENT)
Dept: ADMINISTRATIVE | Facility: HOSPITAL | Age: 64
End: 2024-05-29
Payer: COMMERCIAL

## 2024-05-29 VITALS
HEART RATE: 63 BPM | DIASTOLIC BLOOD PRESSURE: 80 MMHG | HEIGHT: 70 IN | WEIGHT: 308.44 LBS | SYSTOLIC BLOOD PRESSURE: 134 MMHG | OXYGEN SATURATION: 96 % | BODY MASS INDEX: 44.16 KG/M2

## 2024-05-29 DIAGNOSIS — Z12.12 ENCOUNTER FOR COLORECTAL CANCER SCREENING: ICD-10-CM

## 2024-05-29 DIAGNOSIS — E66.01 CLASS 3 SEVERE OBESITY DUE TO EXCESS CALORIES WITH SERIOUS COMORBIDITY AND BODY MASS INDEX (BMI) OF 40.0 TO 44.9 IN ADULT: ICD-10-CM

## 2024-05-29 DIAGNOSIS — E11.9 TYPE 2 DIABETES MELLITUS WITHOUT COMPLICATION, UNSPECIFIED WHETHER LONG TERM INSULIN USE: ICD-10-CM

## 2024-05-29 DIAGNOSIS — I15.2 HYPERTENSION ASSOCIATED WITH DIABETES: ICD-10-CM

## 2024-05-29 DIAGNOSIS — E11.65 TYPE 2 DIABETES MELLITUS WITH HYPERGLYCEMIA, WITHOUT LONG-TERM CURRENT USE OF INSULIN: Primary | ICD-10-CM

## 2024-05-29 DIAGNOSIS — E78.5 HYPERLIPIDEMIA DUE TO TYPE 2 DIABETES MELLITUS: ICD-10-CM

## 2024-05-29 DIAGNOSIS — Z00.00 ANNUAL PHYSICAL EXAM: ICD-10-CM

## 2024-05-29 DIAGNOSIS — E11.59 HYPERTENSION ASSOCIATED WITH DIABETES: ICD-10-CM

## 2024-05-29 DIAGNOSIS — E11.69 HYPERLIPIDEMIA DUE TO TYPE 2 DIABETES MELLITUS: ICD-10-CM

## 2024-05-29 DIAGNOSIS — E78.1 HYPERTRIGLYCERIDEMIA: ICD-10-CM

## 2024-05-29 DIAGNOSIS — Z12.5 SCREENING PSA (PROSTATE SPECIFIC ANTIGEN): ICD-10-CM

## 2024-05-29 DIAGNOSIS — Z12.11 ENCOUNTER FOR COLORECTAL CANCER SCREENING: ICD-10-CM

## 2024-05-29 DIAGNOSIS — N52.9 ERECTILE DYSFUNCTION, UNSPECIFIED ERECTILE DYSFUNCTION TYPE: ICD-10-CM

## 2024-05-29 PROBLEM — M25.512 LEFT SHOULDER PAIN: Status: RESOLVED | Noted: 2024-01-24 | Resolved: 2024-05-29

## 2024-05-29 PROCEDURE — 99999 PR PBB SHADOW E&M-EST. PATIENT-LVL III: CPT | Mod: PBBFAC,,, | Performed by: INTERNAL MEDICINE

## 2024-05-29 PROCEDURE — 99214 OFFICE O/P EST MOD 30 MIN: CPT | Mod: S$GLB,,, | Performed by: INTERNAL MEDICINE

## 2024-05-29 RX ORDER — TADALAFIL 20 MG/1
20 TABLET ORAL DAILY PRN
Qty: 30 TABLET | Refills: 3 | Status: SHIPPED | OUTPATIENT
Start: 2024-05-29

## 2024-05-29 NOTE — PROGRESS NOTES
Health Maintenance Due   Topic Date Due    RSV Vaccine (Age 60+ and Pregnant patients) (1 - 1-dose 60+ series) Never done    Colorectal Cancer Screening  09/01/2021    Eye Exam  05/19/2024     Chart review completed. HM Updated. Triggered Links. Immunizations reviewed and updated. Care Everywhere Updated. Care Team Updated.  Requested eye exam records from Dr. Tran at [facility].

## 2024-05-29 NOTE — LETTER
AUTHORIZATION FOR RELEASE OF   CONFIDENTIAL INFORMATION    Dear Dr. Tran,    We are seeing Luís Torres, date of birth 1960, in the clinic at [facility]. Kirti Phelan MD is the patient's PCP. Luís Torres has an outstanding lab/procedure at the time we reviewed his chart. In order to help keep his health information updated, he has authorized us to request the following medical record(s):        (  )  MAMMOGRAM                                      (  )  COLONOSCOPY      (  )  PAP SMEAR                                          (  )  OUTSIDE LAB RESULTS     (  )  DEXA SCAN                                          (X)  DIABETIC EYE EXAM            (  )  FOOT EXAM                                          (  )  ENTIRE RECORD     (  )  OUTSIDE IMMUNIZATIONS                 (  )  _______________         Please fax records to [facility], Jordan, Jenna C., MD, [phone].     If you have any questions, please contact Ya at [phone].           Patient Name: Luís Torres  : 1960  Patient Phone #: [phone]

## 2024-05-29 NOTE — PROGRESS NOTES
"[facility] Note    Chief Complaint      Chief Complaint   Patient presents with    Follow-up     History of Present Illness      Luís Torres is a 64 y.o. male who presents today for DM2 f/u.  Patient comes to appointment alone.  Ortho: [facility]    S/p right knee replacement 9/13, no pain but still feels "weird". Still doing home exercises.  Planning for left knee replacement with Dr. Núñez in 9/2024.    Problem List Items Addressed This Visit       Class 3 severe obesity due to excess calories with serious comorbidity and body mass index (BMI) of 40.0 to 44.9 in adult    Current Assessment & Plan     Bowls 3 times per week but doesn't formally exercise. Gained 6 pounds since last visit.         Erectile dysfunction    Current Assessment & Plan     Stable on cialis PRN.         Hyperlipidemia due to type 2 diabetes mellitus    Hypertension associated with diabetes    Current Assessment & Plan     BP controlled on lisinopril 20 mg daily, no CP/SOB/HA.  Doesn't add salt.         Hypertriglyceridemia    Current Assessment & Plan     TG in 200's on 11/2023 labs.         Type 2 diabetes mellitus with hyperglycemia, without long-term current use of insulin - Primary    Current Assessment & Plan     A1C 10 in 5/2024.  Had knee replaced and diet has been poor since.  Just came back from cruise. Am glucose 120-130's. Stable on metformin 1000 mg ER, no hypoglycemia.  Had diarrhea with instant release dosing.           Relevant Orders    Hemoglobin A1C    Microalbumin/Creatinine Ratio, Urine    Hemoglobin A1C     Other Visit Diagnoses       Annual physical exam        Relevant Orders    CBC Auto Differential    Comprehensive Metabolic Panel    Lipid Panel    Screening PSA (prostate specific antigen)        Relevant Orders    PSA, SCREENING    Encounter for colorectal cancer screening        Relevant Orders    Ambulatory referral/consult to Endo Procedure             Health Maintenance   Topic Date " Due    Colorectal Cancer Screening  09/01/2021    Eye Exam  05/19/2024    TETANUS VACCINE  11/15/2024 (Originally 1/28/1978)    Shingles Vaccine (1 of 2) 11/15/2024 (Originally 1/28/2010)    Hemoglobin A1c  08/24/2024    PROSTATE-SPECIFIC ANTIGEN  11/13/2024    Low Dose Statin  01/24/2025    Lipid Panel  05/24/2025    Foot Exam  05/29/2025    Hepatitis C Screening  Completed       History reviewed. No pertinent past medical history.    Past Surgical History:   Procedure Laterality Date    ADENOIDECTOMY  1965    EYE SURGERY  05/2020    Cataract removal R eye    TONSILLECTOMY  1965       family history includes Diabetes in his mother; Heart disease in his father and mother; Hypertension in his mother; Vision loss in his mother.    Social History     Tobacco Use    Smoking status: Never    Smokeless tobacco: Never   Substance Use Topics    Alcohol use: Yes     Alcohol/week: 6.0 standard drinks of alcohol     Types: 4 Cans of beer, 2 Shots of liquor per week    Drug use: No       Review of Systems   Constitutional:  Negative for chills and fever.   Respiratory:  Negative for cough and shortness of breath.    Cardiovascular:  Negative for chest pain and palpitations.   Gastrointestinal:  Negative for constipation, diarrhea, nausea and vomiting.   Genitourinary:  Negative for dysuria and hematuria.   Musculoskeletal:  Negative for falls.   Neurological:  Negative for headaches.        Outpatient Encounter Medications as of 5/29/2024   Medication Sig Dispense Refill    atorvastatin (LIPITOR) 20 MG tablet TAKE 1 TABLET(20 MG) BY MOUTH EVERY DAY 90 tablet 3    blood sugar diagnostic (ONETOUCH VERIO TEST STRIPS) Strp 1 strip by Misc.(Non-Drug; Combo Route) route 2 (two) times a day. 200 each 1    lancets 33 gauge Misc 1 lancet by Misc.(Non-Drug; Combo Route) route 2 (two) times a day. 200 each 1    lisinopriL (PRINIVIL,ZESTRIL) 20 MG tablet TAKE 1 TABLET(20 MG) BY MOUTH EVERY DAY 90 tablet 3    metFORMIN (GLUCOPHAGE-XR) 500  "MG ER 24hr tablet Take 2 tablets (1,000 mg total) by mouth once daily. 180 tablet 3    [DISCONTINUED] metFORMIN (GLUCOPHAGE-XR) 500 MG ER 24hr tablet Take 1 tablet (500 mg total) by mouth 2 (two) times daily with meals. 180 tablet 0    [DISCONTINUED] tadalafiL (CIALIS) 20 MG Tab TAKE ONE TABLET BY MOUTH EVERY DAY AS NEEDED 30 tablet 3    tadalafiL (CIALIS) 20 MG Tab Take 1 tablet (20 mg total) by mouth daily as needed. 30 tablet 3    [DISCONTINUED] metFORMIN (GLUCOPHAGE-XR) 500 MG ER 24hr tablet TAKE 2 TABLETS(1000 MG) BY MOUTH DAILY WITH BREAKFAST 180 tablet 1     No facility-administered encounter medications on file as of 5/29/2024.        Review of patient's allergies indicates:   Allergen Reactions    No known drug allergies        Physical Exam      Vital Signs  Pulse: 63  SpO2: 96 %  BP: 134/80  Pain Score: 0-No pain  Height and Weight  Height: 5' 10" (177.8 cm)  Weight: (!) 139.9 kg (308 lb 6.8 oz)  BSA (Calculated - sq m): 2.63 sq meters  BMI (Calculated): 44.3  Weight in (lb) to have BMI = 25: 173.9]  Body mass index is 44.25 kg/m².    Physical Exam  Constitutional:       Appearance: He is well-developed.   HENT:      Head: Normocephalic and atraumatic.   Neck:      Thyroid: No thyromegaly.   Cardiovascular:      Rate and Rhythm: Normal rate and regular rhythm.      Heart sounds: No murmur heard.  Pulmonary:      Effort: Pulmonary effort is normal. No respiratory distress.      Breath sounds: Normal breath sounds.   Abdominal:      General: There is no distension.      Palpations: Abdomen is soft.      Tenderness: There is no abdominal tenderness.   Skin:     General: Skin is warm and dry.   Neurological:      Mental Status: He is alert and oriented to person, place, and time.   Psychiatric:         Behavior: Behavior normal.          Laboratory:  CBC:  Recent Labs   Lab Result Units 05/24/24  0816   WBC Thousand/uL 4.9   RBC Million/uL 5.00   Hemoglobin g/dL 15.7   Hematocrit % 46.7   Platelets " "Thousand/uL 168   MCV fL 93.4   MCH pg 31.4   MCHC g/dL 33.6       CMP:  Recent Labs   Lab Result Units 05/24/24  0816   Glucose mg/dL 214*   Calcium mg/dL 9.3   Albumin g/dL 4.4   Total Protein g/dL 6.8   Sodium mmol/L 137   Potassium mmol/L 4.4   CO2 mmol/L 26   Chloride mmol/L 101   BUN mg/dL 27*   ALT U/L 16   AST U/L 16   Total Bilirubin mg/dL 1.0     URINALYSIS:  No results for input(s): "COLORU", "CLARITYU", "SPECGRAV", "PHUR", "PROTEINUA", "GLUCOSEU", "BILIRUBINCON", "BLOODU", "WBCU", "RBCU", "BACTERIA", "MUCUS", "NITRITE", "LEUKOCYTESUR", "UROBILINOGEN", "HYALINECASTS" in the last 2160 hours.   LIPIDS:  Recent Labs   Lab Result Units 05/24/24  0816   HDL mg/dL 32*   Cholesterol mg/dL 178   Triglycerides mg/dL 481*   HDL/Cholesterol Ratio (calc) 5.6*   Non HDL Chol. (LDL+VLDL) mg/dL (calc) 146*       TSH:  No results for input(s): "TSH" in the last 2160 hours.  A1C:  Recent Labs   Lab Result Units 05/24/24  0816   Hemoglobin A1C % of total Hgb 10.0*         Radiology:  No results found in the last 30 days.     Assessment/Plan     Luís Torres is a 64 y.o.male with:    1. Type 2 diabetes mellitus with hyperglycemia, without long-term current use of insulin  - Hemoglobin A1C; Future  - Microalbumin/Creatinine Ratio, Urine; Future  - Hemoglobin A1C; Future  - Hemoglobin A1C  - Microalbumin/Creatinine Ratio, Urine  - Hemoglobin A1C    2. Hyperlipidemia due to type 2 diabetes mellitus    3. Hypertension associated with diabetes    4. Class 3 severe obesity due to excess calories with serious comorbidity and body mass index (BMI) of 40.0 to 44.9 in adult    5. Hypertriglyceridemia    6. Erectile dysfunction, unspecified erectile dysfunction type    7. Annual physical exam  - CBC Auto Differential; Future  - Comprehensive Metabolic Panel; Future  - Lipid Panel; Future  - CBC Auto Differential  - Comprehensive Metabolic Panel  - Lipid Panel    8. Screening PSA (prostate specific antigen)  - PSA, SCREENING; " Future  - PSA, SCREENING    9. Encounter for colorectal cancer screening  - Ambulatory referral/consult to Endo Procedure ; Future        -get eye exam from Dr. Suresh  -referred for cscope at [place]  -Continue current medications and maintain follow up with specialists.    -Follow up in about 6 months (around 11/29/2024) for follow up of medical problems.       Kirti Phelan MD  [facility]

## 2024-05-29 NOTE — ASSESSMENT & PLAN NOTE
A1C 10 in 5/2024.  Had knee replaced and diet has been poor since.  Just came back from cruise. Am glucose 120-130's. Stable on metformin 1000 mg ER, no hypoglycemia.  Had diarrhea with instant release dosing.

## 2024-06-26 ENCOUNTER — PATIENT OUTREACH (OUTPATIENT)
Dept: ADMINISTRATIVE | Facility: HOSPITAL | Age: 64
End: 2024-06-26
Payer: COMMERCIAL

## 2024-06-26 NOTE — PROGRESS NOTES
Health Maintenance Due   Topic Date Due    RSV Vaccine (Age 60+ and Pregnant patients) (1 - 1-dose 60+ series) Never done    Colorectal Cancer Screening  09/01/2021    Eye Exam  05/19/2024     Chart review completed. HM Updated. Triggered Links. Immunizations reviewed and updated. Care Everywhere Updated. Care Team Updated.  Imported surgery clearance form from [facility] into [facility].

## 2024-07-22 ENCOUNTER — PATIENT MESSAGE (OUTPATIENT)
Dept: PRIMARY CARE CLINIC | Facility: CLINIC | Age: 64
End: 2024-07-22
Payer: COMMERCIAL

## 2024-07-22 DIAGNOSIS — E78.5 HYPERLIPIDEMIA DUE TO TYPE 2 DIABETES MELLITUS: ICD-10-CM

## 2024-07-22 DIAGNOSIS — E11.59 HYPERTENSION ASSOCIATED WITH DIABETES: ICD-10-CM

## 2024-07-22 DIAGNOSIS — I15.2 HYPERTENSION ASSOCIATED WITH DIABETES: ICD-10-CM

## 2024-07-22 DIAGNOSIS — E11.65 TYPE 2 DIABETES MELLITUS WITH HYPERGLYCEMIA, WITHOUT LONG-TERM CURRENT USE OF INSULIN: ICD-10-CM

## 2024-07-22 DIAGNOSIS — E11.69 HYPERLIPIDEMIA DUE TO TYPE 2 DIABETES MELLITUS: ICD-10-CM

## 2024-07-23 RX ORDER — LISINOPRIL 20 MG/1
20 TABLET ORAL DAILY
Qty: 90 TABLET | Refills: 0 | Status: SHIPPED | OUTPATIENT
Start: 2024-07-23

## 2024-07-23 RX ORDER — METFORMIN HYDROCHLORIDE 500 MG/1
1000 TABLET, EXTENDED RELEASE ORAL DAILY
Qty: 180 TABLET | Refills: 0 | Status: SHIPPED | OUTPATIENT
Start: 2024-07-23

## 2024-07-23 RX ORDER — ATORVASTATIN CALCIUM 20 MG/1
20 TABLET, FILM COATED ORAL DAILY
Qty: 90 TABLET | Refills: 0 | Status: SHIPPED | OUTPATIENT
Start: 2024-07-23

## 2024-07-23 NOTE — TELEPHONE ENCOUNTER
No care due was identified.  [facility] Embedded Care Due Messages. Reference number: [id number].   7/23/2024 7:56:34 AM CDT

## 2024-07-30 ENCOUNTER — CLINICAL SUPPORT (OUTPATIENT)
Dept: ENDOSCOPY | Facility: HOSPITAL | Age: 64
End: 2024-07-30
Attending: INTERNAL MEDICINE
Payer: COMMERCIAL

## 2024-07-30 ENCOUNTER — TELEPHONE (OUTPATIENT)
Dept: ENDOSCOPY | Facility: HOSPITAL | Age: 64
End: 2024-07-30

## 2024-07-30 VITALS — WEIGHT: 306.44 LBS | BODY MASS INDEX: 43.87 KG/M2 | HEIGHT: 70 IN

## 2024-07-30 DIAGNOSIS — Z12.11 ENCOUNTER FOR COLORECTAL CANCER SCREENING: ICD-10-CM

## 2024-07-30 DIAGNOSIS — Z12.12 ENCOUNTER FOR COLORECTAL CANCER SCREENING: ICD-10-CM

## 2024-07-30 RX ORDER — POLYETHYLENE GLYCOL 3350, SODIUM SULFATE ANHYDROUS, SODIUM BICARBONATE, SODIUM CHLORIDE, POTASSIUM CHLORIDE 236; 22.74; 6.74; 5.86; 2.97 G/4L; G/4L; G/4L; G/4L; G/4L
4 POWDER, FOR SOLUTION ORAL ONCE
Qty: 4000 ML | Refills: 0 | Status: SHIPPED | OUTPATIENT
Start: 2024-07-30 | End: 2024-07-30

## 2024-07-30 NOTE — TELEPHONE ENCOUNTER
CC:    Chief Complaint   Patient presents with   • UTI   • Vaginal Prolapse          Refills needed today?  No    PMD - Ute Dejesus MD    Patient would like communication of their results via:    Home Phone: [phone] (home)  Okay to leave a message containing results? Yes         Spoke to patient to schedule procedure(s) Colonoscopy       Physician to perform procedure(s) Dr. SAIGE Prieto  Date of Procedure (s) 8/30/24  Arrival Time 12:45 PM  Time of Procedure(s) 1:45 PM   Location of Procedure(s) [place] [address]nd Floor  Type of Rx Prep sent to patient: PEG  Instructions provided to patient via [facility]    Patient was informed on the following information and verbalized understanding. Screening questionnaire reviewed with patient and complete. If procedure requires anesthesia, a responsible adult needs to be present to accompany the patient home, patient cannot drive after receiving anesthesia. Appointment details are tentative, especially check-in time. Patient will receive a prep-op call 7 days prior to confirm check-in time for procedure. If applicable the patient should contact their pharmacy to verify Rx for procedure prep is ready for pick-up. Patient was advised to call the scheduling department at [phone] if pharmacy states no Rx is available. Patient was advised to call the endoscopy scheduling department if any questions or concerns arise.      [facility] Scheduling Department

## 2024-08-21 ENCOUNTER — TELEPHONE (OUTPATIENT)
Dept: ENDOSCOPY | Facility: HOSPITAL | Age: 64
End: 2024-08-21
Payer: COMMERCIAL

## 2024-08-21 NOTE — TELEPHONE ENCOUNTER
Spoke to patient for pre-call to confirm scheduled Colonoscopy and patient verbalized understanding of the following:       Date & arrival time of procedure(s) verified 8/30/24, 12:45 PM.  Location of procedure(s) [facility] 2nd Floor verified.  NPO status reinforced. Ok to continue clear liquids until 9:45 AM.   Patient denies use of blood thinners, GLP-1 medications, and weight loss medications.  Patient confirmed receipt of prep instructions and Rx prep.  Instructions provided to patient via [facility].  Patient confirmed ride home after procedure if procedure requires anesthesia.   Pre-call screening questionnaire reviewed and completed with patient.   Appointment details are tentative, including check-in time.  If the patient begins taking any blood thinning medications, injectable weight loss/diabetes medications (other than insulin), or Adipex (phentermine) patient was instructed to contact the endoscopy scheduling department as soon as possible.  Patient was advised to call the endoscopy scheduling department if any questions or concerns arise.      Endoscopy Scheduling Department

## 2024-08-21 NOTE — TELEPHONE ENCOUNTER
Left voicemail and sent portal message for patient to call Endoscopy Scheduling to review instructions and confirm appointment for Colonoscopy on 8/30/24.

## 2024-08-22 ENCOUNTER — ANESTHESIA EVENT (OUTPATIENT)
Dept: ENDOSCOPY | Facility: HOSPITAL | Age: 64
End: 2024-08-22
Payer: COMMERCIAL

## 2024-08-22 NOTE — ANESTHESIA PREPROCEDURE EVALUATION
08/22/2024  Luís Torres is a 64 y.o., male.    [facility]  Anesthesia Pre-Operative Evaluation       Patient Name: Luís Torres  YOB: 1960  MRN: [id number]  CSN: [id number]      Code Status: No Order   Date of Procedure: 8/30/2024  Anesthesia: Choice Procedure: Procedure(s) (LRB):  COLONOSCOPY (N/A)  Pre-Operative Diagnosis: Encounter for colorectal cancer screening [Z12.11, Z12.12]  Proceduralist: Surgeons and Role:     * Chris Prieto MD - Primary Nurse: (Unknown)      SUBJECTIVE:   Luís Torres is a 64 y.o. male who  has no past medical history on file..     he has a current medication list which includes the following long-term medication(s): atorvastatin, lancets, lisinopril, metformin, and tadalafil.     ALLERGIES:     Review of patient's allergies indicates:   Allergen Reactions    No known drug allergies      LDA:          Lines/Drains/Airways       None                  Anesthesia Evaluation      Airway   Dental      Pulmonary    Cardiovascular   (+) hypertension    ECG reviewed    Neuro/Psych      GI/Hepatic/Renal      Endo/Other    (+) diabetes mellitus type 2  Abdominal                     MEDICATIONS:     Antibiotics (From admission, onward)      None          VTE Risk Mitigation (From admission, onward)      None              No current facility-administered medications for this encounter.     Current Outpatient Medications   Medication Sig Dispense Refill    atorvastatin (LIPITOR) 20 MG tablet Take 1 tablet (20 mg total) by mouth once daily. 90 tablet 0    blood sugar diagnostic (ONETOUCH VERIO TEST STRIPS) Strp 1 strip by Misc.(Non-Drug; Combo Route) route 2 (two) times a day. 200 each 1    lancets 33 gauge Misc 1 lancet by Misc.(Non-Drug; Combo Route) route 2 (two) times a day. 200 each 1    lisinopriL (PRINIVIL,ZESTRIL) 20 MG tablet Take 1 tablet  (20 mg total) by mouth once daily. 90 tablet 0    metFORMIN (GLUCOPHAGE-XR) 500 MG ER 24hr tablet Take 2 tablets (1,000 mg total) by mouth once daily. 180 tablet 0    tadalafiL (CIALIS) 20 MG Tab Take 1 tablet (20 mg total) by mouth daily as needed. 30 tablet 3          History:   There are no hospital problems to display for this patient.    Surgical History:    has a past surgical history that includes Adenoidectomy (1965); Eye surgery (05/2020); and Tonsillectomy (1965).   Social History:    reports being sexually active and has had partner(s) who are female. He reports using the following method of birth control/protection: None.  reports that he has never smoked. He has never used smokeless tobacco. He reports current alcohol use of about 6.0 standard drinks of alcohol per week. He reports that he does not use drugs.     OBJECTIVE:     Vital Signs (Most Recent):    Vital Signs Range (Last 24H):          There is no height or weight on file to calculate BMI.   Wt Readings from Last 4 Encounters:   07/30/24 (!) 139 kg (306 lb 7.4 oz)   05/29/24 (!) 139.9 kg (308 lb 6.8 oz)   01/24/24 (!) 139.5 kg (307 lb 8.7 oz)   11/15/23 (!) 136.7 kg (301 lb 5.9 oz)       Significant Labs:  Lab Results   Component Value Date    WBC 4.9 05/24/2024    HGB 15.7 05/24/2024    HCT 46.7 05/24/2024     05/24/2024     05/24/2024    K 4.4 05/24/2024     05/24/2024    CREATININE 0.99 05/24/2024    BUN 27 (H) 05/24/2024    CO2 26 05/24/2024     (H) 05/24/2024    CALCIUM 9.3 05/24/2024    ALKPHOS 42 02/07/2019    ALT 16 05/24/2024    AST 16 05/24/2024    ALBUMIN 4.4 05/24/2024    GLUF 142 (H) 05/01/2006    HGBA1C 10.0 (H) 05/24/2024    MICROALBUR 3.5 11/13/2023     No LMP for male patient.  No results found for this or any previous visit (from the past 72 hour(s)).    EKG:   Results for orders placed or performed in visit on 08/18/23   IN OFFICE EKG 12-LEAD (to [place])    Collection Time: 08/18/23  1:23 PM     "Narrative    Test Reason : E11.59,I15.2,    Vent. Rate : 064 BPM     Atrial Rate : 064 BPM     P-R Int : 188 ms          QRS Dur : 104 ms      QT Int : 420 ms       P-R-T Axes : 060 -27 025 degrees     QTc Int : 433 ms    Normal sinus rhythm  Normal ECG  When compared with ECG of 26-AUG-2011 07:00,  No significant change was found  Confirmed by Inocencio Bolaños JR., MD ([id number]) on 8/22/2023 4:06:24 PM    Referred By:             Confirmed By:Inocencio Bolaoñs       TTE:  No results found for this or any previous visit.  No results found for: "EF"   No results found for this or any previous visit.  CICI:  No results found for this or any previous visit.  Stress Test:  No results found for this or any previous visit.     LHC:  No results found for this or any previous visit.     PFT:  No results found for: "FEV1", "FVC", "OPL4DVB", "TLC", "DLCO"     ASSESSMENT/PLAN:       Pre-op Assessment    I have reviewed the Patient Summary Reports.     I have reviewed the Nursing Notes. I have reviewed the NPO Status.   I have reviewed the Medications.     Review of Systems  Anesthesia Hx:  No problems with previous Anesthesia             Denies Family Hx of Anesthesia complications.    Denies Personal Hx of Anesthesia complications.                    Social:  Alcohol Use, Non-Smoker       Hematology/Oncology:  Hematology Normal   Oncology Normal                                   EENT/Dental:  EENT/Dental Normal           Cardiovascular:     Hypertension              ECG has been reviewed.                          Pulmonary:  Pulmonary Normal                       Renal/:  Renal/ Normal                 Hepatic/GI:  Hepatic/GI Normal                 Musculoskeletal:  Musculoskeletal Normal                Neurological:  Neurology Normal                                      Endocrine:  Diabetes, type 2         Morbid Obesity / BMI > 40  Dermatological:  Skin Normal    Psych:  Psychiatric Normal                   "     Anesthesia Plan  Type of Anesthesia, risks & benefits discussed:    Anesthesia Type: Gen Natural Airway  Intra-op Monitoring Plan: Standard ASA Monitors  Induction:  IV  Informed Consent: Informed consent signed with the Patient and all parties understand the risks and agree with anesthesia plan.  All questions answered. Patient consented to blood products? No  ASA Score: 3  Day of Surgery Review of History & Physical: H&P Update referred to the surgeon/provider.    Ready For Surgery From Anesthesia Perspective.     .

## 2024-08-26 ENCOUNTER — TELEPHONE (OUTPATIENT)
Dept: ENDOSCOPY | Facility: HOSPITAL | Age: 64
End: 2024-08-26
Payer: COMMERCIAL

## 2024-08-26 NOTE — TELEPHONE ENCOUNTER
Patient called to accept earlier arrival time for colonoscopy on 8/30/24.  Patient verbalized understanding that his instructions will remain the same, except that his npo time is now 4:15 AM

## 2024-08-26 NOTE — TELEPHONE ENCOUNTER
Contacted patient to offer earlier arrival time for colonoscopy on 8/30/24.  Left voicemail requesting call back.

## 2024-08-30 ENCOUNTER — HOSPITAL ENCOUNTER (OUTPATIENT)
Facility: HOSPITAL | Age: 64
Discharge: HOME | End: 2024-08-30
Attending: INTERNAL MEDICINE | Admitting: INTERNAL MEDICINE
Payer: COMMERCIAL

## 2024-08-30 ENCOUNTER — ANESTHESIA (OUTPATIENT)
Dept: ENDOSCOPY | Facility: HOSPITAL | Age: 64
End: 2024-08-30
Payer: COMMERCIAL

## 2024-08-30 VITALS
OXYGEN SATURATION: 97 % | BODY MASS INDEX: 42.95 KG/M2 | RESPIRATION RATE: 16 BRPM | TEMPERATURE: 98 F | HEIGHT: 70 IN | SYSTOLIC BLOOD PRESSURE: 152 MMHG | WEIGHT: 300 LBS | HEART RATE: 70 BPM | DIASTOLIC BLOOD PRESSURE: 82 MMHG

## 2024-08-30 DIAGNOSIS — Z12.11 SCREEN FOR COLON CANCER: ICD-10-CM

## 2024-08-30 DIAGNOSIS — Z12.11 COLON CANCER SCREENING: Primary | ICD-10-CM

## 2024-08-30 LAB — POCT GLUCOSE: 222 MG/DL (ref 70–110)

## 2024-08-30 PROCEDURE — 25000003 PHARM REV CODE 250: Performed by: NURSE ANESTHETIST, CERTIFIED REGISTERED

## 2024-08-30 PROCEDURE — 82962 GLUCOSE BLOOD TEST: CPT | Performed by: INTERNAL MEDICINE

## 2024-08-30 PROCEDURE — 45385 COLONOSCOPY W/LESION REMOVAL: CPT | Mod: PT | Performed by: INTERNAL MEDICINE

## 2024-08-30 PROCEDURE — 63600175 PHARM REV CODE 636 W HCPCS: Performed by: NURSE ANESTHETIST, CERTIFIED REGISTERED

## 2024-08-30 PROCEDURE — 99900035 HC TECH TIME PER 15 MIN (STAT)

## 2024-08-30 PROCEDURE — 94761 N-INVAS EAR/PLS OXIMETRY MLT: CPT

## 2024-08-30 PROCEDURE — 37000008 HC ANESTHESIA 1ST 15 MINUTES: Performed by: INTERNAL MEDICINE

## 2024-08-30 PROCEDURE — 88305 TISSUE EXAM BY PATHOLOGIST: CPT | Performed by: PATHOLOGY

## 2024-08-30 PROCEDURE — 27201089 HC SNARE, DISP (ANY): Performed by: INTERNAL MEDICINE

## 2024-08-30 PROCEDURE — 37000009 HC ANESTHESIA EA ADD 15 MINS: Performed by: INTERNAL MEDICINE

## 2024-08-30 PROCEDURE — 45385 COLONOSCOPY W/LESION REMOVAL: CPT | Mod: 33,,, | Performed by: INTERNAL MEDICINE

## 2024-08-30 RX ORDER — PROPOFOL 10 MG/ML
VIAL (ML) INTRAVENOUS
Status: DISCONTINUED | OUTPATIENT
Start: 2024-08-30 | End: 2024-08-30

## 2024-08-30 RX ORDER — SODIUM CHLORIDE 9 MG/ML
INJECTION, SOLUTION INTRAVENOUS CONTINUOUS
Status: DISCONTINUED | OUTPATIENT
Start: 2024-08-30 | End: 2024-08-30 | Stop reason: HOSPADM

## 2024-08-30 RX ORDER — LIDOCAINE HYDROCHLORIDE 20 MG/ML
INJECTION INTRAVENOUS
Status: DISCONTINUED | OUTPATIENT
Start: 2024-08-30 | End: 2024-08-30

## 2024-08-30 RX ADMIN — PROPOFOL 80 MG: 10 INJECTION, EMULSION INTRAVENOUS at 08:08

## 2024-08-30 RX ADMIN — LIDOCAINE HYDROCHLORIDE 40 MG: 20 INJECTION INTRAVENOUS at 08:08

## 2024-08-30 RX ADMIN — GLYCOPYRROLATE 0.2 MG: 0.2 INJECTION, SOLUTION INTRAMUSCULAR; INTRAVENOUS at 08:08

## 2024-08-30 RX ADMIN — SODIUM CHLORIDE: 0.9 INJECTION, SOLUTION INTRAVENOUS at 08:08

## 2024-08-30 NOTE — H&P
Short Stay Endoscopy History and Physical    PCP - Kirti Phelan MD    Procedure - Colonoscopy  Sedation: GA  ASA - per anesthesia  Mallampati - per anesthesia  History of Anesthesia problems - no  Family history Anesthesia problems -  no     HPI:  This is a 64 y.o. male here for evaluation of : Screening for CRC    Reflux - no  Dysphagia - no  Abdominal pain - no  Diarrhea - no    ROS:  Constitutional: No fevers, chills, No weight loss  ENT: No allergies  CV: No chest pain  Pulm: No cough, No shortness of breath  Ophtho: No vision changes  GI: see HPI  Medical History:  has no past medical history on file.    Surgical History:  has a past surgical history that includes Adenoidectomy (1965); Eye surgery (05/2020); and Tonsillectomy (1965).    Family History: family history includes Diabetes in his mother; Heart disease in his father and mother; Hypertension in his mother; Vision loss in his mother.. Otherwise no colon cancer, inflammatory bowel disease, or GI malignancies.    Social History:  reports that he has never smoked. He has never used smokeless tobacco. He reports current alcohol use of about 6.0 standard drinks of alcohol per week. He reports that he does not use drugs.    Review of patient's allergies indicates:   Allergen Reactions    No known drug allergies        Medications:   Medications Prior to Admission   Medication Sig Dispense Refill Last Dose    atorvastatin (LIPITOR) 20 MG tablet Take 1 tablet (20 mg total) by mouth once daily. 90 tablet 0 8/28/2024    blood sugar diagnostic (ONETOUCH VERIO TEST STRIPS) Strp 1 strip by Misc.(Non-Drug; Combo Route) route 2 (two) times a day. 200 each 1     lancets 33 gauge Misc 1 lancet by Misc.(Non-Drug; Combo Route) route 2 (two) times a day. 200 each 1     lisinopriL (PRINIVIL,ZESTRIL) 20 MG tablet Take 1 tablet (20 mg total) by mouth once daily. 90 tablet 0 8/28/2024    metFORMIN (GLUCOPHAGE-XR) 500 MG ER 24hr tablet Take 2 tablets (1,000 mg total) by  mouth once daily. 180 tablet 0 8/28/2024    tadalafiL (CIALIS) 20 MG Tab Take 1 tablet (20 mg total) by mouth daily as needed. 30 tablet 3 More than a month       Objective Findings:    Vital Signs: Per nursing notes.    Physical Exam:  General Appearance: Well appearing in no acute distress  Head:   Normocephalic, without obvious abnormality  Eyes:    No scleral icterus  Airway: Open  Neck: No restriction in mobility  Lungs: CTA bilaterally in anterior and posterior fields, no wheezes, no crackles.  Heart:  Regular rate and rhythm, S1, S2 normal, no murmurs heard  Abdomen: Soft, non tender, non distended      Labs:  Lab Results   Component Value Date    WBC 4.9 05/24/2024    HGB 15.7 05/24/2024    HCT 46.7 05/24/2024     05/24/2024    CHOL 178 05/24/2024    TRIG 481 (H) 05/24/2024    HDL 32 (L) 05/24/2024    ALT 16 05/24/2024    AST 16 05/24/2024     05/24/2024    K 4.4 05/24/2024     05/24/2024    CREATININE 0.99 05/24/2024    BUN 27 (H) 05/24/2024    CO2 26 05/24/2024    TSH 2.13 08/18/2021    PSA 1.1 08/26/2011    GLUF 142 (H) 05/01/2006    HGBA1C 10.0 (H) 05/24/2024    MICROALBUR 3.5 11/13/2023         I have explained the risks and benefits of endoscopy procedures to the patient including but not limited to bleeding, perforation, infection, and death.    Thank you so much for allowing me to participate in the care of Luís Prieto MD

## 2024-08-30 NOTE — PROVATION PATIENT INSTRUCTIONS
Discharge Summary/Instructions after an Endoscopic Procedure  Patient Name: Luís Torres  Patient MRN: [id number]  Patient YOB: 1960 Friday, August 30, 2024  Chris Prieto MD  Dear patient,  As a result of recent federal legislation (The Federal Cures Act), you may   receive lab or pathology results from your procedure in your [facility]   account before your physician is able to contact you. Your physician or   their representative will relay the results to you with their   recommendations at their soonest availability.  Thank you,  RESTRICTIONS:  During your procedure today, you received medications for sedation.  These   medications may affect your judgment, balance and coordination.  Therefore,   for 24 hours, you have the following restrictions:   - DO NOT drive a car, operate machinery, make legal/financial decisions,   sign important papers or drink alcohol.    ACTIVITY:  Today: no heavy lifting, straining or running due to procedural   sedation/anesthesia.  The following day: return to full activity including work.  DIET:  Eat and drink normally unless instructed otherwise.     TREATMENT FOR COMMON SIDE EFFECTS:  - Mild abdominal pain, nausea, belching, bloating or excessive gas:  rest,   eat lightly and use a heating pad.  - Sore Throat: treat with throat lozenges and/or gargle with warm salt   water.  - Because air was used during the procedure, expelling large amounts of air   from your rectum or belching is normal.  - If a bowel prep was taken, you may not have a bowel movement for 1-3 days.    This is normal.  SYMPTOMS TO WATCH FOR AND REPORT TO YOUR PHYSICIAN:  1. Abdominal pain or bloating, other than gas cramps.  2. Chest pain.  3. Back pain.  4. Signs of infection such as: chills or fever occurring within 24 hours   after the procedure.  5. Rectal bleeding, which would show as bright red, maroon, or black stools.   (A tablespoon of blood from the rectum is not serious, especially if    hemorrhoids are present.)  6. Vomiting.  7. Weakness or dizziness.  GO DIRECTLY TO THE NEAREST EMERGENCY ROOM IF YOU HAVE ANY OF THE FOLLOWING:      Difficulty breathing              Chills and/or fever over 101 F   Persistent vomiting and/or vomiting blood   Severe abdominal pain   Severe chest pain   Black, tarry stools   Bleeding- more than one tablespoon   Any other symptom or condition that you feel may need urgent attention  Your doctor recommends these additional instructions:  If any biopsies were taken, your doctors clinic will contact you in 1 to 2   weeks with any results.  - Patient has a contact number available for emergencies.  The signs and   symptoms of potential delayed complications were discussed with the   patient.  Return to normal activities tomorrow.  Written discharge   instructions were provided to the patient.   - Discharge patient to home.   - Resume previous diet.   - Continue present medications.   - Await pathology results.   - Repeat colonoscopy in 3 years for surveillance.   For questions, problems or results please call your physician - Chris Prieto MD at Work:  [phone].  [facility], EMERGENCY ROOM PHONE NUMBER: [phone]  IF A COMPLICATION OR EMERGENCY SITUATION ARISES AND YOU ARE UNABLE TO REACH   YOUR PHYSICIAN - GO DIRECTLY TO THE EMERGENCY ROOM.  Chris Prieto MD  8/30/2024 9:02:51 AM  This report has been verified and signed electronically.  Dear patient,  As a result of recent federal legislation (The Federal Cures Act), you may   receive lab or pathology results from your procedure in your [facility]   account before your physician is able to contact you. Your physician or   their representative will relay the results to you with their   recommendations at their soonest availability.  Thank you,  [employer]OVATION

## 2024-08-30 NOTE — PLAN OF CARE
Pt arrived to recovery dosc via stretcher per ENDO team. Bedside report received. Pt attached to bedside monitor. VSS. Pt resting without distress post procedure. Pt on room air; oxygen sats 96%. Pt IV access clean, dry and intact, infusing Normal Saline. Safety maintained.

## 2024-08-30 NOTE — ANESTHESIA POSTPROCEDURE EVALUATION
Anesthesia Post Evaluation    Patient: Luís Torres    Procedure(s) Performed: Procedure(s) (LRB):  COLONOSCOPY (N/A)    Final Anesthesia Type: general      Patient location during evaluation: PACU  Patient participation: Yes- Able to Participate  Level of consciousness: awake and alert  Post-procedure vital signs: reviewed and stable  Pain management: adequate  Airway patency: patent    PONV status at discharge: No PONV  Anesthetic complications: no      Cardiovascular status: stable  Respiratory status: spontaneous ventilation  Hydration status: euvolemic  Follow-up not needed.              Vitals Value Taken Time   /82 08/30/24 0921   Temp 36.6 °C (97.9 °F) 08/30/24 0901   Pulse 70 08/30/24 0921   Resp 16 08/30/24 0921   SpO2 97 % 08/30/24 0921         Event Time   Out of Recovery 09:16:00         Pain/Landen Score: Landen Score: 10 (8/30/2024  9:16 AM)

## 2024-09-03 LAB
FINAL PATHOLOGIC DIAGNOSIS: NORMAL
GROSS: NORMAL
Lab: NORMAL

## 2024-09-04 ENCOUNTER — TELEPHONE (OUTPATIENT)
Dept: GASTROENTEROLOGY | Facility: CLINIC | Age: 64
End: 2024-09-04
Payer: COMMERCIAL

## 2024-09-04 NOTE — TELEPHONE ENCOUNTER
----- Message from Chris Prieto MD sent at 9/4/2024  7:20 AM CDT -----  Please call and notify patient, the colon polyps were benign.

## 2024-10-07 ENCOUNTER — OFFICE VISIT (OUTPATIENT)
Dept: PRIMARY CARE CLINIC | Facility: CLINIC | Age: 64
End: 2024-10-07
Payer: COMMERCIAL

## 2024-10-07 VITALS
SYSTOLIC BLOOD PRESSURE: 138 MMHG | HEART RATE: 77 BPM | WEIGHT: 306.69 LBS | HEIGHT: 70 IN | OXYGEN SATURATION: 97 % | DIASTOLIC BLOOD PRESSURE: 82 MMHG | BODY MASS INDEX: 43.91 KG/M2

## 2024-10-07 DIAGNOSIS — E11.59 HYPERTENSION ASSOCIATED WITH DIABETES: ICD-10-CM

## 2024-10-07 DIAGNOSIS — E78.5 HYPERLIPIDEMIA DUE TO TYPE 2 DIABETES MELLITUS: ICD-10-CM

## 2024-10-07 DIAGNOSIS — E78.1 HYPERTRIGLYCERIDEMIA: ICD-10-CM

## 2024-10-07 DIAGNOSIS — E11.69 HYPERLIPIDEMIA DUE TO TYPE 2 DIABETES MELLITUS: ICD-10-CM

## 2024-10-07 DIAGNOSIS — I15.2 HYPERTENSION ASSOCIATED WITH DIABETES: ICD-10-CM

## 2024-10-07 DIAGNOSIS — E11.65 TYPE 2 DIABETES MELLITUS WITH HYPERGLYCEMIA, WITHOUT LONG-TERM CURRENT USE OF INSULIN: Primary | ICD-10-CM

## 2024-10-07 LAB
OHS QRS DURATION: 110 MS
OHS QTC CALCULATION: 441 MS

## 2024-10-07 PROCEDURE — 99214 OFFICE O/P EST MOD 30 MIN: CPT | Mod: S$GLB,,, | Performed by: INTERNAL MEDICINE

## 2024-10-07 PROCEDURE — 93010 ELECTROCARDIOGRAM REPORT: CPT | Mod: S$GLB,,, | Performed by: INTERNAL MEDICINE

## 2024-10-07 PROCEDURE — 99999 PR PBB SHADOW E&M-EST. PATIENT-LVL III: CPT | Mod: PBBFAC,,, | Performed by: INTERNAL MEDICINE

## 2024-10-07 PROCEDURE — 93005 ELECTROCARDIOGRAM TRACING: CPT | Mod: S$GLB,,, | Performed by: INTERNAL MEDICINE

## 2024-10-07 RX ORDER — ATORVASTATIN CALCIUM 20 MG/1
20 TABLET, FILM COATED ORAL DAILY
Qty: 90 TABLET | Refills: 3 | Status: SHIPPED | OUTPATIENT
Start: 2024-10-07

## 2024-10-07 RX ORDER — LISINOPRIL 20 MG/1
20 TABLET ORAL DAILY
Qty: 90 TABLET | Refills: 3 | Status: SHIPPED | OUTPATIENT
Start: 2024-10-07

## 2024-10-07 RX ORDER — METFORMIN HYDROCHLORIDE 500 MG/1
1000 TABLET, EXTENDED RELEASE ORAL DAILY
Qty: 180 TABLET | Refills: 3 | Status: SHIPPED | OUTPATIENT
Start: 2024-10-07

## 2024-10-07 NOTE — ASSESSMENT & PLAN NOTE
Stable on lipitor 20 mg daily, no myalgias.  The 10-year ASCVD risk score (Jonas SHEEHAN, et al., 2019) is: 32.6%    Values used to calculate the score:      Age: 64 years      Sex: Male      Is Non- : No      Diabetic: Yes      Tobacco smoker: No      Systolic Blood Pressure: 138 mmHg      Is BP treated: Yes      HDL Cholesterol: 32 mg/dL      Total Cholesterol: 178 mg/dL

## 2024-10-07 NOTE — ASSESSMENT & PLAN NOTE
A1C 10 in 5/2024.  AM glucose has been in 200's. Stable on metformin 1000 mg ER, no hypoglycemia.  Had diarrhea with instant release dosing.

## 2024-10-07 NOTE — PROGRESS NOTES
[facility] Note    Chief Complaint      Chief Complaint   Patient presents with    Pre-op Exam     History of Present Illness      Luís Torres is a 64 y.o. male who presents today for DM2 f/u.  Patient comes to appointment alone.  Ortho: [facility]    Planning for pre op with Dr. Núñez on 10/16/24.  No CP/SOB, no issues with anesthesia in past.  Does all ADL's without difficulty, can walk up stairs.    Problem List Items Addressed This Visit       Hyperlipidemia due to type 2 diabetes mellitus    Current Assessment & Plan     Stable on lipitor 20 mg daily, no myalgias.  The 10-year ASCVD risk score (Jonas SHEEHAN, et al., 2019) is: 32.6%    Values used to calculate the score:      Age: 64 years      Sex: Male      Is Non- : No      Diabetic: Yes      Tobacco smoker: No      Systolic Blood Pressure: 138 mmHg      Is BP treated: Yes      HDL Cholesterol: 32 mg/dL      Total Cholesterol: 178 mg/dL           Relevant Medications    atorvastatin (LIPITOR) 20 MG tablet    metFORMIN (GLUCOPHAGE-XR) 500 MG ER 24hr tablet    Hypertension associated with diabetes    Current Assessment & Plan     BP controlled on lisinopril 20 mg daily, no CP/SOB/HA.  Doesn't add salt.         Relevant Medications    lisinopriL (PRINIVIL,ZESTRIL) 20 MG tablet    metFORMIN (GLUCOPHAGE-XR) 500 MG ER 24hr tablet    Hypertriglyceridemia    Current Assessment & Plan     TG in 200's on 11/2023 labs.         Type 2 diabetes mellitus with hyperglycemia, without long-term current use of insulin - Primary    Current Assessment & Plan     A1C 10 in 5/2024.  AM glucose has been in 200's. Stable on metformin 1000 mg ER, no hypoglycemia.  Had diarrhea with instant release dosing.           Relevant Medications    metFORMIN (GLUCOPHAGE-XR) 500 MG ER 24hr tablet    Other Relevant Orders    IN OFFICE EKG 12-LEAD (to Muse)    Hemoglobin A1C    Comprehensive Metabolic Panel       Health Maintenance   Topic Date Due    Eye  Exam  05/19/2024    Hemoglobin A1c  08/24/2024    TETANUS VACCINE  11/15/2024 (Originally 1/28/1978)    Shingles Vaccine (1 of 2) 11/15/2024 (Originally 1/28/2010)    PROSTATE-SPECIFIC ANTIGEN  11/13/2024    Lipid Panel  05/24/2025    Foot Exam  05/29/2025    Low Dose Statin  10/07/2025    Colorectal Cancer Screening  08/30/2027    Hepatitis C Screening  Completed       History reviewed. No pertinent past medical history.    Past Surgical History:   Procedure Laterality Date    ADENOIDECTOMY  1965    CATARACT EXTRACTION Left     COLONOSCOPY N/A 08/30/2024    Procedure: COLONOSCOPY;  Surgeon: Chris Prieto MD;  Location: [facility] ENDOSCOPY;  Service: Endoscopy;  Laterality: N/A;  Ref by Dr RODOLFO Phelan, PEG, portal - PC  8/21/24- lvm/portal for pc-- pt returned call, pc complete. DBM    EYE SURGERY  05/2020    Cataract removal R eye    TONSILLECTOMY  1965       family history includes Diabetes in his mother; Heart disease in his father and mother; Hypertension in his mother; Vision loss in his mother.    Social History     Tobacco Use    Smoking status: Never    Smokeless tobacco: Never   Substance Use Topics    Alcohol use: Yes     Alcohol/week: 6.0 standard drinks of alcohol     Types: 4 Cans of beer, 2 Shots of liquor per week    Drug use: No       Review of Systems   Constitutional:  Negative for chills and fever.   Respiratory:  Negative for cough and shortness of breath.    Cardiovascular:  Negative for chest pain and palpitations.   Gastrointestinal:  Negative for constipation, diarrhea, nausea and vomiting.   Genitourinary:  Negative for dysuria and hematuria.   Musculoskeletal:  Negative for falls.   Neurological:  Negative for headaches.        Outpatient Encounter Medications as of 10/7/2024   Medication Sig Dispense Refill    blood sugar diagnostic (ONETOUCH VERIO TEST STRIPS) Strp 1 strip by Misc.(Non-Drug; Combo Route) route 2 (two) times a day. 200 each 1    lancets 33 gauge Misc 1 lancet by  "Misc.(Non-Drug; Combo Route) route 2 (two) times a day. 200 each 1    tadalafiL (CIALIS) 20 MG Tab Take 1 tablet (20 mg total) by mouth daily as needed. 30 tablet 3    [DISCONTINUED] atorvastatin (LIPITOR) 20 MG tablet Take 1 tablet (20 mg total) by mouth once daily. 90 tablet 0    [DISCONTINUED] lisinopriL (PRINIVIL,ZESTRIL) 20 MG tablet Take 1 tablet (20 mg total) by mouth once daily. 90 tablet 0    [DISCONTINUED] metFORMIN (GLUCOPHAGE-XR) 500 MG ER 24hr tablet Take 2 tablets (1,000 mg total) by mouth once daily. 180 tablet 0    atorvastatin (LIPITOR) 20 MG tablet Take 1 tablet (20 mg total) by mouth once daily. 90 tablet 3    lisinopriL (PRINIVIL,ZESTRIL) 20 MG tablet Take 1 tablet (20 mg total) by mouth once daily. 90 tablet 3    metFORMIN (GLUCOPHAGE-XR) 500 MG ER 24hr tablet Take 2 tablets (1,000 mg total) by mouth once daily. 180 tablet 3     No facility-administered encounter medications on file as of 10/7/2024.        Review of patient's allergies indicates:   Allergen Reactions    No known drug allergies        Physical Exam      Vital Signs  Pulse: 77  SpO2: 97 %  BP: 138/82  Height and Weight  Height: 5' 10" (177.8 cm)  Weight: (!) 139.1 kg (306 lb 10.6 oz)  BSA (Calculated - sq m): 2.62 sq meters  BMI (Calculated): 44  Weight in (lb) to have BMI = 25: 173.9]  Body mass index is 44 kg/m².    Physical Exam  Constitutional:       Appearance: He is well-developed.   HENT:      Head: Normocephalic and atraumatic.   Neck:      Thyroid: No thyromegaly.   Cardiovascular:      Rate and Rhythm: Normal rate and regular rhythm.      Heart sounds: No murmur heard.  Pulmonary:      Effort: Pulmonary effort is normal. No respiratory distress.      Breath sounds: Normal breath sounds.   Abdominal:      General: There is no distension.      Palpations: Abdomen is soft.      Tenderness: There is no abdominal tenderness.   Skin:     General: Skin is warm and dry.   Neurological:      Mental Status: He is alert and " "oriented to person, place, and time.   Psychiatric:         Behavior: Behavior normal.          Laboratory:  CBC:  No results for input(s): "WBC", "RBC", "HGB", "HCT", "PLT", "MCV", "MCH", "MCHC" in the last 2160 hours.      CMP:  No results for input(s): "GLU", "CALCIUM", "ALBUMIN", "PROT", "NA", "K", "CO2", "CL", "BUN", "ALKPHOS", "ALT", "AST", "BILITOT" in the last 2160 hours.    Invalid input(s): "CREATININ"    URINALYSIS:  No results for input(s): "COLORU", "CLARITYU", "SPECGRAV", "PHUR", "PROTEINUA", "GLUCOSEU", "BILIRUBINCON", "BLOODU", "WBCU", "RBCU", "BACTERIA", "MUCUS", "NITRITE", "LEUKOCYTESUR", "UROBILINOGEN", "HYALINECASTS" in the last 2160 hours.   LIPIDS:  No results for input(s): "TSH", "HDL", "CHOL", "TRIG", "LDLCALC", "CHOLHDL", "NONHDLCHOL", "TOTALCHOLEST" in the last 2160 hours.      TSH:  No results for input(s): "TSH" in the last 2160 hours.  A1C:  No results for input(s): "HGBA1C" in the last 2160 hours.        Radiology:  No results found in the last 30 days.     Assessment/Plan     Luís Torres is a 64 y.o.male with:    1. Type 2 diabetes mellitus with hyperglycemia, without long-term current use of insulin  - IN OFFICE EKG 12-LEAD (to [facility])  - metFORMIN (GLUCOPHAGE-XR) 500 MG ER 24hr tablet; Take 2 tablets (1,000 mg total) by mouth once daily.  Dispense: 180 tablet; Refill: 3  - Hemoglobin A1C; Future  - Comprehensive Metabolic Panel; Future  - Hemoglobin A1C  - Comprehensive Metabolic Panel    2. Hypertriglyceridemia    3. Hyperlipidemia due to type 2 diabetes mellitus  - atorvastatin (LIPITOR) 20 MG tablet; Take 1 tablet (20 mg total) by mouth once daily.  Dispense: 90 tablet; Refill: 3    4. Hypertension associated with diabetes  - lisinopriL (PRINIVIL,ZESTRIL) 20 MG tablet; Take 1 tablet (20 mg total) by mouth once daily.  Dispense: 90 tablet; Refill: 3      -EKG today largely unchanged from 8/2024  -repeat labs ASAP, needs better diabetes control  -Continue current medications and " maintain follow up with specialists.    -Follow up in about 3 months (around 1/7/2025) for follow up of medical problems.       Kirti Phelan MD  [facility]

## 2024-10-09 LAB
ALBUMIN SERPL-MCNC: 4.5 G/DL (ref 3.6–5.1)
ALBUMIN/GLOB SERPL: 2 (CALC) (ref 1–2.5)
ALP SERPL-CCNC: 48 U/L (ref 35–144)
ALT SERPL-CCNC: 13 U/L (ref 9–46)
AST SERPL-CCNC: 14 U/L (ref 10–35)
BILIRUB SERPL-MCNC: 1.3 MG/DL (ref 0.2–1.2)
BUN SERPL-MCNC: 21 MG/DL (ref 7–25)
BUN/CREAT SERPL: ABNORMAL (CALC) (ref 6–22)
CALCIUM SERPL-MCNC: 9.6 MG/DL (ref 8.6–10.3)
CHLORIDE SERPL-SCNC: 100 MMOL/L (ref 98–110)
CO2 SERPL-SCNC: 25 MMOL/L (ref 20–32)
CREAT SERPL-MCNC: 1.03 MG/DL (ref 0.7–1.35)
EGFR: 81 ML/MIN/1.73M2
GLOBULIN SER CALC-MCNC: 2.2 G/DL (CALC) (ref 1.9–3.7)
GLUCOSE SERPL-MCNC: 220 MG/DL (ref 65–99)
HBA1C MFR BLD: 10.3 % OF TOTAL HGB
POTASSIUM SERPL-SCNC: 4.5 MMOL/L (ref 3.5–5.3)
PROT SERPL-MCNC: 6.7 G/DL (ref 6.1–8.1)
SODIUM SERPL-SCNC: 136 MMOL/L (ref 135–146)

## 2024-11-05 ENCOUNTER — EXTERNAL HOME HEALTH (OUTPATIENT)
Dept: HOME HEALTH SERVICES | Facility: HOSPITAL | Age: 64
End: 2024-11-05
Payer: COMMERCIAL

## 2024-12-02 RX ORDER — LANCETS 33 GAUGE
1 EACH MISCELLANEOUS 2 TIMES DAILY
Qty: 200 EACH | Refills: 1 | Status: SHIPPED | OUTPATIENT
Start: 2024-12-02

## 2024-12-02 NOTE — TELEPHONE ENCOUNTER
No care due was identified.  Health [facility] Embedded Care Due Messages. Reference number: [id number].   12/02/2024 3:37:27 PM CST

## 2024-12-20 NOTE — TELEPHONE ENCOUNTER
No care due was identified.  Health [facility] Embedded Care Due Messages. Reference number: [id number].   12/20/2024 2:50:21 PM CST

## 2025-01-07 ENCOUNTER — OFFICE VISIT (OUTPATIENT)
Dept: PRIMARY CARE CLINIC | Facility: CLINIC | Age: 65
End: 2025-01-07
Payer: MEDICARE

## 2025-01-07 VITALS
BODY MASS INDEX: 42.77 KG/M2 | DIASTOLIC BLOOD PRESSURE: 80 MMHG | WEIGHT: 298.75 LBS | HEIGHT: 70 IN | HEART RATE: 66 BPM | OXYGEN SATURATION: 98 % | SYSTOLIC BLOOD PRESSURE: 134 MMHG

## 2025-01-07 DIAGNOSIS — E66.813 CLASS 3 SEVERE OBESITY DUE TO EXCESS CALORIES WITH SERIOUS COMORBIDITY AND BODY MASS INDEX (BMI) OF 40.0 TO 44.9 IN ADULT: Primary | ICD-10-CM

## 2025-01-07 DIAGNOSIS — Z12.5 SCREENING PSA (PROSTATE SPECIFIC ANTIGEN): ICD-10-CM

## 2025-01-07 DIAGNOSIS — I15.2 HYPERTENSION ASSOCIATED WITH DIABETES: ICD-10-CM

## 2025-01-07 DIAGNOSIS — E11.65 TYPE 2 DIABETES MELLITUS WITH HYPERGLYCEMIA, WITHOUT LONG-TERM CURRENT USE OF INSULIN: ICD-10-CM

## 2025-01-07 DIAGNOSIS — E78.5 HYPERLIPIDEMIA DUE TO TYPE 2 DIABETES MELLITUS: ICD-10-CM

## 2025-01-07 DIAGNOSIS — E11.69 HYPERLIPIDEMIA DUE TO TYPE 2 DIABETES MELLITUS: ICD-10-CM

## 2025-01-07 DIAGNOSIS — E11.59 HYPERTENSION ASSOCIATED WITH DIABETES: ICD-10-CM

## 2025-01-07 DIAGNOSIS — E66.01 CLASS 3 SEVERE OBESITY DUE TO EXCESS CALORIES WITH SERIOUS COMORBIDITY AND BODY MASS INDEX (BMI) OF 40.0 TO 44.9 IN ADULT: Primary | ICD-10-CM

## 2025-01-07 PROCEDURE — 99213 OFFICE O/P EST LOW 20 MIN: CPT | Mod: PBBFAC | Performed by: INTERNAL MEDICINE

## 2025-01-07 PROCEDURE — 99999 PR PBB SHADOW E&M-EST. PATIENT-LVL III: CPT | Mod: PBBFAC,,, | Performed by: INTERNAL MEDICINE

## 2025-01-07 NOTE — ASSESSMENT & PLAN NOTE
A1C 10.3 in 10/2024.  Stable on metformin 1000 mg ER and jardiance 25 mg, no hypoglycemia.  Had diarrhea with metformin instant release dosing.

## 2025-01-07 NOTE — ASSESSMENT & PLAN NOTE
Stable on lipitor 20 mg daily, no myalgias.  The 10-year ASCVD risk score (Jonas SHEEHAN, et al., 2019) is: 31.3%    Values used to calculate the score:      Age: 64 years      Sex: Male      Is Non- : No      Diabetic: Yes      Tobacco smoker: No      Systolic Blood Pressure: 134 mmHg      Is BP treated: Yes      HDL Cholesterol: 32 mg/dL      Total Cholesterol: 178 mg/dL

## 2025-01-07 NOTE — PROGRESS NOTES
[facility] Note    Chief Complaint      Chief Complaint   Patient presents with    Follow-up     History of Present Illness      Luís Torres is a 64 y.o. male who presents today for DM2 f/u.  Patient comes to appointment alone.  Ortho: [facility]    Had left knee TKA Dr. Núñez on 10/16/24, finishing up with rehab.     Problem List Items Addressed This Visit       Class 3 severe obesity due to excess calories with serious comorbidity and body mass index (BMI) of 40.0 to 44.9 in adult - Primary    Current Assessment & Plan     Lost 8 pounds since last visit.  Planning to exercise more.         Hyperlipidemia due to type 2 diabetes mellitus    Current Assessment & Plan     Stable on lipitor 20 mg daily, no myalgias.  The 10-year ASCVD risk score (Jonas SHEEHAN, et al., 2019) is: 31.3%    Values used to calculate the score:      Age: 64 years      Sex: Male      Is Non- : No      Diabetic: Yes      Tobacco smoker: No      Systolic Blood Pressure: 134 mmHg      Is BP treated: Yes      HDL Cholesterol: 32 mg/dL      Total Cholesterol: 178 mg/dL           Hypertension associated with diabetes    Current Assessment & Plan     BP controlled on lisinopril 20 mg daily, no CP/SOB/HA.  Doesn't add salt.         Type 2 diabetes mellitus with hyperglycemia, without long-term current use of insulin    Current Assessment & Plan     A1C 10.3 in 10/2024.  Stable on metformin 1000 mg ER and jardiance 25 mg, no hypoglycemia.  Had diarrhea with metformin instant release dosing.           Relevant Orders    CBC Auto Differential    Comprehensive Metabolic Panel    Lipid Panel    Microalbumin/Creatinine Ratio, Urine    Hemoglobin A1C     Other Visit Diagnoses       Screening PSA (prostate specific antigen)        Relevant Orders    PSA, SCREENING              Health Maintenance   Topic Date Due    Eye Exam  05/19/2024    PROSTATE-SPECIFIC ANTIGEN  11/13/2024    Diabetes Urine Screening  11/13/2024     Hemoglobin A1c  01/08/2025    RSV Vaccine (Age 60+ and Pregnant patients) (1 - Risk 60-74 years 1-dose series) 01/07/2025 (Originally 1/28/2020)    TETANUS VACCINE  01/07/2026 (Originally 1/28/1978)    Shingles Vaccine (1 of 2) 01/07/2026 (Originally 1/28/2010)    COVID-19 Vaccine (4 - 2024-25 season) 01/07/2026 (Originally 9/1/2024)    Pneumococcal Vaccines (Age 50+) (1 of 2 - PCV) 01/07/2026 (Originally 1/28/1979)    Lipid Panel  05/24/2025    Foot Exam  05/29/2025    Low Dose Statin  01/07/2026    Colorectal Cancer Screening  08/30/2027    Hepatitis C Screening  Completed    HIV Screening  Completed    Influenza Vaccine  Addressed       History reviewed. No pertinent past medical history.    Past Surgical History:   Procedure Laterality Date    ADENOIDECTOMY  1965    CATARACT EXTRACTION Left     COLONOSCOPY N/A 08/30/2024    Procedure: COLONOSCOPY;  Surgeon: Chris Prieto MD;  Location: [facility];  Service: Endoscopy;  Laterality: N/A;  Ref by Dr RODOLFO Phelan, PEG, portal - PC  8/21/24- lvm/portal for pc-- pt returned call, pc complete. DBM    EYE SURGERY  05/2020    Cataract removal R eye    knee replacement Left     TONSILLECTOMY  1965       family history includes Diabetes in his mother; Heart disease in his father and mother; Hypertension in his mother; Vision loss in his mother.    Social History     Tobacco Use    Smoking status: Never    Smokeless tobacco: Never   Substance Use Topics    Alcohol use: Yes     Alcohol/week: 6.0 standard drinks of alcohol     Types: 4 Cans of beer, 2 Shots of liquor per week    Drug use: No       Review of Systems   Constitutional:  Negative for chills and fever.   Respiratory:  Negative for cough and shortness of breath.    Cardiovascular:  Negative for chest pain and palpitations.   Gastrointestinal:  Negative for constipation, diarrhea, nausea and vomiting.   Genitourinary:  Negative for dysuria and hematuria.   Musculoskeletal:  Negative for falls.   Neurological:   "Negative for headaches.        Outpatient Encounter Medications as of 1/7/2025   Medication Sig Dispense Refill    atorvastatin (LIPITOR) 20 MG tablet Take 1 tablet (20 mg total) by mouth once daily. 90 tablet 3    blood sugar diagnostic (ONETOUCH VERIO TEST STRIPS) Strp 1 strip by Misc.(Non-Drug; Combo Route) route 2 (two) times a day. 200 each 1    empagliflozin (JARDIANCE) 25 mg tablet Take 1 tablet (25 mg total) by mouth once daily. 90 tablet 3    lancets 33 gauge Misc 1 lancet  by Misc.(Non-Drug; Combo Route) route 2 (two) times a day. 200 each 1    lisinopriL (PRINIVIL,ZESTRIL) 20 MG tablet Take 1 tablet (20 mg total) by mouth once daily. 90 tablet 3    metFORMIN (GLUCOPHAGE-XR) 500 MG ER 24hr tablet Take 2 tablets (1,000 mg total) by mouth once daily. 180 tablet 3    tadalafiL (CIALIS) 20 MG Tab Take 1 tablet (20 mg total) by mouth daily as needed. 30 tablet 3    [DISCONTINUED] empagliflozin (JARDIANCE) 25 mg tablet Take 1 tablet (25 mg total) by mouth once daily. 90 tablet 3     No facility-administered encounter medications on file as of 1/7/2025.        Review of patient's allergies indicates:   Allergen Reactions    No known drug allergies        Physical Exam      Vital Signs  Pulse: 66  SpO2: 98 %  BP: 134/80  Pain Score: 0-No pain  Height and Weight  Height: 5' 10" (177.8 cm)  Weight: 135.5 kg (298 lb 11.6 oz)  BSA (Calculated - sq m): 2.59 sq meters  BMI (Calculated): 42.9  Weight in (lb) to have BMI = 25: 173.9]  Body mass index is 42.86 kg/m².    Physical Exam  Constitutional:       Appearance: He is well-developed.   HENT:      Head: Normocephalic and atraumatic.   Neck:      Thyroid: No thyromegaly.   Cardiovascular:      Rate and Rhythm: Normal rate and regular rhythm.      Heart sounds: No murmur heard.  Pulmonary:      Effort: Pulmonary effort is normal. No respiratory distress.      Breath sounds: Normal breath sounds.   Abdominal:      General: There is no distension.      Palpations: Abdomen " "is soft.      Tenderness: There is no abdominal tenderness.   Skin:     General: Skin is warm and dry.   Neurological:      Mental Status: He is alert and oriented to person, place, and time.   Psychiatric:         Behavior: Behavior normal.          Laboratory:  CBC:  No results for input(s): "WBC", "RBC", "HGB", "HCT", "PLT", "MCV", "MCH", "MCHC" in the last 2160 hours.      CMP:  No results for input(s): "GLU", "CALCIUM", "ALBUMIN", "PROT", "NA", "K", "CO2", "CL", "BUN", "ALKPHOS", "ALT", "AST", "BILITOT" in the last 2160 hours.    Invalid input(s): "CREATININ"    URINALYSIS:  No results for input(s): "COLORU", "CLARITYU", "SPECGRAV", "PHUR", "PROTEINUA", "GLUCOSEU", "BILIRUBINCON", "BLOODU", "WBCU", "RBCU", "BACTERIA", "MUCUS", "NITRITE", "LEUKOCYTESUR", "UROBILINOGEN", "HYALINECASTS" in the last 2160 hours.   LIPIDS:  No results for input(s): "TSH", "HDL", "CHOL", "TRIG", "LDLCALC", "CHOLHDL", "NONHDLCHOL", "TOTALCHOLEST" in the last 2160 hours.      TSH:  No results for input(s): "TSH" in the last 2160 hours.  A1C:  No results for input(s): "HGBA1C" in the last 2160 hours.        Radiology:  No results found in the last 30 days.     Assessment/Plan     Luís Torres is a 64 y.o.male with:    1. Class 3 severe obesity due to excess calories with serious comorbidity and body mass index (BMI) of 40.0 to 44.9 in adult    2. Hypertension associated with diabetes    3. Hyperlipidemia due to type 2 diabetes mellitus    4. Type 2 diabetes mellitus with hyperglycemia, without long-term current use of insulin  - CBC Auto Differential; Future  - Comprehensive Metabolic Panel; Future  - Lipid Panel; Future  - Microalbumin/Creatinine Ratio, Urine; Future  - Hemoglobin A1C; Future  - CBC Auto Differential  - Comprehensive Metabolic Panel  - Lipid Panel  - Microalbumin/Creatinine Ratio, Urine  - Hemoglobin A1C    5. Screening PSA (prostate specific antigen)  - PSA, SCREENING; Future  - PSA, SCREENING        -repeat labs " ASAP  -declines vaccine  -get eye exam from Dr. Suresh  -Continue current medications and maintain follow up with specialists.    -Follow up in about 6 months (around 7/7/2025) for follow up of medical problems.       Kirti Phelan MD  [facility]

## 2025-01-08 ENCOUNTER — PATIENT OUTREACH (OUTPATIENT)
Dept: ADMINISTRATIVE | Facility: HOSPITAL | Age: 65
End: 2025-01-08
Payer: MEDICARE

## 2025-01-08 NOTE — LETTER
AUTHORIZATION FOR RELEASE OF   CONFIDENTIAL INFORMATION    Dear Dr. Tran,    We are seeing Luís Torres, date of birth 1960, in the clinic at [facility]. Kirti Phelan MD is the patient's PCP. Luís Torres has an outstanding lab/procedure at the time we reviewed his chart. In order to help keep his health information updated, he has authorized us to request the following medical record(s):        (  )  MAMMOGRAM                                      (  )  COLONOSCOPY      (  )  PAP SMEAR                                          (  )  OUTSIDE LAB RESULTS     (  )  DEXA SCAN                                          (X)  DIABETIC EYE EXAM            (  )  FOOT EXAM                                          (  )  ENTIRE RECORD     (  )  OUTSIDE IMMUNIZATIONS                 (  )  _______________         Please fax records to [facility], Jordan, Jenna C., MD, [phone].     If you have any questions, please contact Ya at [phone].           Patient Name: Luís Torres  : 1960  Patient Phone #: [phone]

## 2025-01-08 NOTE — PROGRESS NOTES
Health Maintenance Due   Topic Date Due    RSV Vaccine (Age 60+ and Pregnant patients) (1 - Risk 60-74 years 1-dose series) Never done    Eye Exam  05/19/2024    PROSTATE-SPECIFIC ANTIGEN  11/13/2024    Diabetes Urine Screening  11/13/2024    Hemoglobin A1c  01/08/2025     Chart review completed. HM Updated. Triggered Links. Immunizations reviewed and updated. Care Everywhere Updated. Care Team Updated.  Requested eye exam records from [facility].

## 2025-01-09 LAB
ALBUMIN SERPL-MCNC: 4.5 G/DL (ref 3.6–5.1)
ALBUMIN/CREAT UR: 5 MG/G CREAT
ALBUMIN/GLOB SERPL: 1.8 (CALC) (ref 1–2.5)
ALP SERPL-CCNC: 53 U/L (ref 35–144)
ALT SERPL-CCNC: 10 U/L (ref 9–46)
AST SERPL-CCNC: 13 U/L (ref 10–35)
BASOPHILS # BLD AUTO: 31 CELLS/UL (ref 0–200)
BASOPHILS NFR BLD AUTO: 0.6 %
BILIRUB SERPL-MCNC: 0.8 MG/DL (ref 0.2–1.2)
BUN SERPL-MCNC: 35 MG/DL (ref 7–25)
BUN/CREAT SERPL: 32 (CALC) (ref 6–22)
CALCIUM SERPL-MCNC: 9.8 MG/DL (ref 8.6–10.3)
CHLORIDE SERPL-SCNC: 103 MMOL/L (ref 98–110)
CHOLEST SERPL-MCNC: 168 MG/DL
CHOLEST/HDLC SERPL: 4.9 (CALC)
CO2 SERPL-SCNC: 29 MMOL/L (ref 20–32)
CREAT SERPL-MCNC: 1.09 MG/DL (ref 0.7–1.35)
CREAT UR-MCNC: 58 MG/DL (ref 20–320)
EGFR: 76 ML/MIN/1.73M2
EOSINOPHIL # BLD AUTO: 151 CELLS/UL (ref 15–500)
EOSINOPHIL NFR BLD AUTO: 2.9 %
ERYTHROCYTE [DISTWIDTH] IN BLOOD BY AUTOMATED COUNT: 12.4 % (ref 11–15)
GLOBULIN SER CALC-MCNC: 2.5 G/DL (CALC) (ref 1.9–3.7)
GLUCOSE SERPL-MCNC: 174 MG/DL (ref 65–99)
HBA1C MFR BLD: 8.5 % OF TOTAL HGB
HCT VFR BLD AUTO: 49.1 % (ref 38.5–50)
HDLC SERPL-MCNC: 34 MG/DL
HGB BLD-MCNC: 16.3 G/DL (ref 13.2–17.1)
LDLC SERPL CALC-MCNC: 82 MG/DL (CALC)
LYMPHOCYTES # BLD AUTO: 1550 CELLS/UL (ref 850–3900)
LYMPHOCYTES NFR BLD AUTO: 29.8 %
MCH RBC QN AUTO: 31 PG (ref 27–33)
MCHC RBC AUTO-ENTMCNC: 33.2 G/DL (ref 32–36)
MCV RBC AUTO: 93.3 FL (ref 80–100)
MICROALBUMIN UR-MCNC: 0.3 MG/DL
MONOCYTES # BLD AUTO: 458 CELLS/UL (ref 200–950)
MONOCYTES NFR BLD AUTO: 8.8 %
NEUTROPHILS # BLD AUTO: 3011 CELLS/UL (ref 1500–7800)
NEUTROPHILS NFR BLD AUTO: 57.9 %
NONHDLC SERPL-MCNC: 134 MG/DL (CALC)
PLATELET # BLD AUTO: 179 THOUSAND/UL (ref 140–400)
PMV BLD REES-ECKER: 9.9 FL (ref 7.5–12.5)
POTASSIUM SERPL-SCNC: 5.1 MMOL/L (ref 3.5–5.3)
PROT SERPL-MCNC: 7 G/DL (ref 6.1–8.1)
PSA SERPL-MCNC: 3.87 NG/ML
RBC # BLD AUTO: 5.26 MILLION/UL (ref 4.2–5.8)
SODIUM SERPL-SCNC: 140 MMOL/L (ref 135–146)
TRIGL SERPL-MCNC: 387 MG/DL
WBC # BLD AUTO: 5.2 THOUSAND/UL (ref 3.8–10.8)

## 2025-01-13 DIAGNOSIS — E11.65 TYPE 2 DIABETES MELLITUS WITH HYPERGLYCEMIA, WITHOUT LONG-TERM CURRENT USE OF INSULIN: Primary | ICD-10-CM

## 2025-02-03 NOTE — ASSESSMENT & PLAN NOTE
Stable on cialis PRN.   The patient is a 60y year old Male complaining of see chief complaint quote.

## 2025-03-05 NOTE — TELEPHONE ENCOUNTER
Pended meds to be sent to [facility], can you send in for Dr JHONY lemons?    Retail Pharmacy Prior Authorization Team   Phone: [phone]    PA Initiation    Medication: AMPHETAMINE-DEXTROAMPHET ER 30 MG PO CP24  Insurance Company: [employer] - Phone [phone] Fax [phone]  Pharmacy Filling the Rx: [id number] - [place], [place] - [address] [place] [address] AT [facility] [address]  Filling Pharmacy Phone: [phone]  Filling Pharmacy Fax:    Start Date: 3/5/2025    Note: Due to record-high volumes, our turn-around time is taking longer than usual . We are currently 4  business days behind in the pools.   We are working diligently to submit all requests in a timely manner and in the order they are received. Please only flag TRUE URGENT requests as high priority to the pool at this time.   If you have questions on status of PA's,  please send a note/message in the active PA encounter and send back to the [facility] PA pool [[phone]].    If you have questions about the turn-around time or about our process, please reach out to our supervisor Kelly Olivas.   Thank you!   RPPA (Retail Pharmacy Prior Authorization) team    [id number]

## 2025-03-17 DIAGNOSIS — E11.69 HYPERLIPIDEMIA DUE TO TYPE 2 DIABETES MELLITUS: ICD-10-CM

## 2025-03-17 DIAGNOSIS — E78.5 HYPERLIPIDEMIA DUE TO TYPE 2 DIABETES MELLITUS: ICD-10-CM

## 2025-03-17 DIAGNOSIS — E11.59 HYPERTENSION ASSOCIATED WITH DIABETES: ICD-10-CM

## 2025-03-17 DIAGNOSIS — I15.2 HYPERTENSION ASSOCIATED WITH DIABETES: ICD-10-CM

## 2025-03-17 DIAGNOSIS — E11.65 TYPE 2 DIABETES MELLITUS WITH HYPERGLYCEMIA, WITHOUT LONG-TERM CURRENT USE OF INSULIN: ICD-10-CM

## 2025-03-17 NOTE — TELEPHONE ENCOUNTER
No care due was identified.  [facility] Embedded Care Due Messages. Reference number: [id number].   3/17/2025 4:45:31 PM CDT

## 2025-03-18 RX ORDER — LISINOPRIL 20 MG/1
20 TABLET ORAL DAILY
Qty: 90 TABLET | Refills: 3 | Status: SHIPPED | OUTPATIENT
Start: 2025-03-18

## 2025-03-18 RX ORDER — METFORMIN HYDROCHLORIDE 500 MG/1
1000 TABLET, EXTENDED RELEASE ORAL DAILY
Qty: 180 TABLET | Refills: 3 | Status: SHIPPED | OUTPATIENT
Start: 2025-03-18

## 2025-03-18 RX ORDER — ATORVASTATIN CALCIUM 20 MG/1
20 TABLET, FILM COATED ORAL DAILY
Qty: 90 TABLET | Refills: 3 | Status: SHIPPED | OUTPATIENT
Start: 2025-03-18

## 2025-03-21 NOTE — ASSESSMENT & PLAN NOTE
Bowls 4 times per week but doesn't formally exercise.  Diet has not been good, has not been stress eating.   Allergy;

## 2025-04-25 ENCOUNTER — HOSPITAL ENCOUNTER (EMERGENCY)
Facility: HOSPITAL | Age: 65
Discharge: HOME | End: 2025-04-25
Attending: STUDENT IN AN ORGANIZED HEALTH CARE EDUCATION/TRAINING PROGRAM
Payer: MEDICARE

## 2025-04-25 VITALS
TEMPERATURE: 98 F | BODY MASS INDEX: 42.95 KG/M2 | HEART RATE: 70 BPM | RESPIRATION RATE: 17 BRPM | OXYGEN SATURATION: 95 % | WEIGHT: 300 LBS | DIASTOLIC BLOOD PRESSURE: 73 MMHG | SYSTOLIC BLOOD PRESSURE: 158 MMHG | HEIGHT: 70 IN

## 2025-04-25 DIAGNOSIS — R11.14 BILIOUS VOMITING WITH NAUSEA: Primary | ICD-10-CM

## 2025-04-25 DIAGNOSIS — R10.9 ABDOMINAL PAIN: ICD-10-CM

## 2025-04-25 DIAGNOSIS — K29.80 DUODENITIS: ICD-10-CM

## 2025-04-25 LAB
ABSOLUTE EOSINOPHIL (OHS): 0.13 K/UL
ABSOLUTE MONOCYTE (OHS): 0.47 K/UL (ref 0.3–1)
ABSOLUTE NEUTROPHIL COUNT (OHS): 4.13 K/UL (ref 1.8–7.7)
ALBUMIN SERPL BCP-MCNC: 4.3 G/DL (ref 3.5–5.2)
ALP SERPL-CCNC: 56 UNIT/L (ref 40–150)
ALT SERPL W/O P-5'-P-CCNC: 16 UNIT/L (ref 10–44)
ANION GAP (OHS): 10 MMOL/L (ref 8–16)
AST SERPL-CCNC: 20 UNIT/L (ref 11–45)
BACTERIA #/AREA URNS AUTO: NORMAL /HPF
BASOPHILS # BLD AUTO: 0.02 K/UL
BASOPHILS NFR BLD AUTO: 0.3 %
BILIRUB SERPL-MCNC: 0.9 MG/DL (ref 0.1–1)
BILIRUB UR QL STRIP.AUTO: NEGATIVE
BNP SERPL-MCNC: <10 PG/ML (ref 0–99)
BUN SERPL-MCNC: 29 MG/DL (ref 6–30)
BUN SERPL-MCNC: 30 MG/DL (ref 8–23)
CALCIUM SERPL-MCNC: 9.5 MG/DL (ref 8.7–10.5)
CHLORIDE SERPL-SCNC: 103 MMOL/L (ref 95–110)
CHLORIDE SERPL-SCNC: 105 MMOL/L (ref 95–110)
CLARITY UR: CLEAR
CO2 SERPL-SCNC: 25 MMOL/L (ref 23–29)
COLOR UR AUTO: ABNORMAL
CREAT SERPL-MCNC: 1 MG/DL (ref 0.5–1.4)
CREAT SERPL-MCNC: 1.1 MG/DL (ref 0.5–1.4)
ERYTHROCYTE [DISTWIDTH] IN BLOOD BY AUTOMATED COUNT: 12.5 % (ref 11.5–14.5)
GFR SERPLBLD CREATININE-BSD FMLA CKD-EPI: >60 ML/MIN/1.73/M2
GLUCOSE SERPL-MCNC: 223 MG/DL (ref 70–110)
GLUCOSE SERPL-MCNC: 239 MG/DL (ref 70–110)
GLUCOSE UR QL STRIP: ABNORMAL
HCT VFR BLD AUTO: 45.2 % (ref 40–54)
HCT VFR BLD CALC: 44 %PCV (ref 36–54)
HCV AB SERPL QL IA: NORMAL
HGB BLD-MCNC: 15.9 GM/DL (ref 14–18)
HGB UR QL STRIP: NEGATIVE
HIV 1+2 AB+HIV1 P24 AG SERPL QL IA: NORMAL
HOLD SPECIMEN: NORMAL
IMM GRANULOCYTES # BLD AUTO: 0.01 K/UL (ref 0–0.04)
IMM GRANULOCYTES NFR BLD AUTO: 0.2 % (ref 0–0.5)
KETONES UR QL STRIP: ABNORMAL
LEUKOCYTE ESTERASE UR QL STRIP: NEGATIVE
LIPASE SERPL-CCNC: 25 U/L (ref 4–60)
LYMPHOCYTES # BLD AUTO: 1.53 K/UL (ref 1–4.8)
MCH RBC QN AUTO: 31.5 PG (ref 27–31)
MCHC RBC AUTO-ENTMCNC: 35.2 G/DL (ref 32–36)
MCV RBC AUTO: 90 FL (ref 82–98)
MICROSCOPIC COMMENT: NORMAL
NITRITE UR QL STRIP: NEGATIVE
NUCLEATED RBC (/100WBC) (OHS): 0 /100 WBC
OHS QRS DURATION: 112 MS
OHS QTC CALCULATION: 412 MS
PH UR STRIP: 6 [PH]
PLATELET # BLD AUTO: 177 K/UL (ref 150–450)
PMV BLD AUTO: 9.8 FL (ref 9.2–12.9)
POC IONIZED CALCIUM: 1.17 MMOL/L (ref 1.06–1.42)
POC TCO2 (MEASURED): 26 MMOL/L (ref 23–29)
POTASSIUM BLD-SCNC: 4.5 MMOL/L (ref 3.5–5.1)
POTASSIUM SERPL-SCNC: 4.7 MMOL/L (ref 3.5–5.1)
PROT SERPL-MCNC: 7.1 GM/DL (ref 6–8.4)
PROT UR QL STRIP: NEGATIVE
RBC # BLD AUTO: 5.05 M/UL (ref 4.6–6.2)
RBC #/AREA URNS AUTO: 1 /HPF (ref 0–4)
RELATIVE EOSINOPHIL (OHS): 2.1 %
RELATIVE LYMPHOCYTE (OHS): 24.3 % (ref 18–48)
RELATIVE MONOCYTE (OHS): 7.5 % (ref 4–15)
RELATIVE NEUTROPHIL (OHS): 65.6 % (ref 38–73)
SAMPLE: ABNORMAL
SODIUM BLD-SCNC: 137 MMOL/L (ref 136–145)
SODIUM SERPL-SCNC: 138 MMOL/L (ref 136–145)
SP GR UR STRIP: 1.03
TROPONIN I SERPL HS-MCNC: <3 NG/L
UROBILINOGEN UR STRIP-ACNC: NEGATIVE EU/DL
WBC # BLD AUTO: 6.29 K/UL (ref 3.9–12.7)
WBC #/AREA URNS AUTO: <1 /HPF (ref 0–5)
YEAST UR QL AUTO: NORMAL /HPF

## 2025-04-25 PROCEDURE — 96374 THER/PROPH/DIAG INJ IV PUSH: CPT | Mod: 59

## 2025-04-25 PROCEDURE — 81001 URINALYSIS AUTO W/SCOPE: CPT

## 2025-04-25 PROCEDURE — 83690 ASSAY OF LIPASE: CPT

## 2025-04-25 PROCEDURE — 63600175 PHARM REV CODE 636 W HCPCS

## 2025-04-25 PROCEDURE — 85025 COMPLETE CBC W/AUTO DIFF WBC: CPT

## 2025-04-25 PROCEDURE — 80053 COMPREHEN METABOLIC PANEL: CPT

## 2025-04-25 PROCEDURE — 93005 ELECTROCARDIOGRAM TRACING: CPT

## 2025-04-25 PROCEDURE — 25500020 PHARM REV CODE 255: Performed by: STUDENT IN AN ORGANIZED HEALTH CARE EDUCATION/TRAINING PROGRAM

## 2025-04-25 PROCEDURE — 93010 ELECTROCARDIOGRAM REPORT: CPT | Mod: ,,, | Performed by: STUDENT IN AN ORGANIZED HEALTH CARE EDUCATION/TRAINING PROGRAM

## 2025-04-25 PROCEDURE — 83880 ASSAY OF NATRIURETIC PEPTIDE: CPT

## 2025-04-25 PROCEDURE — 84484 ASSAY OF TROPONIN QUANT: CPT

## 2025-04-25 PROCEDURE — 80047 BASIC METABLC PNL IONIZED CA: CPT

## 2025-04-25 PROCEDURE — 63600175 PHARM REV CODE 636 W HCPCS: Performed by: STUDENT IN AN ORGANIZED HEALTH CARE EDUCATION/TRAINING PROGRAM

## 2025-04-25 PROCEDURE — 99285 EMERGENCY DEPT VISIT HI MDM: CPT | Mod: 25

## 2025-04-25 PROCEDURE — 82330 ASSAY OF CALCIUM: CPT | Mod: 59

## 2025-04-25 PROCEDURE — 87389 HIV-1 AG W/HIV-1&-2 AB AG IA: CPT | Performed by: PHYSICIAN ASSISTANT

## 2025-04-25 PROCEDURE — 86803 HEPATITIS C AB TEST: CPT | Performed by: PHYSICIAN ASSISTANT

## 2025-04-25 PROCEDURE — 96375 TX/PRO/DX INJ NEW DRUG ADDON: CPT

## 2025-04-25 RX ORDER — ONDANSETRON HYDROCHLORIDE 2 MG/ML
4 INJECTION, SOLUTION INTRAVENOUS
Status: COMPLETED | OUTPATIENT
Start: 2025-04-25 | End: 2025-04-25

## 2025-04-25 RX ORDER — PANTOPRAZOLE SODIUM 20 MG/1
20 TABLET, DELAYED RELEASE ORAL DAILY
Qty: 30 TABLET | Refills: 0 | Status: SHIPPED | OUTPATIENT
Start: 2025-04-25

## 2025-04-25 RX ORDER — MORPHINE SULFATE 4 MG/ML
4 INJECTION, SOLUTION INTRAMUSCULAR; INTRAVENOUS
Refills: 0 | Status: COMPLETED | OUTPATIENT
Start: 2025-04-25 | End: 2025-04-25

## 2025-04-25 RX ORDER — HYDROMORPHONE HYDROCHLORIDE 1 MG/ML
1 INJECTION, SOLUTION INTRAMUSCULAR; INTRAVENOUS; SUBCUTANEOUS
Refills: 0 | Status: COMPLETED | OUTPATIENT
Start: 2025-04-25 | End: 2025-04-25

## 2025-04-25 RX ADMIN — IOHEXOL 100 ML: 350 INJECTION, SOLUTION INTRAVENOUS at 06:04

## 2025-04-25 RX ADMIN — ONDANSETRON 4 MG: 2 INJECTION INTRAMUSCULAR; INTRAVENOUS at 05:04

## 2025-04-25 RX ADMIN — MORPHINE SULFATE 4 MG: 4 INJECTION INTRAVENOUS at 04:04

## 2025-04-25 RX ADMIN — HYDROMORPHONE HYDROCHLORIDE 1 MG: 1 INJECTION, SOLUTION INTRAMUSCULAR; INTRAVENOUS; SUBCUTANEOUS at 06:04

## 2025-04-25 NOTE — PROVIDER PROGRESS NOTES - EMERGENCY DEPT.
Encounter Date: 4/25/2025    ED Physician Progress Notes        Physician Note:   ED Resident HAND-OFF NOTE:  7:03 AM 4/25/2025  Luís Torres is a 65 y.o. male who presented to the ED on 4/25/2025 and has been managed by  and Dr. Landin, who reports patient C/O RUQ pain. I assumed care of patient from off-going ED physician team at 7:03 AM pending CT a/p.    ______________________  Nina Gomez DO  Emergency Medicine Resident  7:03 AM 4/25/2025      UPDATE:   CT abdomen pelvis with biliary sludge without evidence of ductal dilation or acute cholecystitis, and mild duodenal wall thickening possibly representing inflammatory or infectious duodenitis.   On reevaluation, patient reports he is feeling much better and pain has resolved. I discussed all findings on imaging with patient and his wife at bedside, plan for discharge home, referral and follow up with gastroenterology, prescription for PPI, supportive care, and strict return precautions and they expressed understanding and agreement.     Final diagnoses:  [R10.9] Abdominal pain  [R11.14] Bilious vomiting with nausea (Primary)  [K29.80] Duodenitis

## 2025-04-25 NOTE — ED NOTES
Assumed care of the patient. Report received from JENN Gonzalez. Pt on continuous cardiac monitoring, continuous pulse oximetry, and automatic BP cuff cycling Q30min. Pt in hospital gown, side rails up X2, bed low and locked, and call light is placed within reach. Wife at bedside at this time. Pt denies any complaints or needs.

## 2025-04-25 NOTE — ED PROVIDER NOTES
Encounter Date: 4/25/2025       History     Chief Complaint   Patient presents with    Abdominal Pain     Patient reports right sided abdominal pain that began 930 pm. Patient reports it has been worsening and states that he had one episode of vomiting prior to arrival.     Patient was a 65-year-old male with past medical history of hypertension and type 2 diabetes that presents to emergency department with abdominal pain.  Patient states that he began having abdominal pain and proximally 4 or 5 hours ago.  It has progressively worsened throughout this time resulting in vomiting.  States that his vomit was nonbilious nonbloody.  Denies radiation to his chest or to his back.  Take any antiemetics prior to arrival.  States that he was never had sensation like this before and denies any abdominal surgeries.        Review of patient's allergies indicates:   Allergen Reactions    No known drug allergies      History reviewed. No pertinent past medical history.  Past Surgical History:   Procedure Laterality Date    ADENOIDECTOMY  1965    CATARACT EXTRACTION Left     COLONOSCOPY N/A 08/30/2024    Procedure: COLONOSCOPY;  Surgeon: Chris Prieto MD;  Location: [facility] ENDOSCOPY;  Service: Endoscopy;  Laterality: N/A;  Ref by Dr RODOLFO Phelan, PEG, portal - PC  8/21/24- lv/portal for pc-- pt returned call, pc complete. DBM    EYE SURGERY  05/2020    Cataract removal R eye    knee replacement Left     TONSILLECTOMY  1965     Family History   Problem Relation Name Age of Onset    Diabetes Mother Ivon     Heart disease Mother Ivon     Hypertension Mother Ivon     Vision loss Mother Ivon     Heart disease Father Mitzi      Social History[1]      Physical Exam     Initial Vitals [04/25/25 0422]   BP Pulse Resp Temp SpO2   (!) 228/102 (!) 55 18 97.9 °F (36.6 °C) 96 %      MAP       --         Physical Exam    Nursing note and vitals reviewed.  Constitutional: He is not diaphoretic. He appears distressed.   HENT:   Head:  Normocephalic.   Right Ear: External ear normal.   Left Ear: External ear normal.   Nose: Nose normal. Mouth/Throat: Oropharynx is clear and moist.   Eyes: Conjunctivae are normal.   Cardiovascular:  Normal rate.           Pulmonary/Chest: Breath sounds normal. No respiratory distress. He has no wheezes.   Abdominal: Abdomen is soft. He exhibits distension. There is abdominal tenderness (RUQ).   Musculoskeletal:         General: No edema.     Neurological: He is alert and oriented to person, place, and time. He has normal strength. No cranial nerve deficit.   Skin: Skin is warm. Capillary refill takes less than 2 seconds.   Psychiatric: He has a normal mood and affect.         ED Course   Procedures  Labs Reviewed   CBC WITH DIFFERENTIAL - Abnormal       Result Value    WBC 6.29      RBC 5.05      HGB 15.9      HCT 45.2      MCV 90      MCH 31.5 (*)     MCHC 35.2      RDW 12.5      Platelet Count 177      MPV 9.8      Nucleated RBC 0      Neut % 65.6      Lymph % 24.3      Mono % 7.5      Eos % 2.1      Basophil % 0.3      Imm Grans % 0.2      Neut # 4.13      Lymph # 1.53      Mono # 0.47      Eos # 0.13      Baso # 0.02      Imm Grans # 0.01     HEPATITIS C ANTIBODY - Normal    Hep C Ab Interp Non-Reactive     HIV 1 / 2 ANTIBODY - Normal    HIV 1/2 Ag/Ab Non-Reactive     TROPONIN I HIGH SENSITIVITY - Normal    Troponin High Sensitive <3     B-TYPE NATRIURETIC PEPTIDE - Normal    BNP <10     CBC W/ AUTO DIFFERENTIAL    Narrative:     The following orders were created for panel order CBC W/ AUTO DIFFERENTIAL.  Procedure                               Abnormality         Status                     ---------                               -----------         ------                     CBC with Differential[[id number]]       Abnormal            Final result                 Please view results for these tests on the individual orders.   HEP C VIRUS HOLD SPECIMEN   COMPREHENSIVE METABOLIC PANEL   LIPASE   URINALYSIS,  REFLEX TO URINE CULTURE   GREY TOP URINE HOLD   ISTAT CHEM8          Imaging Results              CT Abdomen Pelvis With IV Contrast NO Oral Contrast (In process)  Result time 04/25/25 07:26:41                     US Abdomen Limited (Final result)  Result time 04/25/25 06:45:57      Final result by Tyler Zamora MD (04/25/25 06:45:57)                   Impression:      Biliary sludge without evidence of gallbladder inflammatory change/acute cholecystitis.    Electronically signed by resident: Carlos Prajapati  Date:    04/25/2025  Time:    06:28    Electronically signed by: Tyler Zamora MD  Date:    04/25/2025  Time:    06:45               Narrative:    EXAMINATION:  US ABDOMEN LIMITED    CLINICAL HISTORY:  RUQ pain with vomiting;.    TECHNIQUE:  Limited ultrasound of the right upper quadrant of the abdomen pancreas, gallbladder, and bile ducts was performed.    COMPARISON:  None.    FINDINGS:  Gallbladder: Echogenic material within the gallbladder likely representing biliary sludge.  No calculi, wall thickening, or pericholecystic fluid.  No wall hyperemia.  Negative sonographic Miles's sign.    Biliary system: The common duct is not dilated, measuring 4 mm.  No intrahepatic ductal dilatation.    Pancreas: Obscured by overlying bowel gas.                                       Medications   morphine injection 4 mg (4 mg Intravenous Given 4/25/25 0457)   ondansetron injection 4 mg (4 mg Intravenous Given 4/25/25 0508)   HYDROmorphone injection 1 mg (1 mg Intravenous Given 4/25/25 0606)   iohexoL (OMNIPAQUE 350) injection 100 mL (100 mLs Intravenous Given 4/25/25 0650)     Medical Decision Making  Patient was a 65-year-old male with past medical history of hypertension and type 2 diabetes that presents to emergency department with abdominal pain.    On initial evaluation patient was vitals are significant for hypertension otherwise within normal limits.  Patient was cooperative and speaking in full sentences.  He  was in mild distress secondary to pain.    Differential for patient's presentation includes intra-abdominal pathologies such as gastritis, cholelithiasis, small bowel obstruction.  Also considering cardiac causes such as ACS given history of diabetes this could possibly be atypical presentation.    At time of sign-out patient was pending workup, done resolution and imaging read by Radiology.  Final disposition pending.  Oncoming ER team we will follow up.    Amount and/or Complexity of Data Reviewed  Labs: ordered.  Radiology: ordered.    Risk  Prescription drug management.                                      Clinical Impression:  Final diagnoses:  [R10.9] Abdominal pain  [R11.14] Bilious vomiting with nausea (Primary)                     [1]   Social History  Tobacco Use    Smoking status: Never    Smokeless tobacco: Never   Substance Use Topics    Alcohol use: Yes     Alcohol/week: 6.0 standard drinks of alcohol     Types: 4 Cans of beer, 2 Shots of liquor per week    Drug use: No        Gisela Guadalupe DO  Resident  04/25/25 5582

## 2025-04-25 NOTE — ED TRIAGE NOTES
Luís Torres, a 65 y.o. male presents to the ED w/ complaint of abd pain. Onset of 9pm last night, pt also endorses nausea and vomiting    Triage note:  Chief Complaint   Patient presents with    Abdominal Pain     Patient reports right sided abdominal pain that began 930 pm. Patient reports it has been worsening and states that he had one episode of vomiting prior to arrival.     Review of patient's allergies indicates:   Allergen Reactions    No known drug allergies      History reviewed. No pertinent past medical history.

## 2025-04-25 NOTE — ED NOTES
I-STAT Chem-8+ Results:   Value Reference Range   Sodium 137 136-145 mmol/L   Potassium  4.5 3.5-5.1 mmol/L   Chloride 105  mmol/L   Ionized Calcium 1.17 1.06-1.42 mmol/L   CO2 (measured) 26 23-29 mmol/L   Glucose 239  mg/dL   BUN 29 6-30 mg/dL   Creatinine 1.1 0.5-1.4 mg/dL   Hematocrit 44 36-54%

## 2025-04-28 ENCOUNTER — HOSPITAL ENCOUNTER (INPATIENT)
Facility: HOSPITAL | Age: 65
LOS: 2 days | Discharge: HOME | DRG: 418 | End: 2025-05-01
Attending: EMERGENCY MEDICINE | Admitting: STUDENT IN AN ORGANIZED HEALTH CARE EDUCATION/TRAINING PROGRAM
Payer: MEDICARE

## 2025-04-28 DIAGNOSIS — R10.9 ABDOMINAL PAIN: ICD-10-CM

## 2025-04-28 DIAGNOSIS — R07.9 CHEST PAIN: ICD-10-CM

## 2025-04-28 DIAGNOSIS — R10.11 RUQ ABDOMINAL PAIN: Primary | ICD-10-CM

## 2025-04-28 DIAGNOSIS — K80.00 CALCULUS OF GALLBLADDER WITH ACUTE CHOLECYSTITIS WITHOUT OBSTRUCTION: ICD-10-CM

## 2025-04-28 DIAGNOSIS — J96.01 ACUTE HYPOXIC RESPIRATORY FAILURE: ICD-10-CM

## 2025-04-28 PROBLEM — E78.5 HYPERLIPIDEMIA: Status: ACTIVE | Noted: 2025-04-28

## 2025-04-28 PROBLEM — E11.9 DIABETES: Status: ACTIVE | Noted: 2025-04-28

## 2025-04-28 PROBLEM — I10 HYPERTENSION: Status: ACTIVE | Noted: 2025-04-28

## 2025-04-28 LAB
ABSOLUTE EOSINOPHIL (OHS): 0 K/UL
ABSOLUTE MONOCYTE (OHS): 1.03 K/UL (ref 0.3–1)
ABSOLUTE NEUTROPHIL COUNT (OHS): 11.03 K/UL (ref 1.8–7.7)
ALBUMIN SERPL BCP-MCNC: 2.6 G/DL (ref 3.5–5.2)
ALP SERPL-CCNC: 62 UNIT/L (ref 40–150)
ALT SERPL W/O P-5'-P-CCNC: 12 UNIT/L (ref 10–44)
ANION GAP (OHS): 13 MMOL/L (ref 8–16)
AST SERPL-CCNC: 18 UNIT/L (ref 11–45)
BASOPHILS # BLD AUTO: 0.02 K/UL
BASOPHILS NFR BLD AUTO: 0.2 %
BILIRUB SERPL-MCNC: 0.8 MG/DL (ref 0.1–1)
BUN SERPL-MCNC: 42 MG/DL (ref 8–23)
CALCIUM SERPL-MCNC: 9.1 MG/DL (ref 8.7–10.5)
CHLORIDE SERPL-SCNC: 96 MMOL/L (ref 95–110)
CO2 SERPL-SCNC: 21 MMOL/L (ref 23–29)
CREAT SERPL-MCNC: 1.1 MG/DL (ref 0.5–1.4)
ERYTHROCYTE [DISTWIDTH] IN BLOOD BY AUTOMATED COUNT: 12.9 % (ref 11.5–14.5)
GFR SERPLBLD CREATININE-BSD FMLA CKD-EPI: >60 ML/MIN/1.73/M2
GLUCOSE SERPL-MCNC: 200 MG/DL (ref 70–110)
HCT VFR BLD AUTO: 44.4 % (ref 40–54)
HGB BLD-MCNC: 15 GM/DL (ref 14–18)
IMM GRANULOCYTES # BLD AUTO: 0.09 K/UL (ref 0–0.04)
IMM GRANULOCYTES NFR BLD AUTO: 0.7 % (ref 0–0.5)
LIPASE SERPL-CCNC: 11 U/L (ref 4–60)
LYMPHOCYTES # BLD AUTO: 0.38 K/UL (ref 1–4.8)
MCH RBC QN AUTO: 30.2 PG (ref 27–31)
MCHC RBC AUTO-ENTMCNC: 33.8 G/DL (ref 32–36)
MCV RBC AUTO: 90 FL (ref 82–98)
NUCLEATED RBC (/100WBC) (OHS): 0 /100 WBC
OHS QRS DURATION: 114 MS
OHS QTC CALCULATION: 447 MS
PLATELET # BLD AUTO: 113 K/UL (ref 150–450)
PLATELET BLD QL SMEAR: NORMAL
PMV BLD AUTO: 10.2 FL (ref 9.2–12.9)
POCT GLUCOSE: 172 MG/DL (ref 70–110)
POTASSIUM SERPL-SCNC: 3.7 MMOL/L (ref 3.5–5.1)
PROT SERPL-MCNC: 6.8 GM/DL (ref 6–8.4)
RBC # BLD AUTO: 4.96 M/UL (ref 4.6–6.2)
RELATIVE EOSINOPHIL (OHS): 0 %
RELATIVE LYMPHOCYTE (OHS): 3 % (ref 18–48)
RELATIVE MONOCYTE (OHS): 8.2 % (ref 4–15)
RELATIVE NEUTROPHIL (OHS): 87.9 % (ref 38–73)
SODIUM SERPL-SCNC: 130 MMOL/L (ref 136–145)
TROPONIN I SERPL HS-MCNC: 3 NG/L
WBC # BLD AUTO: 12.55 K/UL (ref 3.9–12.7)

## 2025-04-28 PROCEDURE — 80053 COMPREHEN METABOLIC PANEL: CPT | Performed by: EMERGENCY MEDICINE

## 2025-04-28 PROCEDURE — 99285 EMERGENCY DEPT VISIT HI MDM: CPT | Mod: 25

## 2025-04-28 PROCEDURE — G0378 HOSPITAL OBSERVATION PER HR: HCPCS

## 2025-04-28 PROCEDURE — 83690 ASSAY OF LIPASE: CPT | Performed by: EMERGENCY MEDICINE

## 2025-04-28 PROCEDURE — 96372 THER/PROPH/DIAG INJ SC/IM: CPT | Performed by: STUDENT IN AN ORGANIZED HEALTH CARE EDUCATION/TRAINING PROGRAM

## 2025-04-28 PROCEDURE — 96361 HYDRATE IV INFUSION ADD-ON: CPT

## 2025-04-28 PROCEDURE — 85025 COMPLETE CBC W/AUTO DIFF WBC: CPT | Performed by: EMERGENCY MEDICINE

## 2025-04-28 PROCEDURE — 96374 THER/PROPH/DIAG INJ IV PUSH: CPT

## 2025-04-28 PROCEDURE — 96375 TX/PRO/DX INJ NEW DRUG ADDON: CPT

## 2025-04-28 PROCEDURE — 93010 ELECTROCARDIOGRAM REPORT: CPT | Mod: ,,, | Performed by: INTERNAL MEDICINE

## 2025-04-28 PROCEDURE — 25000003 PHARM REV CODE 250: Performed by: EMERGENCY MEDICINE

## 2025-04-28 PROCEDURE — 63600175 PHARM REV CODE 636 W HCPCS: Mod: JZ,TB | Performed by: EMERGENCY MEDICINE

## 2025-04-28 PROCEDURE — 93005 ELECTROCARDIOGRAM TRACING: CPT

## 2025-04-28 PROCEDURE — 87040 BLOOD CULTURE FOR BACTERIA: CPT | Mod: 91 | Performed by: STUDENT IN AN ORGANIZED HEALTH CARE EDUCATION/TRAINING PROGRAM

## 2025-04-28 PROCEDURE — 63600175 PHARM REV CODE 636 W HCPCS: Performed by: STUDENT IN AN ORGANIZED HEALTH CARE EDUCATION/TRAINING PROGRAM

## 2025-04-28 PROCEDURE — 82962 GLUCOSE BLOOD TEST: CPT

## 2025-04-28 PROCEDURE — 84484 ASSAY OF TROPONIN QUANT: CPT | Performed by: STUDENT IN AN ORGANIZED HEALTH CARE EDUCATION/TRAINING PROGRAM

## 2025-04-28 PROCEDURE — 25000003 PHARM REV CODE 250: Performed by: STUDENT IN AN ORGANIZED HEALTH CARE EDUCATION/TRAINING PROGRAM

## 2025-04-28 RX ORDER — KETOROLAC TROMETHAMINE 30 MG/ML
10 INJECTION, SOLUTION INTRAMUSCULAR; INTRAVENOUS
Status: COMPLETED | OUTPATIENT
Start: 2025-04-28 | End: 2025-04-28

## 2025-04-28 RX ORDER — ACETAMINOPHEN 325 MG/1
650 TABLET ORAL EVERY 4 HOURS PRN
Status: DISCONTINUED | OUTPATIENT
Start: 2025-04-28 | End: 2025-04-30

## 2025-04-28 RX ORDER — GLUCAGON 1 MG
1 KIT INJECTION
Status: DISCONTINUED | OUTPATIENT
Start: 2025-04-28 | End: 2025-05-01 | Stop reason: HOSPADM

## 2025-04-28 RX ORDER — SODIUM CHLORIDE 9 MG/ML
1000 INJECTION, SOLUTION INTRAVENOUS
Status: DISCONTINUED | OUTPATIENT
Start: 2025-04-28 | End: 2025-04-28

## 2025-04-28 RX ORDER — IBUPROFEN 200 MG
24 TABLET ORAL
Status: DISCONTINUED | OUTPATIENT
Start: 2025-04-28 | End: 2025-05-01 | Stop reason: HOSPADM

## 2025-04-28 RX ORDER — ENOXAPARIN SODIUM 100 MG/ML
40 INJECTION SUBCUTANEOUS EVERY 12 HOURS
Status: DISCONTINUED | OUTPATIENT
Start: 2025-04-28 | End: 2025-05-01 | Stop reason: HOSPADM

## 2025-04-28 RX ORDER — SODIUM CHLORIDE 0.9 % (FLUSH) 0.9 %
10 SYRINGE (ML) INJECTION EVERY 12 HOURS PRN
Status: DISCONTINUED | OUTPATIENT
Start: 2025-04-28 | End: 2025-05-01 | Stop reason: HOSPADM

## 2025-04-28 RX ORDER — OXYCODONE HYDROCHLORIDE 5 MG/1
5 TABLET ORAL EVERY 6 HOURS PRN
Refills: 0 | Status: DISCONTINUED | OUTPATIENT
Start: 2025-04-28 | End: 2025-05-01 | Stop reason: HOSPADM

## 2025-04-28 RX ORDER — INSULIN ASPART 100 [IU]/ML
0-10 INJECTION, SOLUTION INTRAVENOUS; SUBCUTANEOUS
Status: DISCONTINUED | OUTPATIENT
Start: 2025-04-28 | End: 2025-05-01 | Stop reason: HOSPADM

## 2025-04-28 RX ORDER — ATORVASTATIN CALCIUM 20 MG/1
20 TABLET, FILM COATED ORAL DAILY
Status: DISCONTINUED | OUTPATIENT
Start: 2025-04-29 | End: 2025-05-01 | Stop reason: HOSPADM

## 2025-04-28 RX ORDER — ACETAMINOPHEN 325 MG/1
650 TABLET ORAL EVERY 8 HOURS PRN
Status: DISCONTINUED | OUTPATIENT
Start: 2025-04-28 | End: 2025-04-30

## 2025-04-28 RX ORDER — ONDANSETRON HYDROCHLORIDE 2 MG/ML
4 INJECTION, SOLUTION INTRAVENOUS EVERY 8 HOURS PRN
Status: DISCONTINUED | OUTPATIENT
Start: 2025-04-28 | End: 2025-05-01 | Stop reason: HOSPADM

## 2025-04-28 RX ORDER — MORPHINE SULFATE 4 MG/ML
4 INJECTION, SOLUTION INTRAMUSCULAR; INTRAVENOUS EVERY 4 HOURS PRN
Refills: 0 | Status: DISCONTINUED | OUTPATIENT
Start: 2025-04-28 | End: 2025-04-29

## 2025-04-28 RX ORDER — IBUPROFEN 200 MG
16 TABLET ORAL
Status: DISCONTINUED | OUTPATIENT
Start: 2025-04-28 | End: 2025-05-01 | Stop reason: HOSPADM

## 2025-04-28 RX ORDER — ALUMINUM HYDROXIDE, MAGNESIUM HYDROXIDE, AND SIMETHICONE 1200; 120; 1200 MG/30ML; MG/30ML; MG/30ML
30 SUSPENSION ORAL 4 TIMES DAILY PRN
Status: DISCONTINUED | OUTPATIENT
Start: 2025-04-28 | End: 2025-05-01 | Stop reason: HOSPADM

## 2025-04-28 RX ORDER — POLYETHYLENE GLYCOL 3350 17 G/17G
17 POWDER, FOR SOLUTION ORAL DAILY
Status: DISCONTINUED | OUTPATIENT
Start: 2025-04-29 | End: 2025-05-01 | Stop reason: HOSPADM

## 2025-04-28 RX ORDER — NALOXONE HCL 0.4 MG/ML
0.02 VIAL (ML) INJECTION
Status: DISCONTINUED | OUTPATIENT
Start: 2025-04-28 | End: 2025-05-01 | Stop reason: HOSPADM

## 2025-04-28 RX ORDER — PANTOPRAZOLE SODIUM 20 MG/1
20 TABLET, DELAYED RELEASE ORAL DAILY
Status: DISCONTINUED | OUTPATIENT
Start: 2025-04-29 | End: 2025-05-01 | Stop reason: HOSPADM

## 2025-04-28 RX ORDER — CEFTRIAXONE 1 G/1
1 INJECTION, POWDER, FOR SOLUTION INTRAMUSCULAR; INTRAVENOUS
Status: DISCONTINUED | OUTPATIENT
Start: 2025-04-28 | End: 2025-04-29

## 2025-04-28 RX ORDER — IBUPROFEN 400 MG/1
400 TABLET ORAL ONCE AS NEEDED
Status: DISCONTINUED | OUTPATIENT
Start: 2025-04-28 | End: 2025-05-01 | Stop reason: HOSPADM

## 2025-04-28 RX ADMIN — ENOXAPARIN SODIUM 40 MG: 40 INJECTION SUBCUTANEOUS at 08:04

## 2025-04-28 RX ADMIN — KETOROLAC TROMETHAMINE 10 MG: 30 INJECTION, SOLUTION INTRAMUSCULAR; INTRAVENOUS at 01:04

## 2025-04-28 RX ADMIN — SODIUM CHLORIDE 1000 ML: 0.9 INJECTION, SOLUTION INTRAVENOUS at 01:04

## 2025-04-28 RX ADMIN — ACETAMINOPHEN 650 MG: 325 TABLET ORAL at 08:04

## 2025-04-28 RX ADMIN — CEFTRIAXONE 1 G: 1 INJECTION, POWDER, FOR SOLUTION INTRAMUSCULAR; INTRAVENOUS at 06:04

## 2025-04-28 NOTE — PROGRESS NOTES
Pharmacist Dose Adjustment Note    Luís Torres is a 65 y.o. male being treated with enoxaparin 40 mg ever 24 hours for VTE ppx     Patient Data:    Vital Signs (Most Recent):  Temp: 97.7 °F (36.5 °C) (04/28/25 1553)  Pulse: 94 (04/28/25 1553)  Resp: 18 (04/28/25 1553)  BP: 124/77 (04/28/25 1553)  SpO2: 95 % (04/28/25 1553) Vital Signs (72h Range):  Temp:  [97.7 °F (36.5 °C)-98.4 °F (36.9 °C)]   Pulse:  []   Resp:  [18]   BP: (109-124)/(58-77)   SpO2:  [95 %]      Recent Labs   Lab 04/25/25  0532 04/28/25  1321   CREATININE 1.0 1.1     Serum creatinine: 1.1 mg/dL 04/28/25 1321  Estimated creatinine clearance: 90.9 mL/min    Adjusted to  Enoxaparin 40 mg every 12 hours,     Pharmacist's Name: Nuria Jose  Pharmacist's Extension: [phone]

## 2025-04-28 NOTE — ED TRIAGE NOTES
Luís Torres, a 65 y.o. male presents to the ED w/ complaint of RUQ and LUQ pain, reports initial onset this past Friday. Pt reports he's been running fevers over the weekend.

## 2025-04-28 NOTE — ASSESSMENT & PLAN NOTE
Patient's FSGs are controlled on current medication regimen.  Last A1c reviewed-   Lab Results   Component Value Date    HGBA1C 8.5 (H) 01/08/2025     Most recent fingerstick glucose reviewed-   Recent Labs   Lab 04/28/25  2017   POCTGLUCOSE 172*     Current correctional scale  Medium  Maintain anti-hyperglycemic dose as follows-   Antihyperglycemics (From admission, onward)      Start     Stop Route Frequency Ordered    04/28/25 1827  insulin aspart U-100 pen 0-10 Units         -- SubQ Before meals & nightly PRN 04/28/25 1727          Hold Oral hypoglycemics while patient is in the hospital.

## 2025-04-28 NOTE — ED PROVIDER NOTES
Chief complaint:  Abdominal Pain (Seen here on Friday  for r upper abd, sent from dr office)      Source of information:  patient, spouse, old chart    HPI:  Luís Torres is a 65 y.o. male presenting with ongoing pain in the abdomen.  The patient states this started about 4-5 days ago.  Initially was more severe when he was evaluated in the emergency department  with ultrasound and CAT scan.  At that time he did have several episodes of vomiting.  Was given pain medication in the emergency department and felt better.  However over the past 48 hours pain has started to increase again.  No further nausea or vomiting.  His wife reports fevers of 100.1 at home.  The patient does report having some nonbloody diarrhea.  He saw Gastroenterology, Dr. Luís Landaverde earlier today and was directed back to the emergency department due to concern that he needed a HIDA scan.  No prior history of gallbladder, pancreatic disease.  No abdominal surgery.    ROS: As per HPI    Review of patient's allergies indicates:   Allergen Reactions    No known drug allergies        Medications Ordered Prior to Encounter[1]    PMH:  As per HPI and below:  History reviewed. No pertinent past medical history.  Past Surgical History:   Procedure Laterality Date    ADENOIDECTOMY  1965    CATARACT EXTRACTION Left     COLONOSCOPY N/A 08/30/2024    Procedure: COLONOSCOPY;  Surgeon: Chris Prieto MD;  [facility];  Service: Endoscopy;  Laterality: N/A;  Ref by Dr RODOLFO Phelan, PEG, portal -   8/21/24- [facility]/portal for pc-- pt returned call, pc complete. DBM    EYE SURGERY  05/2020    Cataract removal R eye    knee replacement Left     TONSILLECTOMY  1965         Physical Exam:    Vitals:    04/28/25 1553   BP: 124/77   Pulse: 94   Resp: 18   Temp: 97.7 °F (36.5 °C)       General: No acute distress. Well developed. Well nourished.  Eyes: PERRL. EOM intact. no photophobia, no nystagmus  Conjunctivae - no pallor or icterus.   ENT: HEAD:  "Normal - atraumatic. Normal external ears. Normal nose.  No facial asymmetry. Mucous membranes - dry.  Neck: Neck supple.   No JVD.  Cardiovascular: Regular rate and rhythm. Normal S1 and S2. No murmur. No gallop. No rub.  2+ peripheral pulses  GI: Soft.  Tenderness in the right upper quadrant, even with palpation of the abdominal musculature, however not reproduced by sitting up/"stomach crunch".  Positive Miles sign.  No focal tenderness at McBurney's point.  No CVA tenderness.  Nondistended. No guarding. No rebound. Normal BS.  Musculoskeletal:  Normal weight-bearing and gait No deformities. Normal ROM x4.   Integument: No acute skin rashes. No clubbing or cyanosis  Neurologic: No gross neurological deficits.   Psychiatric: Awake, alert.  Oriented x3.  Normal speech and mentation.        Labs Reviewed   COMPREHENSIVE METABOLIC PANEL - Abnormal       Result Value    Sodium 130 (*)     Potassium 3.7      Chloride 96      CO2 21 (*)     Glucose 200 (*)     BUN 42 (*)     Creatinine 1.1      Calcium 9.1      Protein Total 6.8      Albumin 2.6 (*)     Bilirubin Total 0.8      ALP 62      AST 18      ALT 12      Anion Gap 13      eGFR >60     CBC WITH DIFFERENTIAL - Abnormal    WBC 12.55      RBC 4.96      HGB 15.0      HCT 44.4      MCV 90      MCH 30.2      MCHC 33.8      RDW 12.9      Platelet Count 113 (*)     MPV 10.2      Nucleated RBC 0      Neut % 87.9 (*)     Lymph % 3.0 (*)     Mono % 8.2      Eos % 0.0      Basophil % 0.2      Imm Grans % 0.7 (*)     Neut # 11.03 (*)     Lymph # 0.38 (*)     Mono # 1.03 (*)     Eos # 0.00      Baso # 0.02      Imm Grans # 0.09 (*)    LIPASE - Normal    Lipase Level 11     PLATELET REVIEW - Normal    Platelet Estimate Appears Normal     CBC W/ AUTO DIFFERENTIAL    Narrative:     The following orders were created for panel order CBC W/ AUTO DIFFERENTIAL.                  Procedure                               Abnormality         Status                                     " ---------                               -----------         ------                                     CBC with Differential[[id number]]       Abnormal            Final result                                                 Please view results for these tests on the individual orders.       Medications   sodium chloride 0.9% bolus 1,000 mL 1,000 mL (0 mLs Intravenous Stopped 4/28/25 1436)   ketorolac injection 9.999 mg (9.999 mg Intravenous Given 4/28/25 1309)       Medical Decision Making  Differential diagnosis includes cholecystitis, pancreatitis, choledocholithiasis, musculoskeletal abdominal pain, gastroenteritis    Amount and/or Complexity of Data Reviewed  Independent Historian: spouse  External Data Reviewed: labs, radiology and notes.  Labs: ordered. Decision-making details documented in ED Course.    Risk  Prescription drug management.  Decision regarding hospitalization.          MDM:    65 y.o. male with 4-5 days of right upper quadrant pain.  Reported fever at home but afebrile here.  Recent workup of ultrasound and CAT scan showed gallbladder sludge and some duodenal thickening.  Seen by GI this morning, an due to concern for continued right upper quadrant pain was referred for further evaluation, possibly HIDA scan.  White count is elevated but still within normal limits, although there is now a left shift.  Bilirubin, transaminases, lipase remained normal.  He does have mild hyponatremia which should be improved by the IV fluid bolus.  Given Toradol with significant improvement in pain.  Discussed the case with GI on-call, recommend placement in observation, obtaining a HIDA scan, and consulting biliary service if positive.  Case discussed with the hospitalist service to whom he will be admitted.  Patient, wife at bedside updated with findings and plan of care    Medications   sodium chloride 0.9% bolus 1,000 mL 1,000 mL (0 mLs Intravenous Stopped 4/28/25 1436)   ketorolac injection 9.999 mg (9.999  mg Intravenous Given 4/28/25 1309)       ASSESSMENT:   1. RUQ abdominal pain    2. Abdominal pain               [1]   No current facility-administered medications on file prior to encounter.     Current Outpatient Medications on File Prior to Encounter   Medication Sig Dispense Refill    atorvastatin (LIPITOR) 20 MG tablet Take 1 tablet (20 mg total) by mouth once daily. 90 tablet 3    blood sugar diagnostic (ONETOUCH VERIO TEST STRIPS) Strp 1 strip by Misc.(Non-Drug; Combo Route) route 2 (two) times a day. 200 each 1    empagliflozin (JARDIANCE) 25 mg tablet Take 1 tablet (25 mg total) by mouth once daily. Requesting formulary tier exemption 90 tablet 3    lancets 33 gauge Misc 1 lancet  by Misc.(Non-Drug; Combo Route) route 2 (two) times a day. 200 each 1    lisinopriL (PRINIVIL,ZESTRIL) 20 MG tablet Take 1 tablet (20 mg total) by mouth once daily. 90 tablet 3    metFORMIN (GLUCOPHAGE-XR) 500 MG ER 24hr tablet Take 2 tablets (1,000 mg total) by mouth once daily. 180 tablet 3    pantoprazole (PROTONIX) 20 MG tablet Take 1 tablet (20 mg total) by mouth once daily. 30 tablet 0    tadalafiL (CIALIS) 20 MG Tab Take 1 tablet (20 mg total) by mouth daily as needed. 30 tablet 3        Biju Mchugh II, MD  04/28/25 7110

## 2025-04-28 NOTE — PROVIDER PROGRESS NOTES - EMERGENCY DEPT.
Encounter Date: 4/28/2025    ED Physician Progress Notes         EKG - STEMI Decision  Initial Reading: No STEMI present.

## 2025-04-28 NOTE — ASSESSMENT & PLAN NOTE
Patient's blood pressure range in the last 24 hours was: BP  Min: 109/58  Max: 159/81.The patient's inpatient anti-hypertensive regimen is listed below:  Current Antihypertensives       Plan  - BP is controlled, Holding home amlo

## 2025-04-28 NOTE — FIRST PROVIDER EVALUATION
"Medical screening examination initiated.  I have conducted a focused provider triage encounter, findings are as follows:    Brief history of present illness:  Abdominal Pian    Vitals:    04/28/25 1123   BP: (!) 109/58   Pulse: 108   Resp: 18   Temp: 98.4 °F (36.9 °C)   TempSrc: Oral   SpO2: 95%   Weight: 130.6 kg (288 lb)   Height: 5' 10" (1.778 m)       Pertinent physical exam:  Uncomfortable    Brief workup plan:  Labs ordered    Preliminary workup initiated; this workup will be continued and followed by the physician or advanced practice provider that is assigned to the patient when roomed.  "

## 2025-04-28 NOTE — HPI
65 y.o. male with past medical history of hypertension, diabetes, hyperlipidemia who presented to [facility] ED on 04/28/2025 as directed by GI outpatient for evaluation of abdominal pain.  Patient reports acute onset right and left upper quadrant abdominal pain for the last 4-5 days associated with nausea/several episodes of bilious nonbloody vomiting.  He was evaluated in ED on 04/25/2025.  Ultrasound revealed Biliary sludge without evidence of gallbladder inflammatory change/acute cholecystitis.   CT abdomen revealed Gallbladder distension with biliary sludge.  No biliary ductal dilatation or evidence of acute cholecystitis. Mild duodenal wall thickening hyperattenuation without significant surrounding inflammatory change, nonspecific can be seen in the setting of inflammatory/infectious enteritis .Patient was discharged home on p.o. pain regimen.  However patient reports worsening pain over the last 1-2 days.  Associated with fever T-max 100.1° F. he was seen at GI Dr. Landaverde office this morning and was recommended to present to ED for further evaluation with HIDA scan.  Patient denied previous history of gallbladder/pancreas problems.     During evaluation in the ED, patient is hemodynamically stable.  Labs revealed WBC 12.5, hemoglobin 15, platelets 113.  Sodium 130, potassium 3.7, creatinine 1.1, AST 18, ALT 12, total bilirubin 0.8 .  patient was given 1 L NS, Toradol .ED discussed with GI on-call recommended HIDA scan and consult AES if positive.  Admitted to hospital medicine for further management

## 2025-04-28 NOTE — ASSESSMENT & PLAN NOTE
Patient was admitted with night upper quadrant abdominal pain.  Ultrasound with evidence of biliary distention with gallbladder sludge.  Initiate an empiric ceftriaxone with concern for acute cholecystitis with left shift WBC trend  Obtain HIDA scan.  Keep patient NPO follow up on blood cultures

## 2025-04-29 ENCOUNTER — ANESTHESIA EVENT (OUTPATIENT)
Dept: SURGERY | Facility: HOSPITAL | Age: 65
End: 2025-04-29
Payer: MEDICARE

## 2025-04-29 PROBLEM — K80.00 CHOLELITHIASIS WITH ACUTE CHOLECYSTITIS: Status: ACTIVE | Noted: 2025-04-29

## 2025-04-29 LAB
ABSOLUTE EOSINOPHIL (OHS): 0.01 K/UL
ABSOLUTE MONOCYTE (OHS): 1.28 K/UL (ref 0.3–1)
ABSOLUTE NEUTROPHIL COUNT (OHS): 8.91 K/UL (ref 1.8–7.7)
ALBUMIN SERPL BCP-MCNC: 2.5 G/DL (ref 3.5–5.2)
ALP SERPL-CCNC: 66 UNIT/L (ref 40–150)
ALT SERPL W/O P-5'-P-CCNC: 21 UNIT/L (ref 10–44)
ANION GAP (OHS): 14 MMOL/L (ref 8–16)
AST SERPL-CCNC: 33 UNIT/L (ref 11–45)
BASOPHILS # BLD AUTO: 0.02 K/UL
BASOPHILS NFR BLD AUTO: 0.2 %
BILIRUB SERPL-MCNC: 0.8 MG/DL (ref 0.1–1)
BUN SERPL-MCNC: 42 MG/DL (ref 8–23)
CALCIUM SERPL-MCNC: 8.9 MG/DL (ref 8.7–10.5)
CHLORIDE SERPL-SCNC: 96 MMOL/L (ref 95–110)
CO2 SERPL-SCNC: 21 MMOL/L (ref 23–29)
CREAT SERPL-MCNC: 1 MG/DL (ref 0.5–1.4)
ERYTHROCYTE [DISTWIDTH] IN BLOOD BY AUTOMATED COUNT: 13 % (ref 11.5–14.5)
GFR SERPLBLD CREATININE-BSD FMLA CKD-EPI: >60 ML/MIN/1.73/M2
GLUCOSE SERPL-MCNC: 170 MG/DL (ref 70–110)
HCT VFR BLD AUTO: 39.9 % (ref 40–54)
HGB BLD-MCNC: 13.5 GM/DL (ref 14–18)
IMM GRANULOCYTES # BLD AUTO: 0.04 K/UL (ref 0–0.04)
IMM GRANULOCYTES NFR BLD AUTO: 0.4 % (ref 0–0.5)
LYMPHOCYTES # BLD AUTO: 0.39 K/UL (ref 1–4.8)
MAGNESIUM SERPL-MCNC: 2.5 MG/DL (ref 1.6–2.6)
MCH RBC QN AUTO: 30.8 PG (ref 27–31)
MCHC RBC AUTO-ENTMCNC: 33.8 G/DL (ref 32–36)
MCV RBC AUTO: 91 FL (ref 82–98)
NUCLEATED RBC (/100WBC) (OHS): 0 /100 WBC
PHOSPHATE SERPL-MCNC: 2.1 MG/DL (ref 2.7–4.5)
PLATELET # BLD AUTO: 106 K/UL (ref 150–450)
PMV BLD AUTO: 10.2 FL (ref 9.2–12.9)
POCT GLUCOSE: 188 MG/DL (ref 70–110)
POCT GLUCOSE: 189 MG/DL (ref 70–110)
POTASSIUM SERPL-SCNC: 3.5 MMOL/L (ref 3.5–5.1)
PROT SERPL-MCNC: 6.7 GM/DL (ref 6–8.4)
RBC # BLD AUTO: 4.38 M/UL (ref 4.6–6.2)
RELATIVE EOSINOPHIL (OHS): 0.1 %
RELATIVE LYMPHOCYTE (OHS): 3.7 % (ref 18–48)
RELATIVE MONOCYTE (OHS): 12 % (ref 4–15)
RELATIVE NEUTROPHIL (OHS): 83.6 % (ref 38–73)
SODIUM SERPL-SCNC: 131 MMOL/L (ref 136–145)
WBC # BLD AUTO: 10.65 K/UL (ref 3.9–12.7)

## 2025-04-29 PROCEDURE — 85025 COMPLETE CBC W/AUTO DIFF WBC: CPT | Performed by: STUDENT IN AN ORGANIZED HEALTH CARE EDUCATION/TRAINING PROGRAM

## 2025-04-29 PROCEDURE — 84100 ASSAY OF PHOSPHORUS: CPT | Performed by: STUDENT IN AN ORGANIZED HEALTH CARE EDUCATION/TRAINING PROGRAM

## 2025-04-29 PROCEDURE — A9537 TC99M MEBROFENIN: HCPCS | Performed by: STUDENT IN AN ORGANIZED HEALTH CARE EDUCATION/TRAINING PROGRAM

## 2025-04-29 PROCEDURE — 83735 ASSAY OF MAGNESIUM: CPT | Performed by: STUDENT IN AN ORGANIZED HEALTH CARE EDUCATION/TRAINING PROGRAM

## 2025-04-29 PROCEDURE — 63600175 PHARM REV CODE 636 W HCPCS: Performed by: STUDENT IN AN ORGANIZED HEALTH CARE EDUCATION/TRAINING PROGRAM

## 2025-04-29 PROCEDURE — 25000003 PHARM REV CODE 250: Performed by: STUDENT IN AN ORGANIZED HEALTH CARE EDUCATION/TRAINING PROGRAM

## 2025-04-29 PROCEDURE — 20600001 HC STEP DOWN PRIVATE ROOM

## 2025-04-29 PROCEDURE — 99223 1ST HOSP IP/OBS HIGH 75: CPT | Mod: 57,GC,, | Performed by: SURGERY

## 2025-04-29 PROCEDURE — 36415 COLL VENOUS BLD VENIPUNCTURE: CPT | Performed by: STUDENT IN AN ORGANIZED HEALTH CARE EDUCATION/TRAINING PROGRAM

## 2025-04-29 PROCEDURE — 96372 THER/PROPH/DIAG INJ SC/IM: CPT | Performed by: STUDENT IN AN ORGANIZED HEALTH CARE EDUCATION/TRAINING PROGRAM

## 2025-04-29 PROCEDURE — 63600175 PHARM REV CODE 636 W HCPCS

## 2025-04-29 PROCEDURE — 84155 ASSAY OF PROTEIN SERUM: CPT | Performed by: STUDENT IN AN ORGANIZED HEALTH CARE EDUCATION/TRAINING PROGRAM

## 2025-04-29 RX ORDER — SODIUM CHLORIDE, SODIUM LACTATE, POTASSIUM CHLORIDE, CALCIUM CHLORIDE 600; 310; 30; 20 MG/100ML; MG/100ML; MG/100ML; MG/100ML
INJECTION, SOLUTION INTRAVENOUS CONTINUOUS
Status: DISCONTINUED | OUTPATIENT
Start: 2025-04-29 | End: 2025-04-30

## 2025-04-29 RX ORDER — MORPHINE SULFATE 2 MG/ML
4 INJECTION, SOLUTION INTRAMUSCULAR; INTRAVENOUS EVERY 4 HOURS PRN
Refills: 0 | Status: DISCONTINUED | OUTPATIENT
Start: 2025-04-29 | End: 2025-05-01 | Stop reason: HOSPADM

## 2025-04-29 RX ORDER — KIT FOR THE PREPARATION OF TECHNETIUM TC 99M MEBROFENIN 45 MG/10ML
5.07 INJECTION, POWDER, LYOPHILIZED, FOR SOLUTION INTRAVENOUS
Status: COMPLETED | OUTPATIENT
Start: 2025-04-29 | End: 2025-04-29

## 2025-04-29 RX ORDER — MORPHINE SULFATE 2 MG/ML
4 INJECTION, SOLUTION INTRAMUSCULAR; INTRAVENOUS ONCE
Status: DISCONTINUED | OUTPATIENT
Start: 2025-04-29 | End: 2025-04-30

## 2025-04-29 RX ORDER — ALUMINUM HYDROXIDE, MAGNESIUM HYDROXIDE, AND SIMETHICONE 1200; 120; 1200 MG/30ML; MG/30ML; MG/30ML
30 SUSPENSION ORAL ONCE
Status: DISCONTINUED | OUTPATIENT
Start: 2025-04-29 | End: 2025-04-30

## 2025-04-29 RX ORDER — MORPHINE SULFATE 4 MG/ML
INJECTION, SOLUTION INTRAMUSCULAR; INTRAVENOUS
Status: COMPLETED
Start: 2025-04-29 | End: 2025-04-29

## 2025-04-29 RX ORDER — LIDOCAINE HYDROCHLORIDE 20 MG/ML
15 SOLUTION OROPHARYNGEAL ONCE
Status: DISCONTINUED | OUTPATIENT
Start: 2025-04-29 | End: 2025-04-30

## 2025-04-29 RX ADMIN — ENOXAPARIN SODIUM 40 MG: 40 INJECTION SUBCUTANEOUS at 08:04

## 2025-04-29 RX ADMIN — MORPHINE SULFATE 4 MG: 2 INJECTION, SOLUTION INTRAMUSCULAR; INTRAVENOUS at 03:04

## 2025-04-29 RX ADMIN — MORPHINE SULFATE 4 MG: 4 INJECTION INTRAVENOUS at 01:04

## 2025-04-29 RX ADMIN — MORPHINE SULFATE 4 MG: 2 INJECTION, SOLUTION INTRAMUSCULAR; INTRAVENOUS at 10:04

## 2025-04-29 RX ADMIN — PANTOPRAZOLE SODIUM 20 MG: 20 TABLET, DELAYED RELEASE ORAL at 08:04

## 2025-04-29 RX ADMIN — PIPERACILLIN SODIUM AND TAZOBACTAM SODIUM 4.5 G: 4; .5 INJECTION, POWDER, FOR SOLUTION INTRAVENOUS at 05:04

## 2025-04-29 RX ADMIN — MORPHINE SULFATE 4 MG: 4 INJECTION, SOLUTION INTRAMUSCULAR; INTRAVENOUS at 01:04

## 2025-04-29 RX ADMIN — SODIUM CHLORIDE, POTASSIUM CHLORIDE, SODIUM LACTATE AND CALCIUM CHLORIDE: 600; 310; 30; 20 INJECTION, SOLUTION INTRAVENOUS at 05:04

## 2025-04-29 RX ADMIN — MEBROFENIN 5.07 MILLICURIE: 45 INJECTION, POWDER, LYOPHILIZED, FOR SOLUTION INTRAVENOUS at 11:04

## 2025-04-29 RX ADMIN — ATORVASTATIN CALCIUM 20 MG: 20 TABLET, FILM COATED ORAL at 08:04

## 2025-04-29 NOTE — SUBJECTIVE & OBJECTIVE
Interval History: + abdominal pain.  T-max 100.6° F overnight.  No further episodes of nausea/vomiting    Review of Systems  Objective:     Vital Signs (Most Recent):  Temp: 98 °F (36.7 °C) (04/29/25 1526)  Pulse: 81 (04/29/25 1526)  Resp: 18 (04/29/25 1526)  BP: (!) 143/65 (04/29/25 1526)  SpO2: (!) 94 % (04/29/25 1526) Vital Signs (24h Range):  Temp:  [98 °F (36.7 °C)-100.6 °F (38.1 °C)] 98 °F (36.7 °C)  Pulse:  [81-95] 81  Resp:  [16-20] 18  SpO2:  [92 %-98 %] 94 %  BP: (123-162)/(58-81) 143/65     Weight: 131.3 kg (289 lb 7.4 oz)  Body mass index is 41.53 kg/m².  No intake or output data in the 24 hours ending 04/29/25 1655      Physical Exam  Constitutional:       General: He is not in acute distress.  Cardiovascular:      Rate and Rhythm: Normal rate.      Pulses: Normal pulses.   Pulmonary:      Effort: No respiratory distress.      Breath sounds: Normal breath sounds. No wheezing.   Abdominal:      General: Bowel sounds are normal. There is no distension.      Palpations: Abdomen is soft.      Tenderness: There is abdominal tenderness.   Musculoskeletal:      Right lower leg: No edema.      Left lower leg: No edema.   Neurological:      Mental Status: He is alert and oriented to person, place, and time. Mental status is at baseline.               Significant Labs: All pertinent labs within the past 24 hours have been reviewed.    Significant Imaging: I have reviewed all pertinent imaging results/findings within the past 24 hours.

## 2025-04-29 NOTE — ANESTHESIA PREPROCEDURE EVALUATION
[facility]  Anesthesia Pre-Operative Evaluation         Patient Name: Luís Torres  YOB: 1960  MRN: [id number]    SUBJECTIVE:     Pre-operative Evaluation for Procedure(s) (LRB):  ROBOTIC CHOLECYSTECTOMY (N/A)     04/29/2025    Luís Torres is a 65 y.o. male with a PMHx significant for HTN, HLD, T2DM, GERD with acute cholecystitis.     Level of Care: Floor  Respiratory Status: RA  IV Access: PIvx2  NPO Status: NPOMN    Previous Airway: None documented    LDA:        Peripheral IV - Single Lumen 04/28/25 1156 20 G Right Hand (Active)   Site Assessment Clean;Dry;Intact;No swelling;No redness 04/29/25 0845   Line Securement Device Secured with sutureless device 04/28/25 2210   Extremity Assessment Distal to IV No abnormal discoloration;No redness;No swelling;No warmth 04/28/25 2210   Line Status Flushed;Saline locked 04/29/25 0845   Dressing Status Clean;Dry;Intact 04/29/25 0845   Dressing Intervention Integrity maintained 04/29/25 0845   Reason Not Rotated Not due 04/28/25 2210   Number of days: 1            Peripheral IV - Single Lumen 04/28/25 1849 20 G Right Antecubital (Active)   Site Assessment Clean;Dry;Intact;No redness;No swelling 04/29/25 0845   Line Securement Device Secured with sutureless device 04/28/25 2210   Extremity Assessment Distal to IV No abnormal discoloration;No redness;No swelling;No warmth 04/28/25 2210   Line Status Blood return noted;Flushed 04/29/25 0400   Dressing Status Clean;Dry;Intact 04/29/25 0845   Dressing Intervention Integrity maintained 04/29/25 0845   Reason Not Rotated Not due 04/28/25 2210   Number of days: 0       Drips:      lactated ringers   Intravenous Continuous           Problem List[1]    Review of patient's allergies indicates:   Allergen Reactions    No known drug allergies        Current Inpatient Medications:   aluminum-magnesium hydroxide-simethicone  30 mL Oral Once    And    LIDOcaine viscous HCl 2%  15 mL Oral Once    atorvastatin  20  "mg Oral Daily    enoxparin  40 mg Subcutaneous Q12H    morphine  4 mg Intravenous Once    pantoprazole  20 mg Oral Daily    piperacillin-tazobactam (Zosyn) IV (PEDS and ADULTS) (extended infusion is not appropriate)  4.5 g Intravenous Q8H    polyethylene glycol  17 g Oral Daily       Past Surgical History:   Procedure Laterality Date    ADENOIDECTOMY  1965    CATARACT EXTRACTION Left     COLONOSCOPY N/A 08/30/2024    Procedure: COLONOSCOPY;  Surgeon: Chris Prieto MD;  Location: [facility] ENDOSCOPY;  Service: Endoscopy;  Laterality: N/A;  Ref by Dr RODOLFO Phelan, PEG, portal - PC  8/21/24- [facility]/portal for pc-- pt returned call, pc complete. DBM    EYE SURGERY  05/2020    Cataract removal R eye    knee replacement Left     TONSILLECTOMY  1965       Social History     Substance and Sexual Activity   Drug Use No     Tobacco Use: Low Risk  (4/28/2025)    Patient History     Smoking Tobacco Use: Never     Smokeless Tobacco Use: Never     Passive Exposure: Not on file     Alcohol Use: Not At Risk (4/28/2025)    AUDIT-C     Frequency of Alcohol Consumption: Never     Average Number of Drinks: Patient does not drink     Frequency of Binge Drinking: Never       OBJECTIVE:     Vital Signs Range (Last 24H):  Temp:  [36.7 °C (98 °F)-38.1 °C (100.6 °F)]   Pulse:  [81-95]   Resp:  [16-20]   BP: (123-162)/(58-81)   SpO2:  [92 %-98 %]       Significant Labs    Heme Profile  Lab Results   Component Value Date    WBC 10.65 04/29/2025    HGB 13.5 (L) 04/29/2025    HCT 39.9 (L) 04/29/2025     (L) 04/29/2025       Coagulation Studies  No results found for: "LABPROT", "INR", "APTT"    Metabolic Profile  Lab Results   Component Value Date     (L) 04/29/2025    K 3.5 04/29/2025    CL 96 04/29/2025    CO2 21 (L) 04/29/2025    BUN 42 (H) 04/29/2025    CREATININE 1.0 04/29/2025    MG 2.5 04/29/2025    PHOS 2.1 (L) 04/29/2025       Liver Function Tests  Lab Results   Component Value Date    AST 33 04/29/2025    ALT 21 04/29/2025    " ALKPHOS 66 04/29/2025    BILITOT 0.8 04/29/2025    PROT 6.7 04/29/2025    ALBUMIN 2.5 (L) 04/29/2025       Lipid Profile  Lab Results   Component Value Date    CHOL 168 01/08/2025    HDL 34 (L) 01/08/2025    TRIG 387 (H) 01/08/2025       Endocrine Profile  Lab Results   Component Value Date    HGBA1C 8.5 (H) 01/08/2025    TSH 2.13 08/18/2021       Diagnostic Studies      EKG:   Results for orders placed or performed during the hospital encounter of 04/28/25   EKG 12-lead    Collection Time: 04/28/25 11:26 AM   Result Value Ref Range    QRS Duration 114 ms    [facility] QTC Calculation 447 ms    Narrative    Test Reason : R10.9,    Vent. Rate : 104 BPM     Atrial Rate : 104 BPM     P-R Int : 170 ms          QRS Dur : 114 ms      QT Int : 340 ms       P-R-T Axes :  48 -24  41 degrees    QTcB Int : 447 ms    Sinus tachycardia  Low septal forces  Nonspecific ST and/or T wave abnormalities  Abnormal ECG  When compared with ECG of 25-Apr-2025 04:49,  Vent. rate has increased by  53 bpm  Confirmed by Tae Lawler ([id number]) on 4/28/2025 12:48:38 PM    Referred By: AAAREFERRAL SELF           Confirmed By: Tae Lawler           ASSESSMENT/PLAN:     Luís Torres is a 65 y.o. male with HTN, HLD, T2DM, GERD with acute cholecystitis.     Pre-op Assessment    I have reviewed the Patient Summary Reports.     I have reviewed the Nursing Notes. I have reviewed the NPO Status.   I have reviewed the Medications.     Review of Systems  Anesthesia Hx:  No problems with previous Anesthesia   History of prior surgery of interest to airway management or planning:             Cardiovascular:     Hypertension                                          Pulmonary:        Sleep Apnea                Hepatic/GI:     GERD                Musculoskeletal:  Arthritis          Spine Disorders:             Neurological:        Chronic Pain Syndrome                         Endocrine:  Diabetes         Obesity / BMI > 30, Morbid Obesity / BMI >  40      Physical Exam  General: Well nourished, Cooperative, Alert and Oriented    Airway:  Mallampati: III / II  Mouth Opening: Normal  TM Distance: Normal  Tongue: Normal  Neck ROM: Normal ROM    Dental:  Intact, Periodontal disease    Heart:  Rate: Normal        Anesthesia Plan  Type of Anesthesia, risks & benefits discussed:    Anesthesia Type: Gen ETT  Intra-op Monitoring Plan: Standard ASA Monitors  Post Op Pain Control Plan: multimodal analgesia and IV/PO Opioids PRN  Induction:  IV  Airway Plan: Direct and Video, Post-Induction  Informed Consent: Informed consent signed with the Patient and all parties understand the risks and agree with anesthesia plan.  All questions answered. Patient consented to blood products? No  ASA Score: 3  Day of Surgery Review of History & Physical: H&P Update referred to the surgeon/provider.    Ready For Surgery From Anesthesia Perspective.     .           [1]   Patient Active Problem List  Diagnosis    Hypertension associated with diabetes    Hyperlipidemia due to type 2 diabetes mellitus    Type 2 diabetes mellitus with hyperglycemia, without long-term current use of insulin    Class 3 severe obesity due to excess calories with serious comorbidity and body mass index (BMI) of 40.0 to 44.9 in adult    Erectile dysfunction    Hypertriglyceridemia    Hypertension    Diabetes    Hyperlipidemia    RUQ abdominal pain    Cholelithiasis with acute cholecystitis

## 2025-04-29 NOTE — SUBJECTIVE & OBJECTIVE
No current facility-administered medications on file prior to encounter.     Current Outpatient Medications on File Prior to Encounter   Medication Sig    atorvastatin (LIPITOR) 20 MG tablet Take 1 tablet (20 mg total) by mouth once daily.    blood sugar diagnostic (ONETOUCH VERIO TEST STRIPS) Strp 1 strip by Misc.(Non-Drug; Combo Route) route 2 (two) times a day.    empagliflozin (JARDIANCE) 25 mg tablet Take 1 tablet (25 mg total) by mouth once daily. Requesting formulary tier exemption    lancets 33 gauge Misc 1 lancet  by Misc.(Non-Drug; Combo Route) route 2 (two) times a day.    lisinopriL (PRINIVIL,ZESTRIL) 20 MG tablet Take 1 tablet (20 mg total) by mouth once daily.    metFORMIN (GLUCOPHAGE-XR) 500 MG ER 24hr tablet Take 2 tablets (1,000 mg total) by mouth once daily.    pantoprazole (PROTONIX) 20 MG tablet Take 1 tablet (20 mg total) by mouth once daily.    tadalafiL (CIALIS) 20 MG Tab Take 1 tablet (20 mg total) by mouth daily as needed.       Review of patient's allergies indicates:   Allergen Reactions    No known drug allergies        History reviewed. No pertinent past medical history.  Past Surgical History:   Procedure Laterality Date    ADENOIDECTOMY  1965    CATARACT EXTRACTION Left     COLONOSCOPY N/A 08/30/2024    Procedure: COLONOSCOPY;  Surgeon: Chris Prieto MD;  Location: [facility] ENDOSCOPY;  Service: Endoscopy;  Laterality: N/A;  Ref by Dr RODOLFO Phelan, PEG, portal - PC  8/21/24- [facility]/portal for pc-- pt returned call, pc complete. DB    EYE SURGERY  05/2020    Cataract removal R eye    knee replacement Left     TONSILLECTOMY  1965     Family History       Problem Relation (Age of Onset)    Diabetes Mother    Heart disease Mother, Father    Hypertension Mother    Vision loss Mother          Tobacco Use    Smoking status: Never    Smokeless tobacco: Never   Substance and Sexual Activity    Alcohol use: Yes     Alcohol/week: 6.0 standard drinks of alcohol     Types: 4 Cans of beer, 2 Shots of  liquor per week    Drug use: No    Sexual activity: Yes     Partners: Female     Birth control/protection: None     Review of Systems   Constitutional:  Positive for activity change and appetite change. Negative for fatigue and fever.   HENT: Negative.     Respiratory:  Negative for cough and shortness of breath.    Cardiovascular:  Negative for chest pain.   Gastrointestinal:  Positive for abdominal distention, abdominal pain and nausea. Negative for constipation, diarrhea and vomiting.   Musculoskeletal: Negative.    Skin: Negative.      Objective:     Vital Signs (Most Recent):  Temp: 98 °F (36.7 °C) (04/29/25 1359)  Pulse: 88 (04/29/25 1359)  Resp: 16 (04/29/25 1359)  BP: 130/60 (04/29/25 1359)  SpO2: (!) 94 % (04/29/25 1359) Vital Signs (24h Range):  Temp:  [97.7 °F (36.5 °C)-100.6 °F (38.1 °C)] 98 °F (36.7 °C)  Pulse:  [86-95] 88  Resp:  [16-20] 16  SpO2:  [92 %-98 %] 94 %  BP: (123-162)/(58-81) 130/60     Weight: 131.3 kg (289 lb 7.4 oz)  Body mass index is 41.53 kg/m².     Physical Exam  Vitals and nursing note reviewed.   Constitutional:       General: He is not in acute distress.  HENT:      Nose: Nose normal.      Mouth/Throat:      Mouth: Mucous membranes are moist.   Cardiovascular:      Rate and Rhythm: Normal rate and regular rhythm.   Pulmonary:      Effort: Pulmonary effort is normal. No respiratory distress.   Abdominal:      Comments: Abdomen soft, non-distended  Tender to palpation to the right upper quadrant  Non-tender throughout remainder of abdomen   Musculoskeletal:         General: Normal range of motion.   Skin:     General: Skin is warm and dry.   Neurological:      General: No focal deficit present.      Mental Status: He is alert.            I have reviewed all pertinent lab results within the past 24 hours.  CBC:   Recent Labs   Lab 04/29/25 0409   WBC 10.65   RBC 4.38*   HGB 13.5*   HCT 39.9*   *   MCV 91   MCH 30.8   MCHC 33.8     CMP:   Recent Labs   Lab 04/29/25 0409   GLU  170*   CALCIUM 8.9   ALBUMIN 2.5*   PROT 6.7   *   K 3.5   CO2 21*   CL 96   BUN 42*   CREATININE 1.0   ALKPHOS 66   ALT 21   AST 33   BILITOT 0.8       Significant Diagnostics:  I have reviewed all pertinent imaging results/findings within the past 24 hours.

## 2025-04-29 NOTE — PLAN OF CARE
Plan of care reviewed with patient and wife, understanding of care verbalized. Patient remained free of falls/injuries, fall precaution in place. Patient is resting in bed with no questions or complaints at this time.  Bed is in lowest locked position, call light within reach.   Problem: Adult Inpatient Plan of Care  Goal: Plan of Care Review  4/29/2025 0422 by Jazmine Coombs RN  Outcome: Progressing  4/29/2025 0420 by Jazmine Coombs RN  Outcome: Progressing  Goal: Patient-Specific Goal (Individualized)  4/29/2025 0422 by Jazmine Coombs RN  Outcome: Progressing  4/29/2025 0420 by Jazmine Coombs RN  Outcome: Progressing  Goal: Absence of Hospital-Acquired Illness or Injury  4/29/2025 0422 by Jazmine Coombs RN  Outcome: Progressing  4/29/2025 0420 by Jazmine Coombs RN  Outcome: Progressing  Goal: Optimal Comfort and Wellbeing  4/29/2025 0422 by Jazmine Coombs RN  Outcome: Progressing  4/29/2025 0420 by Jazmine Coombs RN  Outcome: Progressing  Goal: Readiness for Transition of Care  4/29/2025 0422 by Jazmine Coombs RN  Outcome: Progressing  4/29/2025 0420 by Jazmine Coombs RN  Outcome: Progressing     Problem: Diabetes Comorbidity  Goal: Blood Glucose Level Within Targeted Range  4/29/2025 0422 by Jazmine Coombs RN  Outcome: Progressing  4/29/2025 0420 by Jazmine Coombs RN  Outcome: Progressing     Problem: Fall Injury Risk  Goal: Absence of Fall and Fall-Related Injury  Outcome: Progressing     Problem: Pain Acute  Goal: Optimal Pain Control and Function  Outcome: Progressing

## 2025-04-29 NOTE — ASSESSMENT & PLAN NOTE
Luís Torres is a 65yoM with a five day history of RUQ abdominal pain. HIDA consistent with acute cholecystitis. General surgery consulted    - patient seen and examined   - labs and imaging reviewed  - plan for robotic cholecystectomy on 4/30   - surgical consent to be obtained  - recommend IV zosyn  - NPO at midnight  - please call with questions

## 2025-04-29 NOTE — H&P
[facility] - Emergency Dept  [facility] Medicine  History & Physical    Patient Name: Luís Torres  MRN: [id number]  Patient Class: OP- Observation  Admission Date: 4/28/2025  Attending Physician: Cyndi Fortune,*   Primary Care Provider: Kirti Phelan MD         Patient information was obtained from patient, past medical records, and ER records.     Subjective:     Principal Problem:RUQ abdominal pain    Chief Complaint:   Chief Complaint   Patient presents with    Abdominal Pain     Seen here on Friday  for r upper abd, sent from dr office        HPI: 65 y.o. male with past medical history of hypertension, diabetes, hyperlipidemia who presented to [facility] ED on 04/28/2025 as directed by GI outpatient for evaluation of abdominal pain.  Patient reports acute onset right and left upper quadrant abdominal pain for the last 4-5 days associated with nausea/several episodes of bilious nonbloody vomiting.  He was evaluated in ED on 04/25/2025.  Ultrasound revealed Biliary sludge without evidence of gallbladder inflammatory change/acute cholecystitis.   CT abdomen revealed Gallbladder distension with biliary sludge.  No biliary ductal dilatation or evidence of acute cholecystitis. Mild duodenal wall thickening hyperattenuation without significant surrounding inflammatory change, nonspecific can be seen in the setting of inflammatory/infectious enteritis .Patient was discharged home on p.o. pain regimen.  However patient reports worsening pain over the last 1-2 days.  Associated with fever T-max 100.1° F. he was seen at GI Dr. Landaverde office this morning and was recommended to present to ED for further evaluation with HIDA scan.  Patient denied previous history of gallbladder/pancreas problems.     During evaluation in the ED, patient is hemodynamically stable.  Labs revealed WBC 12.5, hemoglobin 15, platelets 113.  Sodium 130, potassium 3.7, creatinine 1.1, AST 18, ALT 12, total bilirubin 0.8 .   patient was given 1 L NS, Toradol .ED discussed with GI on-call recommended HIDA scan and consult AES if positive.  Admitted to hospital medicine for further management    History reviewed. No pertinent past medical history.    Past Surgical History:   Procedure Laterality Date    ADENOIDECTOMY  1965    CATARACT EXTRACTION Left     COLONOSCOPY N/A 08/30/2024    Procedure: COLONOSCOPY;  Surgeon: Chris Prieto MD;  Location: [facility] ENDOSCOPY;  Service: Endoscopy;  Laterality: N/A;  Ref by Dr RODOLFO Phelan, PEG, portal - PC  8/21/24- lvm/portal for pc-- pt returned call, pc complete. DBM    EYE SURGERY  05/2020    Cataract removal R eye    knee replacement Left     TONSILLECTOMY  1965       Review of patient's allergies indicates:   Allergen Reactions    No known drug allergies        No current facility-administered medications on file prior to encounter.     Current Outpatient Medications on File Prior to Encounter   Medication Sig    atorvastatin (LIPITOR) 20 MG tablet Take 1 tablet (20 mg total) by mouth once daily.    blood sugar diagnostic (ONETOUCH VERIO TEST STRIPS) Strp 1 strip by Misc.(Non-Drug; Combo Route) route 2 (two) times a day.    empagliflozin (JARDIANCE) 25 mg tablet Take 1 tablet (25 mg total) by mouth once daily. Requesting formulary tier exemption    lancets 33 gauge Misc 1 lancet  by Misc.(Non-Drug; Combo Route) route 2 (two) times a day.    lisinopriL (PRINIVIL,ZESTRIL) 20 MG tablet Take 1 tablet (20 mg total) by mouth once daily.    metFORMIN (GLUCOPHAGE-XR) 500 MG ER 24hr tablet Take 2 tablets (1,000 mg total) by mouth once daily.    pantoprazole (PROTONIX) 20 MG tablet Take 1 tablet (20 mg total) by mouth once daily.    tadalafiL (CIALIS) 20 MG Tab Take 1 tablet (20 mg total) by mouth daily as needed.     Family History       Problem Relation (Age of Onset)    Diabetes Mother    Heart disease Mother, Father    Hypertension Mother    Vision loss Mother          Tobacco Use    Smoking status:  Never    Smokeless tobacco: Never   Substance and Sexual Activity    Alcohol use: Yes     Alcohol/week: 6.0 standard drinks of alcohol     Types: 4 Cans of beer, 2 Shots of liquor per week    Drug use: No    Sexual activity: Yes     Partners: Female     Birth control/protection: None     Review of Systems  Objective:     Vital Signs (Most Recent):  Temp: 100 °F (37.8 °C) (04/28/25 2014)  Pulse: 90 (04/28/25 2014)  Resp: 16 (04/28/25 2014)  BP: (!) 159/81 (04/28/25 2014)  SpO2: 95 % (04/28/25 2014) Vital Signs (24h Range):  Temp:  [97.7 °F (36.5 °C)-100 °F (37.8 °C)] 100 °F (37.8 °C)  Pulse:  [] 90  Resp:  [16-18] 16  SpO2:  [95 %] 95 %  BP: (109-159)/(58-81) 159/81     Weight: 130.6 kg (288 lb)  Body mass index is 41.32 kg/m².     Physical Exam  Cardiovascular:      Rate and Rhythm: Normal rate.      Pulses: Normal pulses.   Pulmonary:      Effort: Pulmonary effort is normal. No respiratory distress.      Breath sounds: No wheezing.   Abdominal:      General: Bowel sounds are normal. There is no distension.      Tenderness: There is abdominal tenderness.   Musculoskeletal:      Right lower leg: No edema.      Left lower leg: No edema.   Neurological:      Mental Status: He is alert and oriented to person, place, and time. Mental status is at baseline.                Significant Labs: All pertinent labs within the past 24 hours have been reviewed.    Significant Imaging: I have reviewed all pertinent imaging results/findings within the past 24 hours.    Assessment/Plan:     Assessment & Plan  RUQ abdominal pain    Patient was admitted with night upper quadrant abdominal pain.  Ultrasound with evidence of biliary distention with gallbladder sludge.  Initiate an empiric ceftriaxone with concern for acute cholecystitis with left shift WBC trend  Obtain HIDA scan.  Keep patient NPO follow up on blood cultures  Hypertension  Patient's blood pressure range in the last 24 hours was: BP  Min: 109/58  Max: 159/81.The  patient's inpatient anti-hypertensive regimen is listed below:  Current Antihypertensives       Plan  - BP is controlled, Holding home amlo  Diabetes  Patient's FSGs are controlled on current medication regimen.  Last A1c reviewed-   Lab Results   Component Value Date    HGBA1C 8.5 (H) 01/08/2025     Most recent fingerstick glucose reviewed-   Recent Labs   Lab 04/28/25  2017   POCTGLUCOSE 172*     Current correctional scale  Medium  Maintain anti-hyperglycemic dose as follows-   Antihyperglycemics (From admission, onward)      Start     Stop Route Frequency Ordered    04/28/25 1827  insulin aspart U-100 pen 0-10 Units         -- SubQ Before meals & nightly PRN 04/28/25 1727          Hold Oral hypoglycemics while patient is in the hospital.  Hyperlipidemia  Continue statin    VTE Risk Mitigation (From admission, onward)           Ordered     enoxaparin injection 40 mg  Every 12 hours         04/28/25 1727     IP VTE HIGH RISK PATIENT  Once         04/28/25 1727     Place sequential compression device  Until discontinued         04/28/25 1727                       On 04/28/2025, patient should be placed in hospital observation services under my care.        Pharmacist Dose Adjustment Note    Luís Torres is a 65 y.o. male being treated with enoxaparin 40 mg ever 24 hours for VTE ppx     Patient Data:    Vital Signs (Most Recent):  Temp: 97.7 °F (36.5 °C) (04/28/25 1553)  Pulse: 94 (04/28/25 1553)  Resp: 18 (04/28/25 1553)  BP: 124/77 (04/28/25 1553)  SpO2: 95 % (04/28/25 1553) Vital Signs (72h Range):  Temp:  [97.7 °F (36.5 °C)-98.4 °F (36.9 °C)]   Pulse:  []   Resp:  [18]   BP: (109-124)/(58-77)   SpO2:  [95 %]      Recent Labs   Lab 04/25/25  0532 04/28/25  1321   CREATININE 1.0 1.1     Serum creatinine: 1.1 mg/dL 04/28/25 1321  Estimated creatinine clearance: 90.9 mL/min    Adjusted to  Enoxaparin 40 mg every 12 hours,     Pharmacist's Name: Nuria Jose  Pharmacist's Extension: [phone]       Cyndi  MD Tong  Department of Hospital Medicine  [facility] - Emergency Dept

## 2025-04-29 NOTE — HOSPITAL COURSE
65 y.o. male with past medical history of hypertension, diabetes, hyperlipidemia who was admitted with acute cholecystitis.  HIDA scan with cystic duct obstruction and acute cholecystitis.  General surgery was consulted.  Broadened antibiotics IV Zosyn per surgery. Bd cultures negative. Underwent uncomplicated cholecystectomy on 04/30. Tolerating diet well. Abx de-escalated to 3 days of augmentin. Post op requiring 0.5  of O2 when ambulating.  Likely post op atalectasis. Educated on increased ambulation, pain control, and incentive spirometer (patient has 2 at home already). Unable to discharge with home O2 after lengthy discussion with DME department as he is not requiring sufficient O2.  Educated on return to ED precautions. Discharged with 10 pills of oxycodone, educated patient on opiates as well as need for daily stool softener. Patient well aware.   Follow up with PCP and surgery. Has multiple walkers and DME at home as well as excellent family support.

## 2025-04-29 NOTE — PROGRESS NOTES
Call received from [facility] for Hida-Scan and requires MSO4  4mg IVP per order / pt on CT scanner ID completed and allergies review / Morphine 4mg IVP given and pt resuming scan

## 2025-04-29 NOTE — CONSULTS
[facility] - Cardiology Stepdown  General Surgery  Consult Note    Patient Name: Luís Torres  MRN: [id number]  Code Status: Full Code  Admission Date: 4/28/2025  Hospital Length of Stay: 0 days  Attending Physician: Cyndi Fortune,*  Primary Care Provider: Kirti Phelan MD    Patient information was obtained from patient and past medical records.     Inpatient consult to General Surgery  Consult performed by: Ovi Briggs MD  Consult ordered by: Cyndi Fortune MD        Subjective:     Principal Problem: RUQ abdominal pain    History of Present Illness: Luís Torres is a 65yoM with a history of hypertension, diabetes and hyperlipidemia who is currently admitted to the hospital medicine service with a 5-day history of right upper quadrant abdominal pain. The patient initially reported to the ED on 4/25/25 with upper abdominal pain. His work-up was non-revealing and he was discharged. He was seen in the outpatient GI clinic who referred him for admission and further work-up. A HIDA scan was obtained, which is consistent with acute cholecystitis. General surgery consulted for evaluation.     At the time of my exam, the patient is resting comfortably. He continues to endorse right upper quadrant abdominal pain. He states that he only gets relief with IV pain medication. There was previously associated nausea, vomiting and PO intolerance. I explained his imaging findings and the plan of care to he and his wife.     He has no history of intra-abdominal surgery. He takes no blood thinning medications.       No current facility-administered medications on file prior to encounter.     Current Outpatient Medications on File Prior to Encounter   Medication Sig    atorvastatin (LIPITOR) 20 MG tablet Take 1 tablet (20 mg total) by mouth once daily.    blood sugar diagnostic (ONETOUCH VERIO TEST STRIPS) Strp 1 strip by Misc.(Non-Drug; Combo Route) route 2 (two) times a day.    empagliflozin  (JARDIANCE) 25 mg tablet Take 1 tablet (25 mg total) by mouth once daily. Requesting formulary tier exemption    lancets 33 gauge Misc 1 lancet  by Misc.(Non-Drug; Combo Route) route 2 (two) times a day.    lisinopriL (PRINIVIL,ZESTRIL) 20 MG tablet Take 1 tablet (20 mg total) by mouth once daily.    metFORMIN (GLUCOPHAGE-XR) 500 MG ER 24hr tablet Take 2 tablets (1,000 mg total) by mouth once daily.    pantoprazole (PROTONIX) 20 MG tablet Take 1 tablet (20 mg total) by mouth once daily.    tadalafiL (CIALIS) 20 MG Tab Take 1 tablet (20 mg total) by mouth daily as needed.       Review of patient's allergies indicates:   Allergen Reactions    No known drug allergies        History reviewed. No pertinent past medical history.  Past Surgical History:   Procedure Laterality Date    ADENOIDECTOMY  1965    CATARACT EXTRACTION Left     COLONOSCOPY N/A 08/30/2024    Procedure: COLONOSCOPY;  Surgeon: Chris Prieto MD;  Location: [facility] ENDOSCOPY;  Service: Endoscopy;  Laterality: N/A;  Ref by Dr RODOLFO Phelan, PEG, portal - PC  8/21/24- lv/portal for pc-- pt returned call, pc complete. DBM    EYE SURGERY  05/2020    Cataract removal R eye    knee replacement Left     TONSILLECTOMY  1965     Family History       Problem Relation (Age of Onset)    Diabetes Mother    Heart disease Mother, Father    Hypertension Mother    Vision loss Mother          Tobacco Use    Smoking status: Never    Smokeless tobacco: Never   Substance and Sexual Activity    Alcohol use: Yes     Alcohol/week: 6.0 standard drinks of alcohol     Types: 4 Cans of beer, 2 Shots of liquor per week    Drug use: No    Sexual activity: Yes     Partners: Female     Birth control/protection: None     Review of Systems   Constitutional:  Positive for activity change and appetite change. Negative for fatigue and fever.   HENT: Negative.     Respiratory:  Negative for cough and shortness of breath.    Cardiovascular:  Negative for chest pain.   Gastrointestinal:   Positive for abdominal distention, abdominal pain and nausea. Negative for constipation, diarrhea and vomiting.   Musculoskeletal: Negative.    Skin: Negative.      Objective:     Vital Signs (Most Recent):  Temp: 98 °F (36.7 °C) (04/29/25 1359)  Pulse: 88 (04/29/25 1359)  Resp: 16 (04/29/25 1359)  BP: 130/60 (04/29/25 1359)  SpO2: (!) 94 % (04/29/25 1359) Vital Signs (24h Range):  Temp:  [97.7 °F (36.5 °C)-100.6 °F (38.1 °C)] 98 °F (36.7 °C)  Pulse:  [86-95] 88  Resp:  [16-20] 16  SpO2:  [92 %-98 %] 94 %  BP: (123-162)/(58-81) 130/60     Weight: 131.3 kg (289 lb 7.4 oz)  Body mass index is 41.53 kg/m².     Physical Exam  Vitals and nursing note reviewed.   Constitutional:       General: He is not in acute distress.  HENT:      Nose: Nose normal.      Mouth/Throat:      Mouth: Mucous membranes are moist.   Cardiovascular:      Rate and Rhythm: Normal rate and regular rhythm.   Pulmonary:      Effort: Pulmonary effort is normal. No respiratory distress.   Abdominal:      Comments: Abdomen soft, non-distended  Tender to palpation to the right upper quadrant  Non-tender throughout remainder of abdomen   Musculoskeletal:         General: Normal range of motion.   Skin:     General: Skin is warm and dry.   Neurological:      General: No focal deficit present.      Mental Status: He is alert.            I have reviewed all pertinent lab results within the past 24 hours.  CBC:   Recent Labs   Lab 04/29/25  0409   WBC 10.65   RBC 4.38*   HGB 13.5*   HCT 39.9*   *   MCV 91   MCH 30.8   MCHC 33.8     CMP:   Recent Labs   Lab 04/29/25  0409   *   CALCIUM 8.9   ALBUMIN 2.5*   PROT 6.7   *   K 3.5   CO2 21*   CL 96   BUN 42*   CREATININE 1.0   ALKPHOS 66   ALT 21   AST 33   BILITOT 0.8       Significant Diagnostics:  I have reviewed all pertinent imaging results/findings within the past 24 hours.    Assessment/Plan:     * RUQ abdominal pain  Luís Torres is a 65yoM with a five day history of RUQ abdominal  pain. HIDA consistent with acute cholecystitis. General surgery consulted    - patient seen and examined   - labs and imaging reviewed  - plan for robotic cholecystectomy on 4/30   - surgical consent to be obtained  - recommend IV zosyn  - NPO at midnight  - please call with questions      VTE Risk Mitigation (From admission, onward)           Ordered     enoxaparin injection 40 mg  Every 12 hours         04/28/25 1727     IP VTE HIGH RISK PATIENT  Once         04/28/25 1727     Place sequential compression device  Until discontinued         04/28/25 1727                    Thank you for your consult. I will follow-up with patient. Please contact us if you have any additional questions.    Ovi Briggs MD  General Surgery  [facility] - Cardiology Stepdown

## 2025-04-29 NOTE — PROGRESS NOTES
04/28/25 2209   Vital Signs   Temp (!) 100.6 °F (38.1 °C)   Temp Source Oral     Notifed MD. Thanh Willams of patient's elevated temperature. Patient denies any changes, no visible signs present. No new orders given at this time. Will recheck patient's temperature.

## 2025-04-29 NOTE — NURSING
MD Tong notified of patient stating he isn't on insulin at home and not wanting to have it while he's in the hospital. Patient stated that he takes metformin and Jardiance at home and Jardiance not being under his home medication in Epic.     Breakfast glucose 189, insulin coverage refused    Dinner glucose 188. Insulin coverage refused.

## 2025-04-29 NOTE — SUBJECTIVE & OBJECTIVE
History reviewed. No pertinent past medical history.    Past Surgical History:   Procedure Laterality Date    ADENOIDECTOMY  1965    CATARACT EXTRACTION Left     COLONOSCOPY N/A 08/30/2024    Procedure: COLONOSCOPY;  Surgeon: Chris Prieto MD;  Location: [facility] ENDOSCOPY;  Service: Endoscopy;  Laterality: N/A;  Ref by Dr RODOLFO Phelan, PEG, portal - PC  8/21/24- lvm/portal for pc-- pt returned call, pc complete. DBM    EYE SURGERY  05/2020    Cataract removal R eye    knee replacement Left     TONSILLECTOMY  1965       Review of patient's allergies indicates:   Allergen Reactions    No known drug allergies        No current facility-administered medications on file prior to encounter.     Current Outpatient Medications on File Prior to Encounter   Medication Sig    atorvastatin (LIPITOR) 20 MG tablet Take 1 tablet (20 mg total) by mouth once daily.    blood sugar diagnostic (ONETOUCH VERIO TEST STRIPS) Strp 1 strip by Misc.(Non-Drug; Combo Route) route 2 (two) times a day.    empagliflozin (JARDIANCE) 25 mg tablet Take 1 tablet (25 mg total) by mouth once daily. Requesting formulary tier exemption    lancets 33 gauge Misc 1 lancet  by Misc.(Non-Drug; Combo Route) route 2 (two) times a day.    lisinopriL (PRINIVIL,ZESTRIL) 20 MG tablet Take 1 tablet (20 mg total) by mouth once daily.    metFORMIN (GLUCOPHAGE-XR) 500 MG ER 24hr tablet Take 2 tablets (1,000 mg total) by mouth once daily.    pantoprazole (PROTONIX) 20 MG tablet Take 1 tablet (20 mg total) by mouth once daily.    tadalafiL (CIALIS) 20 MG Tab Take 1 tablet (20 mg total) by mouth daily as needed.     Family History       Problem Relation (Age of Onset)    Diabetes Mother    Heart disease Mother, Father    Hypertension Mother    Vision loss Mother          Tobacco Use    Smoking status: Never    Smokeless tobacco: Never   Substance and Sexual Activity    Alcohol use: Yes     Alcohol/week: 6.0 standard drinks of alcohol     Types: 4 Cans of beer, 2 Shots of  liquor per week    Drug use: No    Sexual activity: Yes     Partners: Female     Birth control/protection: None     Review of Systems  Objective:     Vital Signs (Most Recent):  Temp: 100 °F (37.8 °C) (04/28/25 2014)  Pulse: 90 (04/28/25 2014)  Resp: 16 (04/28/25 2014)  BP: (!) 159/81 (04/28/25 2014)  SpO2: 95 % (04/28/25 2014) Vital Signs (24h Range):  Temp:  [97.7 °F (36.5 °C)-100 °F (37.8 °C)] 100 °F (37.8 °C)  Pulse:  [] 90  Resp:  [16-18] 16  SpO2:  [95 %] 95 %  BP: (109-159)/(58-81) 159/81     Weight: 130.6 kg (288 lb)  Body mass index is 41.32 kg/m².     Physical Exam  Cardiovascular:      Rate and Rhythm: Normal rate.      Pulses: Normal pulses.   Pulmonary:      Effort: Pulmonary effort is normal. No respiratory distress.      Breath sounds: No wheezing.   Abdominal:      General: Bowel sounds are normal. There is no distension.      Tenderness: There is abdominal tenderness.   Musculoskeletal:      Right lower leg: No edema.      Left lower leg: No edema.   Neurological:      Mental Status: He is alert and oriented to person, place, and time. Mental status is at baseline.                Significant Labs: All pertinent labs within the past 24 hours have been reviewed.    Significant Imaging: I have reviewed all pertinent imaging results/findings within the past 24 hours.

## 2025-04-29 NOTE — ED NOTES
Assumed care of the patient. Report received from JENN Franklin. Side rails up X2, bed low and locked, and call light is placed within reach. One family/visitors at bedside at this time. Pt denies any complaints or needs.

## 2025-04-29 NOTE — HPI
Luís Torres is a 65yoM with a history of hypertension, diabetes and hyperlipidemia who is currently admitted to the hospital medicine service with a 5-day history of right upper quadrant abdominal pain. The patient initially reported to the ED on 4/25/25 with upper abdominal pain. His work-up was non-revealing and he was discharged. He was seen in the outpatient GI clinic who referred him for admission and further work-up. A HIDA scan was obtained, which is consistent with acute cholecystitis. General surgery consulted for evaluation.     At the time of my exam, the patient is resting comfortably. He continues to endorse right upper quadrant abdominal pain. He states that he only gets relief with IV pain medication. There was previously associated nausea, vomiting and PO intolerance. I explained his imaging findings and the plan of care to he and his wife.     He has no history of intra-abdominal surgery. He takes no blood thinning medications.

## 2025-04-29 NOTE — PROGRESS NOTES
[facility] - Cardiology [facility] Medicine  Progress Note    Patient Name: Luís Torres  MRN: [id number]  Patient Class: IP- Inpatient   Admission Date: 4/28/2025  Length of Stay: 0 days  Attending Physician: Cyndi Fortune,*  Primary Care Provider: Kirti Phelan MD        Subjective     Principal Problem:<principal problem not specified>        HPI:  65 y.o. male with past medical history of hypertension, diabetes, hyperlipidemia who presented to [facility] ED on 04/28/2025 as directed by GI outpatient for evaluation of abdominal pain.  Patient reports acute onset right and left upper quadrant abdominal pain for the last 4-5 days associated with nausea/several episodes of bilious nonbloody vomiting.  He was evaluated in ED on 04/25/2025.  Ultrasound revealed Biliary sludge without evidence of gallbladder inflammatory change/acute cholecystitis.   CT abdomen revealed Gallbladder distension with biliary sludge.  No biliary ductal dilatation or evidence of acute cholecystitis. Mild duodenal wall thickening hyperattenuation without significant surrounding inflammatory change, nonspecific can be seen in the setting of inflammatory/infectious enteritis .Patient was discharged home on p.o. pain regimen.  However patient reports worsening pain over the last 1-2 days.  Associated with fever T-max 100.1° F. he was seen at GI Dr. Ladnaverde office this morning and was recommended to present to ED for further evaluation with HIDA scan.  Patient denied previous history of gallbladder/pancreas problems.     During evaluation in the ED, patient is hemodynamically stable.  Labs revealed WBC 12.5, hemoglobin 15, platelets 113.  Sodium 130, potassium 3.7, creatinine 1.1, AST 18, ALT 12, total bilirubin 0.8 .  patient was given 1 L NS, Toradol .ED discussed with GI on-call recommended HIDA scan and consult AES if positive.  Admitted to hospital medicine for further management    Overview/Hospital Course:  65  y.o. male with past medical history of hypertension, diabetes, hyperlipidemia who was admitted with acute cholecystitis.  HIDA scan with cystic duct obstruction and acute cholecystitis.  General surgery was consulted.  Broaden antibiotics IV Zosyn.  Follow up on blood cultures.  NPO after midnight.  Plan for cholecystectomy in a.m..    Interval History: + abdominal pain.  T-max 100.6° F overnight.  No further episodes of nausea/vomiting    Review of Systems  Objective:     Vital Signs (Most Recent):  Temp: 98 °F (36.7 °C) (04/29/25 1526)  Pulse: 81 (04/29/25 1526)  Resp: 18 (04/29/25 1526)  BP: (!) 143/65 (04/29/25 1526)  SpO2: (!) 94 % (04/29/25 1526) Vital Signs (24h Range):  Temp:  [98 °F (36.7 °C)-100.6 °F (38.1 °C)] 98 °F (36.7 °C)  Pulse:  [81-95] 81  Resp:  [16-20] 18  SpO2:  [92 %-98 %] 94 %  BP: (123-162)/(58-81) 143/65     Weight: 131.3 kg (289 lb 7.4 oz)  Body mass index is 41.53 kg/m².  No intake or output data in the 24 hours ending 04/29/25 1655      Physical Exam  Constitutional:       General: He is not in acute distress.  Cardiovascular:      Rate and Rhythm: Normal rate.      Pulses: Normal pulses.   Pulmonary:      Effort: No respiratory distress.      Breath sounds: Normal breath sounds. No wheezing.   Abdominal:      General: Bowel sounds are normal. There is no distension.      Palpations: Abdomen is soft.      Tenderness: There is abdominal tenderness.   Musculoskeletal:      Right lower leg: No edema.      Left lower leg: No edema.   Neurological:      Mental Status: He is alert and oriented to person, place, and time. Mental status is at baseline.               Significant Labs: All pertinent labs within the past 24 hours have been reviewed.    Significant Imaging: I have reviewed all pertinent imaging results/findings within the past 24 hours.        Assessment & Plan  Hypertension  Patient's blood pressure range in the last 24 hours was: BP  Min: 123/58  Max: 162/74.The patient's inpatient  anti-hypertensive regimen is listed below:  Current Antihypertensives       Plan  - BP is controlled, Holding home amlo  Diabetes  Patient's FSGs are controlled on current medication regimen.  Last A1c reviewed-   Lab Results   Component Value Date    HGBA1C 8.5 (H) 01/08/2025     Most recent fingerstick glucose reviewed-   Recent Labs   Lab 04/28/25  2017 04/29/25  0639 04/29/25  1525   POCTGLUCOSE 172* 189* 188*     Current correctional scale  Medium  Maintain anti-hyperglycemic dose as follows-   Antihyperglycemics (From admission, onward)      Start     Stop Route Frequency Ordered    04/28/25 1827  insulin aspart U-100 pen 0-10 Units         -- SubQ Before meals & nightly PRN 04/28/25 1727          Hold Oral hypoglycemics while patient is in the hospital.  Hyperlipidemia  Continue statin    Cholelithiasis with acute cholecystitis  HIDA scan with acute cholecystitis/cystic duct obstruction.  General surgery was consulted.  Broaden antibiotics to IV Zosyn.  Follow up on blood cultures.  Plan for robotic cholecystectomy in a.m..  NPO after midnight.    VTE Risk Mitigation (From admission, onward)           Ordered     enoxaparin injection 40 mg  Every 12 hours         04/28/25 1727     IP VTE HIGH RISK PATIENT  Once         04/28/25 1727     Place sequential compression device  Until discontinued         04/28/25 1727                    Discharge Planning   REJI: 4/30/2025     Code Status: Full Code   Medical Readiness for Discharge Date:   Discharge Plan A: Home with family                        Cyndi Fortune MD  Department of Hospital Medicine   [facility] - Cardiology Stepdown

## 2025-04-29 NOTE — ASSESSMENT & PLAN NOTE
HIDA scan with acute cholecystitis/cystic duct obstruction.  General surgery was consulted.  Broaden antibiotics to IV Zosyn.  Follow up on blood cultures.  Plan for robotic cholecystectomy in a.m..  NPO after midnight.

## 2025-04-29 NOTE — ASSESSMENT & PLAN NOTE
Patient's blood pressure range in the last 24 hours was: BP  Min: 123/58  Max: 162/74.The patient's inpatient anti-hypertensive regimen is listed below:  Current Antihypertensives       Plan  - BP is controlled, Holding home amlo

## 2025-04-29 NOTE — NURSING
Patient admitted to CSU. Patient arrived to floor from ED via wheelchair with wife, no evidence of distress; patient AAO x4 at this time. Vital signs obtained. Patient voices no complaints at this time. Plan of care initiated with patient. Bed in lowest position, locked, SR up x2, call bell in reach.

## 2025-04-29 NOTE — ASSESSMENT & PLAN NOTE
Patient's FSGs are controlled on current medication regimen.  Last A1c reviewed-   Lab Results   Component Value Date    HGBA1C 8.5 (H) 01/08/2025     Most recent fingerstick glucose reviewed-   Recent Labs   Lab 04/28/25  2017 04/29/25  0639 04/29/25  1525   POCTGLUCOSE 172* 189* 188*     Current correctional scale  Medium  Maintain anti-hyperglycemic dose as follows-   Antihyperglycemics (From admission, onward)      Start     Stop Route Frequency Ordered    04/28/25 1827  insulin aspart U-100 pen 0-10 Units         -- SubQ Before meals & nightly PRN 04/28/25 1727          Hold Oral hypoglycemics while patient is in the hospital.

## 2025-04-29 NOTE — PLAN OF CARE
[facility] - Emergency Dept  Discharge Assessment    Primary Care Provider: Kirti Phelan MD     Discharge Assessment (most recent)       BRIEF DISCHARGE ASSESSMENT - 04/28/25 2010          Discharge Planning    Assessment Type Discharge Planning Assessment (P)      Resource/Environmental Concerns none (P)      Support Systems Spouse/significant other (P)      Equipment Currently Used at Home none (P)      Current Living Arrangements home (P)      Patient/Family Anticipates Transition to home (P)      Patient/Family Anticipated Services at Transition none (P)      DME Needed Upon Discharge  none (P)      Discharge Plan A Home with family (P)      Discharge Plan B Home with family (P)         Physical Activity    On average, how many days per week do you engage in moderate to strenuous exercise (like a brisk walk)? 3 days (P)      On average, how many minutes do you engage in exercise at this level? 50 min (P)         Financial Resource Strain    How hard is it for you to pay for the very basics like food, housing, medical care, and heating? Not very hard (P)         Housing Stability    In the last 12 months, was there a time when you were not able to pay the mortgage or rent on time? No (P)      At any time in the past 12 months, were you homeless or living in a shelter (including now)? No (P)         Transportation Needs    In the past 12 months, has lack of transportation kept you from medical appointments or from getting medications? No (P)      In the past 12 months, has lack of transportation kept you from meetings, work, or from getting things needed for daily living? No (P)         Food Insecurity    Within the past 12 months, you worried that your food would run out before you got the money to buy more. Never true (P)      Within the past 12 months, the food you bought just didn't last and you didn't have money to get more. Never true (P)         Stress    Do you feel stress - tense, restless, nervous, or  anxious, or unable to sleep at night because your mind is troubled all the time - these days? Rather much (P)    due to pain       Social Isolation    How often do you feel lonely or isolated from those around you?  Never (P)         Alcohol Use    Q1: How often do you have a drink containing alcohol? Never (P)      Q2: How many drinks containing alcohol do you have on a typical day when you are drinking? Patient does not drink (P)      Q3: How often do you have six or more drinks on one occasion? Never (P)         Utilities    In the past 12 months has the electric, gas, oil, or water company threatened to shut off services in your home? No (P)         Health Literacy    How often do you need to have someone help you when you read instructions, pamphlets, or other written material from your doctor or pharmacy? Never (P)                    Sw  met pt and pt's wife at bedside. Pt ambulates without assistance and independently completes his adl's. Pt has no acute case management needs at this time.       Nikko Valdes LMSW  Case Management  Emergency Department  [phone]

## 2025-04-29 NOTE — ED NOTES
LOC: The patient is awake, alert and aware of environment with an appropriate affect, the patient is oriented x 3 and speaking appropriately.   APPEARANCE: Patient appears comfortable and in no acute distress, patient is clean and well groomed.  SKIN: The skin is warm and dry, color consistent with ethnicity.   MUSCULOSKELETAL: Patient moving all extremities spontaneously, no swelling noted.  RESPIRATORY: Airway is open and patent, respirations are spontaneous, patient has a normal effort and rate, no accessory muscle use noted.  CARDIAC: Patient has a normal rate and regular rhythm, no edema noted, capillary refill < 3 seconds.   GASTRO:RUQ tender to palpation.  : Pt denies any pain or frequency with urination.  NEURO: Pt opens eyes spontaneously, behavior appropriate to situation, follows commands.

## 2025-04-29 NOTE — NURSING
Nurses Note -- 4 Eyes      4/28/2025   11:08 PM      Skin assessed during: Admit      [x] No Altered Skin Integrity Present    []Prevention Measures Documented      [] Yes- Altered Skin Integrity Present or Discovered   [] LDA Added if Not in Epic (Describe Wound)   [] New Altered Skin Integrity was Present on Admit and Documented in LDA   [] Wound Image Taken    Wound Care Consulted? No    Attending Nurse:  Jazmine Coombs RN     Second RN/Staff Member: JENN Patton

## 2025-04-30 ENCOUNTER — ANESTHESIA (OUTPATIENT)
Dept: SURGERY | Facility: HOSPITAL | Age: 65
End: 2025-04-30
Payer: MEDICARE

## 2025-04-30 PROBLEM — R10.11 RUQ ABDOMINAL PAIN: Status: RESOLVED | Noted: 2025-04-28 | Resolved: 2025-04-30

## 2025-04-30 LAB
ABSOLUTE EOSINOPHIL (OHS): 0.02 K/UL
ABSOLUTE MONOCYTE (OHS): 1.15 K/UL (ref 0.3–1)
ABSOLUTE NEUTROPHIL COUNT (OHS): 8.02 K/UL (ref 1.8–7.7)
ALBUMIN SERPL BCP-MCNC: 2.2 G/DL (ref 3.5–5.2)
ALP SERPL-CCNC: 73 UNIT/L (ref 40–150)
ALT SERPL W/O P-5'-P-CCNC: 35 UNIT/L (ref 10–44)
ANION GAP (OHS): 11 MMOL/L (ref 8–16)
AST SERPL-CCNC: 49 UNIT/L (ref 11–45)
BASOPHILS # BLD AUTO: 0.02 K/UL
BASOPHILS NFR BLD AUTO: 0.2 %
BILIRUB SERPL-MCNC: 0.8 MG/DL (ref 0.1–1)
BUN SERPL-MCNC: 29 MG/DL (ref 8–23)
CALCIUM SERPL-MCNC: 8.6 MG/DL (ref 8.7–10.5)
CHLORIDE SERPL-SCNC: 98 MMOL/L (ref 95–110)
CO2 SERPL-SCNC: 22 MMOL/L (ref 23–29)
CREAT SERPL-MCNC: 0.9 MG/DL (ref 0.5–1.4)
ERYTHROCYTE [DISTWIDTH] IN BLOOD BY AUTOMATED COUNT: 13.1 % (ref 11.5–14.5)
GFR SERPLBLD CREATININE-BSD FMLA CKD-EPI: >60 ML/MIN/1.73/M2
GLUCOSE SERPL-MCNC: 195 MG/DL (ref 70–110)
HCT VFR BLD AUTO: 36.6 % (ref 40–54)
HGB BLD-MCNC: 12.3 GM/DL (ref 14–18)
IMM GRANULOCYTES # BLD AUTO: 0.09 K/UL (ref 0–0.04)
IMM GRANULOCYTES NFR BLD AUTO: 0.9 % (ref 0–0.5)
LYMPHOCYTES # BLD AUTO: 0.48 K/UL (ref 1–4.8)
MAGNESIUM SERPL-MCNC: 2.5 MG/DL (ref 1.6–2.6)
MCH RBC QN AUTO: 30.3 PG (ref 27–31)
MCHC RBC AUTO-ENTMCNC: 33.6 G/DL (ref 32–36)
MCV RBC AUTO: 90 FL (ref 82–98)
NUCLEATED RBC (/100WBC) (OHS): 0 /100 WBC
PHOSPHATE SERPL-MCNC: 2.3 MG/DL (ref 2.7–4.5)
PLATELET # BLD AUTO: 107 K/UL (ref 150–450)
PMV BLD AUTO: 9.9 FL (ref 9.2–12.9)
POCT GLUCOSE: 186 MG/DL (ref 70–110)
POCT GLUCOSE: 205 MG/DL (ref 70–110)
POCT GLUCOSE: 227 MG/DL (ref 70–110)
POTASSIUM SERPL-SCNC: 3.4 MMOL/L (ref 3.5–5.1)
PROT SERPL-MCNC: 6.1 GM/DL (ref 6–8.4)
RBC # BLD AUTO: 4.06 M/UL (ref 4.6–6.2)
RELATIVE EOSINOPHIL (OHS): 0.2 %
RELATIVE LYMPHOCYTE (OHS): 4.9 % (ref 18–48)
RELATIVE MONOCYTE (OHS): 11.8 % (ref 4–15)
RELATIVE NEUTROPHIL (OHS): 82 % (ref 38–73)
SODIUM SERPL-SCNC: 131 MMOL/L (ref 136–145)
WBC # BLD AUTO: 9.78 K/UL (ref 3.9–12.7)

## 2025-04-30 PROCEDURE — 63600175 PHARM REV CODE 636 W HCPCS: Performed by: STUDENT IN AN ORGANIZED HEALTH CARE EDUCATION/TRAINING PROGRAM

## 2025-04-30 PROCEDURE — 36000711: Performed by: SURGERY

## 2025-04-30 PROCEDURE — 8E0W4CZ ROBOTIC ASSISTED PROCEDURE OF TRUNK REGION, PERCUTANEOUS ENDOSCOPIC APPROACH: ICD-10-PCS | Performed by: SURGERY

## 2025-04-30 PROCEDURE — 25000003 PHARM REV CODE 250: Performed by: STUDENT IN AN ORGANIZED HEALTH CARE EDUCATION/TRAINING PROGRAM

## 2025-04-30 PROCEDURE — 37000009 HC ANESTHESIA EA ADD 15 MINS: Performed by: SURGERY

## 2025-04-30 PROCEDURE — 71000033 HC RECOVERY, INTIAL HOUR: Performed by: SURGERY

## 2025-04-30 PROCEDURE — 88304 TISSUE EXAM BY PATHOLOGIST: CPT | Mod: 26,,, | Performed by: STUDENT IN AN ORGANIZED HEALTH CARE EDUCATION/TRAINING PROGRAM

## 2025-04-30 PROCEDURE — 20600001 HC STEP DOWN PRIVATE ROOM

## 2025-04-30 PROCEDURE — 63600175 PHARM REV CODE 636 W HCPCS: Performed by: SURGERY

## 2025-04-30 PROCEDURE — 36000710: Performed by: SURGERY

## 2025-04-30 PROCEDURE — 27201423 OPTIME MED/SURG SUP & DEVICES STERILE SUPPLY: Performed by: SURGERY

## 2025-04-30 PROCEDURE — 83735 ASSAY OF MAGNESIUM: CPT | Performed by: STUDENT IN AN ORGANIZED HEALTH CARE EDUCATION/TRAINING PROGRAM

## 2025-04-30 PROCEDURE — 25000003 PHARM REV CODE 250: Performed by: SURGERY

## 2025-04-30 PROCEDURE — 0FT44ZZ RESECTION OF GALLBLADDER, PERCUTANEOUS ENDOSCOPIC APPROACH: ICD-10-PCS | Performed by: SURGERY

## 2025-04-30 PROCEDURE — 63600175 PHARM REV CODE 636 W HCPCS: Performed by: UROLOGY

## 2025-04-30 PROCEDURE — 85025 COMPLETE CBC W/AUTO DIFF WBC: CPT | Performed by: STUDENT IN AN ORGANIZED HEALTH CARE EDUCATION/TRAINING PROGRAM

## 2025-04-30 PROCEDURE — 99232 SBSQ HOSP IP/OBS MODERATE 35: CPT | Mod: 57,,, | Performed by: STUDENT IN AN ORGANIZED HEALTH CARE EDUCATION/TRAINING PROGRAM

## 2025-04-30 PROCEDURE — 88304 TISSUE EXAM BY PATHOLOGIST: CPT | Mod: TC | Performed by: SURGERY

## 2025-04-30 PROCEDURE — 84100 ASSAY OF PHOSPHORUS: CPT | Performed by: STUDENT IN AN ORGANIZED HEALTH CARE EDUCATION/TRAINING PROGRAM

## 2025-04-30 PROCEDURE — 37000008 HC ANESTHESIA 1ST 15 MINUTES: Performed by: SURGERY

## 2025-04-30 PROCEDURE — 63600175 PHARM REV CODE 636 W HCPCS: Performed by: NURSE ANESTHETIST, CERTIFIED REGISTERED

## 2025-04-30 PROCEDURE — 82374 ASSAY BLOOD CARBON DIOXIDE: CPT | Performed by: STUDENT IN AN ORGANIZED HEALTH CARE EDUCATION/TRAINING PROGRAM

## 2025-04-30 PROCEDURE — 71000015 HC POSTOP RECOV 1ST HR: Performed by: SURGERY

## 2025-04-30 PROCEDURE — 25000003 PHARM REV CODE 250: Performed by: UROLOGY

## 2025-04-30 PROCEDURE — 36415 COLL VENOUS BLD VENIPUNCTURE: CPT | Performed by: STUDENT IN AN ORGANIZED HEALTH CARE EDUCATION/TRAINING PROGRAM

## 2025-04-30 PROCEDURE — 47562 LAPAROSCOPIC CHOLECYSTECTOMY: CPT | Mod: ,,, | Performed by: SURGERY

## 2025-04-30 PROCEDURE — 25000003 PHARM REV CODE 250: Performed by: NURSE ANESTHETIST, CERTIFIED REGISTERED

## 2025-04-30 RX ORDER — SUCCINYLCHOLINE CHLORIDE 20 MG/ML
INJECTION INTRAMUSCULAR; INTRAVENOUS
Status: DISCONTINUED | OUTPATIENT
Start: 2025-04-30 | End: 2025-04-30

## 2025-04-30 RX ORDER — INDOCYANINE GREEN AND WATER 25 MG
2.5 KIT INJECTION ONCE
Status: COMPLETED | OUTPATIENT
Start: 2025-04-30 | End: 2025-04-30

## 2025-04-30 RX ORDER — OXYCODONE HYDROCHLORIDE 5 MG/1
5 TABLET ORAL
Status: DISCONTINUED | OUTPATIENT
Start: 2025-04-30 | End: 2025-05-01 | Stop reason: HOSPADM

## 2025-04-30 RX ORDER — ROCURONIUM BROMIDE 10 MG/ML
INJECTION, SOLUTION INTRAVENOUS
Status: DISCONTINUED | OUTPATIENT
Start: 2025-04-30 | End: 2025-04-30

## 2025-04-30 RX ORDER — SODIUM CHLORIDE 9 MG/ML
INJECTION, SOLUTION INTRAVENOUS CONTINUOUS PRN
Status: DISCONTINUED | OUTPATIENT
Start: 2025-04-30 | End: 2025-04-30

## 2025-04-30 RX ORDER — DEXAMETHASONE SODIUM PHOSPHATE 4 MG/ML
INJECTION, SOLUTION INTRA-ARTICULAR; INTRALESIONAL; INTRAMUSCULAR; INTRAVENOUS; SOFT TISSUE
Status: DISCONTINUED | OUTPATIENT
Start: 2025-04-30 | End: 2025-04-30

## 2025-04-30 RX ORDER — GLUCAGON 1 MG
1 KIT INJECTION
Status: DISCONTINUED | OUTPATIENT
Start: 2025-04-30 | End: 2025-05-01 | Stop reason: HOSPADM

## 2025-04-30 RX ORDER — HYDROMORPHONE HYDROCHLORIDE 1 MG/ML
0.2 INJECTION, SOLUTION INTRAMUSCULAR; INTRAVENOUS; SUBCUTANEOUS EVERY 5 MIN PRN
Status: DISCONTINUED | OUTPATIENT
Start: 2025-04-30 | End: 2025-05-01 | Stop reason: HOSPADM

## 2025-04-30 RX ORDER — OXYCODONE HYDROCHLORIDE 10 MG/1
10 TABLET ORAL EVERY 6 HOURS PRN
Refills: 0 | Status: DISCONTINUED | OUTPATIENT
Start: 2025-04-30 | End: 2025-05-01 | Stop reason: HOSPADM

## 2025-04-30 RX ORDER — LIDOCAINE HYDROCHLORIDE 20 MG/ML
INJECTION INTRAVENOUS
Status: DISCONTINUED | OUTPATIENT
Start: 2025-04-30 | End: 2025-04-30

## 2025-04-30 RX ORDER — KETAMINE HCL IN 0.9 % NACL 50 MG/5 ML
SYRINGE (ML) INTRAVENOUS
Status: DISCONTINUED | OUTPATIENT
Start: 2025-04-30 | End: 2025-04-30

## 2025-04-30 RX ORDER — INDOCYANINE GREEN AND WATER 25 MG
KIT INJECTION ONCE
Status: CANCELLED | OUTPATIENT
Start: 2025-04-30 | End: 2025-04-30

## 2025-04-30 RX ORDER — ONDANSETRON HYDROCHLORIDE 2 MG/ML
INJECTION, SOLUTION INTRAVENOUS
Status: DISCONTINUED | OUTPATIENT
Start: 2025-04-30 | End: 2025-04-30

## 2025-04-30 RX ORDER — ACETAMINOPHEN 10 MG/ML
INJECTION, SOLUTION INTRAVENOUS
Status: DISCONTINUED | OUTPATIENT
Start: 2025-04-30 | End: 2025-04-30

## 2025-04-30 RX ORDER — SODIUM CHLORIDE, SODIUM LACTATE, POTASSIUM CHLORIDE, CALCIUM CHLORIDE 600; 310; 30; 20 MG/100ML; MG/100ML; MG/100ML; MG/100ML
INJECTION, SOLUTION INTRAVENOUS CONTINUOUS
Status: DISCONTINUED | OUTPATIENT
Start: 2025-04-30 | End: 2025-04-30

## 2025-04-30 RX ORDER — BUPIVACAINE HYDROCHLORIDE 2.5 MG/ML
INJECTION, SOLUTION EPIDURAL; INFILTRATION; INTRACAUDAL; PERINEURAL
Status: DISCONTINUED | OUTPATIENT
Start: 2025-04-30 | End: 2025-04-30 | Stop reason: HOSPADM

## 2025-04-30 RX ORDER — HYDROMORPHONE HYDROCHLORIDE 2 MG/ML
INJECTION, SOLUTION INTRAMUSCULAR; INTRAVENOUS; SUBCUTANEOUS
Status: DISCONTINUED | OUTPATIENT
Start: 2025-04-30 | End: 2025-04-30

## 2025-04-30 RX ORDER — POTASSIUM CHLORIDE 20 MEQ/1
40 TABLET, EXTENDED RELEASE ORAL ONCE
Status: COMPLETED | OUTPATIENT
Start: 2025-04-30 | End: 2025-04-30

## 2025-04-30 RX ORDER — PROPOFOL 10 MG/ML
VIAL (ML) INTRAVENOUS
Status: DISCONTINUED | OUTPATIENT
Start: 2025-04-30 | End: 2025-04-30

## 2025-04-30 RX ORDER — PROCHLORPERAZINE EDISYLATE 5 MG/ML
5 INJECTION INTRAMUSCULAR; INTRAVENOUS EVERY 30 MIN PRN
Status: DISCONTINUED | OUTPATIENT
Start: 2025-04-30 | End: 2025-05-01 | Stop reason: HOSPADM

## 2025-04-30 RX ORDER — ACETAMINOPHEN 500 MG
1000 TABLET ORAL EVERY 8 HOURS
Status: DISCONTINUED | OUTPATIENT
Start: 2025-04-30 | End: 2025-05-01 | Stop reason: HOSPADM

## 2025-04-30 RX ADMIN — POLYETHYLENE GLYCOL 3350 17 G: 17 POWDER, FOR SOLUTION ORAL at 08:04

## 2025-04-30 RX ADMIN — HYDROMORPHONE HYDROCHLORIDE 0.2 MG: 1 INJECTION, SOLUTION INTRAMUSCULAR; INTRAVENOUS; SUBCUTANEOUS at 06:04

## 2025-04-30 RX ADMIN — ACETAMINOPHEN 1000 MG: 10 INJECTION INTRAVENOUS at 02:04

## 2025-04-30 RX ADMIN — SODIUM CHLORIDE: 0.9 INJECTION, SOLUTION INTRAVENOUS at 02:04

## 2025-04-30 RX ADMIN — HYDROMORPHONE HYDROCHLORIDE 0.5 MG: 2 INJECTION INTRAMUSCULAR; INTRAVENOUS; SUBCUTANEOUS at 02:04

## 2025-04-30 RX ADMIN — HYDROMORPHONE HYDROCHLORIDE 0.2 MG: 1 INJECTION, SOLUTION INTRAMUSCULAR; INTRAVENOUS; SUBCUTANEOUS at 07:04

## 2025-04-30 RX ADMIN — ENOXAPARIN SODIUM 40 MG: 40 INJECTION SUBCUTANEOUS at 09:04

## 2025-04-30 RX ADMIN — HYDROMORPHONE HYDROCHLORIDE 0.3 MG: 2 INJECTION INTRAMUSCULAR; INTRAVENOUS; SUBCUTANEOUS at 06:04

## 2025-04-30 RX ADMIN — Medication 10 MG: at 03:04

## 2025-04-30 RX ADMIN — PIPERACILLIN SODIUM AND TAZOBACTAM SODIUM 4.5 G: 4; .5 INJECTION, POWDER, FOR SOLUTION INTRAVENOUS at 06:04

## 2025-04-30 RX ADMIN — SUGAMMADEX 400 MG: 100 INJECTION, SOLUTION INTRAVENOUS at 06:04

## 2025-04-30 RX ADMIN — ACETAMINOPHEN 1000 MG: 500 TABLET ORAL at 09:04

## 2025-04-30 RX ADMIN — ROCURONIUM BROMIDE 20 MG: 10 INJECTION, SOLUTION INTRAVENOUS at 02:04

## 2025-04-30 RX ADMIN — INDOCYANINE GREEN AND WATER 2.5 MG: KIT at 02:04

## 2025-04-30 RX ADMIN — ROCURONIUM BROMIDE 45 MG: 10 INJECTION, SOLUTION INTRAVENOUS at 02:04

## 2025-04-30 RX ADMIN — ROCURONIUM BROMIDE 10 MG: 10 INJECTION, SOLUTION INTRAVENOUS at 03:04

## 2025-04-30 RX ADMIN — ROCURONIUM BROMIDE 10 MG: 10 INJECTION, SOLUTION INTRAVENOUS at 05:04

## 2025-04-30 RX ADMIN — ATORVASTATIN CALCIUM 20 MG: 20 TABLET, FILM COATED ORAL at 08:04

## 2025-04-30 RX ADMIN — INSULIN ASPART 4 UNITS: 100 INJECTION, SOLUTION INTRAVENOUS; SUBCUTANEOUS at 07:04

## 2025-04-30 RX ADMIN — PIPERACILLIN SODIUM AND TAZOBACTAM SODIUM 4.5 G: 4; .5 INJECTION, POWDER, FOR SOLUTION INTRAVENOUS at 09:04

## 2025-04-30 RX ADMIN — LIDOCAINE HYDROCHLORIDE 100 MG: 20 INJECTION INTRAVENOUS at 02:04

## 2025-04-30 RX ADMIN — SUCCINYLCHOLINE 160 MG: 20 INJECTION, SOLUTION INTRAMUSCULAR; INTRAVENOUS at 02:04

## 2025-04-30 RX ADMIN — SODIUM CHLORIDE, POTASSIUM CHLORIDE, SODIUM LACTATE AND CALCIUM CHLORIDE: 600; 310; 30; 20 INJECTION, SOLUTION INTRAVENOUS at 03:04

## 2025-04-30 RX ADMIN — OXYCODONE HYDROCHLORIDE 10 MG: 10 TABLET ORAL at 07:04

## 2025-04-30 RX ADMIN — ROCURONIUM BROMIDE 5 MG: 10 INJECTION, SOLUTION INTRAVENOUS at 02:04

## 2025-04-30 RX ADMIN — PANTOPRAZOLE SODIUM 20 MG: 20 TABLET, DELAYED RELEASE ORAL at 08:04

## 2025-04-30 RX ADMIN — PIPERACILLIN SODIUM AND TAZOBACTAM SODIUM 4.5 G: 4; .5 INJECTION, POWDER, FOR SOLUTION INTRAVENOUS at 02:04

## 2025-04-30 RX ADMIN — ROCURONIUM BROMIDE 20 MG: 10 INJECTION, SOLUTION INTRAVENOUS at 04:04

## 2025-04-30 RX ADMIN — DEXAMETHASONE SODIUM PHOSPHATE 8 MG: 4 INJECTION, SOLUTION INTRAMUSCULAR; INTRAVENOUS at 02:04

## 2025-04-30 RX ADMIN — POTASSIUM CHLORIDE 40 MEQ: 1500 TABLET, EXTENDED RELEASE ORAL at 09:04

## 2025-04-30 RX ADMIN — ONDANSETRON 4 MG: 2 INJECTION INTRAMUSCULAR; INTRAVENOUS at 05:04

## 2025-04-30 RX ADMIN — PROPOFOL 150 MG: 10 INJECTION, EMULSION INTRAVENOUS at 02:04

## 2025-04-30 RX ADMIN — Medication 30 MG: at 02:04

## 2025-04-30 RX ADMIN — HYDROMORPHONE HYDROCHLORIDE 0.7 MG: 2 INJECTION INTRAMUSCULAR; INTRAVENOUS; SUBCUTANEOUS at 05:04

## 2025-04-30 RX ADMIN — Medication 10 MG: at 04:04

## 2025-04-30 NOTE — ANESTHESIA PROCEDURE NOTES
Intubation    Date/Time: 4/30/2025 2:15 PM    Performed by: Tl Gonzalez MD  Authorized by: Tl Gonzalez MD    Intubation:     Induction:  Intravenous    Intubated:  Postinduction    Mask Ventilation:  Easy mask    Attempts:  1    Attempted By:  Staff anesthesiologist    Method of Intubation:  Direct    Blade:  Oates 2    Laryngeal View Grade: Grade I - full view of cords      Difficult Airway Encountered?: No      Complications:  None    Airway Device:  Oral endotracheal tube    Airway Device Size:  7.5    Style/Cuff Inflation:  Cuffed    Inflation Amount (mL):  8    Tube secured:  22    Secured at:  The teeth    Placement Verified By:  Capnometry, Colorimetric ETCO2 device and Revisualization with laryngoscopy    Complicating Factors:  None    Findings Post-Intubation:  BS equal bilateral

## 2025-04-30 NOTE — SUBJECTIVE & OBJECTIVE
Interval History:  No further episodes of nausea/vomiting.  Abdominal pain is improving.  No fever.  Plan for cholecystectomy today per surgery    Review of Systems  Objective:     Vital Signs (Most Recent):  Temp: 98.2 °F (36.8 °C) (04/30/25 0722)  Pulse: 84 (04/30/25 0722)  Resp: 18 (04/30/25 0722)  BP: 133/79 (04/30/25 0722)  SpO2: (!) 94 % (04/30/25 0722) Vital Signs (24h Range):  Temp:  [97.9 °F (36.6 °C)-98.2 °F (36.8 °C)] 98.2 °F (36.8 °C)  Pulse:  [81-89] 84  Resp:  [16-20] 18  SpO2:  [92 %-94 %] 94 %  BP: (130-147)/(60-79) 133/79     Weight: 131 kg (288 lb 12.8 oz)  Body mass index is 41.44 kg/m².    Intake/Output Summary (Last 24 hours) at 4/30/2025 1101  Last data filed at 4/30/2025 0630  Gross per 24 hour   Intake 462 ml   Output 450 ml   Net 12 ml         Physical Exam  Constitutional:       General: He is not in acute distress.     Appearance: He is obese.   Cardiovascular:      Rate and Rhythm: Normal rate.      Pulses: Normal pulses.   Pulmonary:      Effort: No respiratory distress.      Breath sounds: Normal breath sounds. No wheezing.   Abdominal:      General: Bowel sounds are normal. There is no distension.      Palpations: Abdomen is soft.      Tenderness: There is abdominal tenderness.   Musculoskeletal:      Right lower leg: No edema.      Left lower leg: No edema.   Skin:     General: Skin is warm.   Neurological:      Mental Status: He is alert and oriented to person, place, and time. Mental status is at baseline.               Significant Labs: All pertinent labs within the past 24 hours have been reviewed.    Significant Imaging: I have reviewed all pertinent imaging results/findings within the past 24 hours.

## 2025-04-30 NOTE — PLAN OF CARE
Plan of care reviewed with patient, understanding of care verbalized. Patient remained free of falls/injuries, fall precaution in place. Patient is resting in bed with no questions or concerns at this time. Bed is in lowest locked position, call light within reach.   Problem: Diabetes Comorbidity  Goal: Blood Glucose Level Within Targeted Range  Outcome: Progressing     Problem: Fall Injury Risk  Goal: Absence of Fall and Fall-Related Injury  Outcome: Progressing     Problem: Pain Acute  Goal: Optimal Pain Control and Function  Outcome: Progressing

## 2025-04-30 NOTE — ASSESSMENT & PLAN NOTE
HIDA scan with acute cholecystitis/cystic duct obstruction.  General surgery was consulted.  Broaden antibiotics to IV Zosyn.  Follow up on blood cultures.  Plan for robotic cholecystectomy today. Monitor LFTs    Thrombocytopenia   Likely in the setting of infection.  Monitor.  No active bleeding

## 2025-04-30 NOTE — BRIEF OP NOTE
[facility] - Surgery ([facility])  Brief Operative Note    SUMMARY     Surgery Date: 4/30/2025     Surgeons and Role:     * Viktor Campbell MD - Primary     * Tae Frey MD - Resident - Assisting    Pre-op Diagnosis:  RUQ abdominal pain [R10.11]    Post-op Diagnosis:  Post-Op Diagnosis Codes:     * RUQ abdominal pain [R10.11]    Procedure(s) (LRB):  ROBOTIC CHOLECYSTECTOMY (N/A)    Anesthesia: General    Implants:  * No implants in log *    Operative Findings:   - Gangrenous cholecystitis, moderate bile spillage  - Critical view of safety obtained    Estimated Blood Loss: * No values recorded between 4/30/2025  2:06 PM and 4/30/2025  6:38 PM *    Estimated Blood Loss has not been documented. EBL = 20 mL.         Specimens:   Specimen (24h ago, onward)       Start     Ordered    04/30/25 1750  Specimen to Pathology General Surgery  RELEASE UPON ORDERING        Comments: Specimen 1: Gallbladder- Perm     References:    Click here for ordering Quick Tip   Question:  Release to patient  Answer:  Immediate    04/30/25 1750                  ID Type Source Tests Collected by Time Destination   1 : Gallbladder- Perm Tissue Gallbladder SPECIMEN TO PATHOLOGY Viktor Campbell MD 4/30/2025 1730        [id number]

## 2025-04-30 NOTE — ASSESSMENT & PLAN NOTE
Patient's FSGs are controlled on current medication regimen.  Last A1c reviewed-   Lab Results   Component Value Date    HGBA1C 8.5 (H) 01/08/2025     Most recent fingerstick glucose reviewed-   Recent Labs   Lab 04/29/25  1525 04/30/25  0637 04/30/25  0730   POCTGLUCOSE 188* 186* 205*     Current correctional scale  Medium  Maintain anti-hyperglycemic dose as follows-   Antihyperglycemics (From admission, onward)      Start     Stop Route Frequency Ordered    04/28/25 1827  insulin aspart U-100 pen 0-10 Units         -- SubQ Before meals & nightly PRN 04/28/25 1727          Hold Oral hypoglycemics while patient is in the hospital.

## 2025-04-30 NOTE — PROGRESS NOTES
[facility] - Cardiology Stepdown  General Surgery  Progress Note    Subjective:     History of Present Illness:  Luís Torres is a 65yoM with a history of hypertension, diabetes and hyperlipidemia who is currently admitted to the hospital medicine service with a 5-day history of right upper quadrant abdominal pain. The patient initially reported to the ED on 4/25/25 with upper abdominal pain. His work-up was non-revealing and he was discharged. He was seen in the outpatient GI clinic who referred him for admission and further work-up. A HIDA scan was obtained, which is consistent with acute cholecystitis. General surgery consulted for evaluation.     At the time of my exam, the patient is resting comfortably. He continues to endorse right upper quadrant abdominal pain. He states that he only gets relief with IV pain medication. There was previously associated nausea, vomiting and PO intolerance. I explained his imaging findings and the plan of care to he and his wife.     He has no history of intra-abdominal surgery. He takes no blood thinning medications.       Post-Op Info:  Procedure(s) (LRB):  ROBOTIC CHOLECYSTECTOMY (N/A)         Interval History: NAEON. Afebrile. HDS. Pain is controlled with current regimen. To OR today for cholecystectomy. No new symptoms or concerns this morning. All questions answered.       Medications:  Continuous Infusions:   lactated ringers   Intravenous Continuous 100 mL/hr at 04/30/25 0349 New Bag at 04/30/25 0349     Scheduled Meds:   aluminum-magnesium hydroxide-simethicone  30 mL Oral Once    And    LIDOcaine viscous HCl 2%  15 mL Oral Once    atorvastatin  20 mg Oral Daily    enoxparin  40 mg Subcutaneous Q12H    morphine  4 mg Intravenous Once    pantoprazole  20 mg Oral Daily    piperacillin-tazobactam (Zosyn) IV (PEDS and ADULTS) (extended infusion is not appropriate)  4.5 g Intravenous Q8H    polyethylene glycol  17 g Oral Daily     PRN Meds:  Current Facility-Administered  Medications:     acetaminophen, 650 mg, Oral, Q4H PRN    acetaminophen, 650 mg, Oral, Q8H PRN    aluminum-magnesium hydroxide-simethicone, 30 mL, Oral, QID PRN    dextrose 50%, 12.5 g, Intravenous, PRN    dextrose 50%, 25 g, Intravenous, PRN    glucagon (human recombinant), 1 mg, Intramuscular, PRN    glucose, 16 g, Oral, PRN    glucose, 24 g, Oral, PRN    ibuprofen, 400 mg, Oral, Once PRN    insulin aspart U-100, 0-10 Units, Subcutaneous, QID (AC + HS) PRN    morphine, 4 mg, Intravenous, Q4H PRN    naloxone, 0.02 mg, Intravenous, PRN    ondansetron, 4 mg, Intravenous, Q8H PRN    oxyCODONE, 5 mg, Oral, Q6H PRN    sodium chloride 0.9%, 10 mL, Intravenous, Q12H PRN     Review of patient's allergies indicates:   Allergen Reactions    No known drug allergies      Objective:     Vital Signs (Most Recent):  Temp: 98.2 °F (36.8 °C) (04/30/25 0722)  Pulse: 84 (04/30/25 0722)  Resp: 18 (04/30/25 0722)  BP: 133/79 (04/30/25 0722)  SpO2: (!) 94 % (04/30/25 0722) Vital Signs (24h Range):  Temp:  [97.9 °F (36.6 °C)-98.2 °F (36.8 °C)] 98.2 °F (36.8 °C)  Pulse:  [81-89] 84  Resp:  [16-20] 18  SpO2:  [92 %-94 %] 94 %  BP: (130-147)/(60-79) 133/79     Weight: 131 kg (288 lb 12.8 oz)  Body mass index is 41.44 kg/m².    Intake/Output - Last 3 Shifts         04/28 0700 04/29 0659 04/29 0700 04/30 0659 04/30 0700 05/01 0659    P.O.  462     IV Piggyback 1000      Total Intake(mL/kg) 1000 (7.6) 462 (3.5)     Urine (mL/kg/hr)  450 (0.1)     Total Output  450     Net +1000 +12            Urine Occurrence 1 x 1 x     Stool Occurrence 0 x 0 x              Physical Exam  Vitals and nursing note reviewed.   Constitutional:       General: He is not in acute distress.     Appearance: He is obese. He is not ill-appearing or diaphoretic.      Comments: Room air   HENT:      Head: Normocephalic and atraumatic.      Mouth/Throat:      Mouth: Mucous membranes are moist.      Pharynx: Oropharynx is clear.   Eyes:      Extraocular Movements:  Extraocular movements intact.      Conjunctiva/sclera: Conjunctivae normal.   Cardiovascular:      Rate and Rhythm: Normal rate.   Pulmonary:      Effort: Pulmonary effort is normal. No respiratory distress.   Abdominal:      General: There is no distension.      Palpations: Abdomen is soft.      Tenderness: There is no guarding or rebound.      Comments: Soft, no distension. TTP in RUQ. No peritonitic signs    Musculoskeletal:         General: No deformity.   Skin:     General: Skin is warm and dry.      Coloration: Skin is not jaundiced.   Neurological:      Mental Status: He is alert and oriented to person, place, and time.          Significant Labs:  I have reviewed all pertinent lab results within the past 24 hours.    Significant Diagnostics:  I have reviewed all pertinent imaging results/findings within the past 24 hours.  Assessment/Plan:     Cholelithiasis with acute cholecystitis  Luís Torres is a 65 year old male with a five day history of RUQ abdominal pain. HIDA consistent with acute cholecystitis. General surgery consulted    - NPO  - To OR today for robotic cholecystectomy  - informed consent obtained  - Nathan huston PRN  - Trend CBC, CMP  - Remainder of care per primary team  - Please contact general surgery with any questions, concerns, or clinical status changes    Patient and POC discussed with general surgery staff, Dr. Campbell. They are in agreement with current POC.       BERTRAM FalconC  General Surgery  [facility] - Cardiology Stepdown

## 2025-04-30 NOTE — SUBJECTIVE & OBJECTIVE
Interval History: NAEON. Afebrile. HDS. Pain is controlled with current regimen. To OR today for cholecystectomy. No new symptoms or concerns this morning. All questions answered.       Medications:  Continuous Infusions:   lactated ringers   Intravenous Continuous 100 mL/hr at 04/30/25 0349 New Bag at 04/30/25 0349     Scheduled Meds:   aluminum-magnesium hydroxide-simethicone  30 mL Oral Once    And    LIDOcaine viscous HCl 2%  15 mL Oral Once    atorvastatin  20 mg Oral Daily    enoxparin  40 mg Subcutaneous Q12H    morphine  4 mg Intravenous Once    pantoprazole  20 mg Oral Daily    piperacillin-tazobactam (Zosyn) IV (PEDS and ADULTS) (extended infusion is not appropriate)  4.5 g Intravenous Q8H    polyethylene glycol  17 g Oral Daily     PRN Meds:  Current Facility-Administered Medications:     acetaminophen, 650 mg, Oral, Q4H PRN    acetaminophen, 650 mg, Oral, Q8H PRN    aluminum-magnesium hydroxide-simethicone, 30 mL, Oral, QID PRN    dextrose 50%, 12.5 g, Intravenous, PRN    dextrose 50%, 25 g, Intravenous, PRN    glucagon (human recombinant), 1 mg, Intramuscular, PRN    glucose, 16 g, Oral, PRN    glucose, 24 g, Oral, PRN    ibuprofen, 400 mg, Oral, Once PRN    insulin aspart U-100, 0-10 Units, Subcutaneous, QID (AC + HS) PRN    morphine, 4 mg, Intravenous, Q4H PRN    naloxone, 0.02 mg, Intravenous, PRN    ondansetron, 4 mg, Intravenous, Q8H PRN    oxyCODONE, 5 mg, Oral, Q6H PRN    sodium chloride 0.9%, 10 mL, Intravenous, Q12H PRN     Review of patient's allergies indicates:   Allergen Reactions    No known drug allergies      Objective:     Vital Signs (Most Recent):  Temp: 98.2 °F (36.8 °C) (04/30/25 0722)  Pulse: 84 (04/30/25 0722)  Resp: 18 (04/30/25 0722)  BP: 133/79 (04/30/25 0722)  SpO2: (!) 94 % (04/30/25 0722) Vital Signs (24h Range):  Temp:  [97.9 °F (36.6 °C)-98.2 °F (36.8 °C)] 98.2 °F (36.8 °C)  Pulse:  [81-89] 84  Resp:  [16-20] 18  SpO2:  [92 %-94 %] 94 %  BP: (130-147)/(60-79) 133/79      Weight: 131 kg (288 lb 12.8 oz)  Body mass index is 41.44 kg/m².    Intake/Output - Last 3 Shifts         04/28 0700  04/29 0659 04/29 0700 04/30 0659 04/30 0700  05/01 0659    P.O.  462     IV Piggyback 1000      Total Intake(mL/kg) 1000 (7.6) 462 (3.5)     Urine (mL/kg/hr)  450 (0.1)     Total Output  450     Net +1000 +12            Urine Occurrence 1 x 1 x     Stool Occurrence 0 x 0 x              Physical Exam  Vitals and nursing note reviewed.   Constitutional:       General: He is not in acute distress.     Appearance: He is obese. He is not ill-appearing or diaphoretic.      Comments: Room air   HENT:      Head: Normocephalic and atraumatic.      Mouth/Throat:      Mouth: Mucous membranes are moist.      Pharynx: Oropharynx is clear.   Eyes:      Extraocular Movements: Extraocular movements intact.      Conjunctiva/sclera: Conjunctivae normal.   Cardiovascular:      Rate and Rhythm: Normal rate.   Pulmonary:      Effort: Pulmonary effort is normal. No respiratory distress.   Abdominal:      General: There is no distension.      Palpations: Abdomen is soft.      Tenderness: There is no guarding or rebound.      Comments: Soft, no distension. TTP in RUQ. No peritonitic signs    Musculoskeletal:         General: No deformity.   Skin:     General: Skin is warm and dry.      Coloration: Skin is not jaundiced.   Neurological:      Mental Status: He is alert and oriented to person, place, and time.          Significant Labs:  I have reviewed all pertinent lab results within the past 24 hours.    Significant Diagnostics:  I have reviewed all pertinent imaging results/findings within the past 24 hours.

## 2025-04-30 NOTE — ASSESSMENT & PLAN NOTE
Patient's blood pressure range in the last 24 hours was: BP  Min: 130/60  Max: 147/70.The patient's inpatient anti-hypertensive regimen is listed below:  Current Antihypertensives       Plan  - BP is controlled, Holding home amlo

## 2025-04-30 NOTE — PROGRESS NOTES
[facility] - Cardiology [facility] Medicine  Progress Note    Patient Name: Luís Torres  MRN: [id number]  Patient Class: IP- Inpatient   Admission Date: 4/28/2025  Length of Stay: 1 days  Attending Physician: Cyndi Fortune,*  Primary Care Provider: Kirti Phelan MD        Subjective     Principal Problem:<principal problem not specified>        HPI:  65 y.o. male with past medical history of hypertension, diabetes, hyperlipidemia who presented to [facility] ED on 04/28/2025 as directed by GI outpatient for evaluation of abdominal pain.  Patient reports acute onset right and left upper quadrant abdominal pain for the last 4-5 days associated with nausea/several episodes of bilious nonbloody vomiting.  He was evaluated in ED on 04/25/2025.  Ultrasound revealed Biliary sludge without evidence of gallbladder inflammatory change/acute cholecystitis.   CT abdomen revealed Gallbladder distension with biliary sludge.  No biliary ductal dilatation or evidence of acute cholecystitis. Mild duodenal wall thickening hyperattenuation without significant surrounding inflammatory change, nonspecific can be seen in the setting of inflammatory/infectious enteritis .Patient was discharged home on p.o. pain regimen.  However patient reports worsening pain over the last 1-2 days.  Associated with fever T-max 100.1° F. he was seen at GI Dr. Landaverde office this morning and was recommended to present to ED for further evaluation with HIDA scan.  Patient denied previous history of gallbladder/pancreas problems.     During evaluation in the ED, patient is hemodynamically stable.  Labs revealed WBC 12.5, hemoglobin 15, platelets 113.  Sodium 130, potassium 3.7, creatinine 1.1, AST 18, ALT 12, total bilirubin 0.8 .  patient was given 1 L NS, Toradol .ED discussed with GI on-call recommended HIDA scan and consult AES if positive.  Admitted to hospital medicine for further management    Overview/Hospital Course:  65  y.o. male with past medical history of hypertension, diabetes, hyperlipidemia who was admitted with acute cholecystitis.  HIDA scan with cystic duct obstruction and acute cholecystitis.  General surgery was consulted.  Broadened antibiotics IV Zosyn per surgery.  Follow up on blood cultures.  Plan for robotic cholecystectomy today    Interval History:  No further episodes of nausea/vomiting.  Abdominal pain is improving.  No fever.  Plan for cholecystectomy today per surgery    Review of Systems  Objective:     Vital Signs (Most Recent):  Temp: 98.2 °F (36.8 °C) (04/30/25 0722)  Pulse: 84 (04/30/25 0722)  Resp: 18 (04/30/25 0722)  BP: 133/79 (04/30/25 0722)  SpO2: (!) 94 % (04/30/25 0722) Vital Signs (24h Range):  Temp:  [97.9 °F (36.6 °C)-98.2 °F (36.8 °C)] 98.2 °F (36.8 °C)  Pulse:  [81-89] 84  Resp:  [16-20] 18  SpO2:  [92 %-94 %] 94 %  BP: (130-147)/(60-79) 133/79     Weight: 131 kg (288 lb 12.8 oz)  Body mass index is 41.44 kg/m².    Intake/Output Summary (Last 24 hours) at 4/30/2025 1101  Last data filed at 4/30/2025 0630  Gross per 24 hour   Intake 462 ml   Output 450 ml   Net 12 ml         Physical Exam  Constitutional:       General: He is not in acute distress.     Appearance: He is obese.   Cardiovascular:      Rate and Rhythm: Normal rate.      Pulses: Normal pulses.   Pulmonary:      Effort: No respiratory distress.      Breath sounds: Normal breath sounds. No wheezing.   Abdominal:      General: Bowel sounds are normal. There is no distension.      Palpations: Abdomen is soft.      Tenderness: There is abdominal tenderness.   Musculoskeletal:      Right lower leg: No edema.      Left lower leg: No edema.   Skin:     General: Skin is warm.   Neurological:      Mental Status: He is alert and oriented to person, place, and time. Mental status is at baseline.               Significant Labs: All pertinent labs within the past 24 hours have been reviewed.    Significant Imaging: I have reviewed all pertinent  imaging results/findings within the past 24 hours.        Assessment & Plan  Cholelithiasis with acute cholecystitis  HIDA scan with acute cholecystitis/cystic duct obstruction.  General surgery was consulted.  Broaden antibiotics to IV Zosyn.  Follow up on blood cultures.  Plan for robotic cholecystectomy today. Monitor LFTs    Thrombocytopenia   Likely in the setting of infection.  Monitor.  No active bleeding  Hypertension  Patient's blood pressure range in the last 24 hours was: BP  Min: 130/60  Max: 147/70.The patient's inpatient anti-hypertensive regimen is listed below:  Current Antihypertensives       Plan  - BP is controlled, Holding home amlo  Diabetes  Patient's FSGs are controlled on current medication regimen.  Last A1c reviewed-   Lab Results   Component Value Date    HGBA1C 8.5 (H) 01/08/2025     Most recent fingerstick glucose reviewed-   Recent Labs   Lab 04/29/25  1525 04/30/25  0637 04/30/25  0730   POCTGLUCOSE 188* 186* 205*     Current correctional scale  Medium  Maintain anti-hyperglycemic dose as follows-   Antihyperglycemics (From admission, onward)      Start     Stop Route Frequency Ordered    04/28/25 1827  insulin aspart U-100 pen 0-10 Units         -- SubQ Before meals & nightly PRN 04/28/25 1727          Hold Oral hypoglycemics while patient is in the hospital.  Hyperlipidemia  Continue statin    VTE Risk Mitigation (From admission, onward)           Ordered     enoxaparin injection 40 mg  Every 12 hours         04/28/25 1727     IP VTE HIGH RISK PATIENT  Once         04/28/25 1727     Place sequential compression device  Until discontinued         04/28/25 1727                    Discharge Planning   REJI: 5/2/2025     Code Status: Full Code   Medical Readiness for Discharge Date:   Discharge Plan A: Home with family                        Cyndi Fortune MD  Department of Hospital Medicine   [facility] - Cardiology Stepdown

## 2025-04-30 NOTE — OP NOTE
[facility]  General Surgery  Operative Note    DATE: 4/30/2025    SURGEON(S) AND ROLE:  Surgeons and Role:     * Viktor Campbell MD - Primary     * Tae Frey MD - Resident - Assisting    PREOPERATIVE DIAGNOSIS: RUQ abdominal pain [R10.11]    POSTOPERATIVE DIAGNOSIS: RUQ abdominal pain [R10.11]    PROCEDURE PERFORMED: Procedure(s) (LRB):  ROBOTIC CHOLECYSTECTOMY (N/A)    ANESTHESIA: General endotracheal.     ESTIMATED BLOOD LOSS: 20 mL.     SPECIMEN:   Gallbladder    COMPLICATIONS: None.      INDICATION FOR PROCEDURE: This patient presents with a history of several days of right upper quadrant pain and recent ED presentations for the same though labs and ultrasound were not consistent with cholecystitis at that time. He was seen by GI as outpatient with suspicion for cholecystitis and was referred for further work-up including HIDA scan which was consistent with cholecystitis. The risks, benefits, and alternatives of cholecystectomy were discussed with the patient and he elected to proceed.    OPERATIVE FINDINGS:   - Thick inflammatory rind with omental adhesions encasing gallbladder  - Gangrenous cholecystitis with patchy areas of purulence and seropurulent fluid surrounding  - Moderate bile spillage, no stone spillage  - Short cystic duct folded onto infundibulum  - Critical view of safety obtained    OPERATIVE PROCEDURE:   The patient was identified in preoperative holding area and brought back to the operating room. The patient was placed supine on the operating table and padded appropriately. General anesthesia and endotracheal intubation were performed by anesthesia. The patient's abdomen was prepped and draped in the standard sterile surgical fashion. A time-out was performed and all team members present agreed this was the correct procedure on the correct patient. We also confirmed administration of appropriate preoperative antibiotics which he had received on the hospital  floor.     Four planned port sites were marked across the abdomen in a slightly oblique orientation including the umbilicus and about 8cm apart. An infraumbilical curvilinear incision was made, soft tissues divided, and umbilical stalk grasped and elevated. Two interrupted 0 Vicryl stitches were placed on either side of midline. Veress needle was introduced just left of the umbilicus in the standard fashion and correct intraabdominal position confirmed by aspiration and saline drop test. The abdomen was insufflated to 15mmHg which the patient tolerated well.     A stab incision was made at the infraumbilical fascia and a robotic 8mm trocar was placed with optical entry. The abdomen was inspected and no injury from entry was noted. The three additional ports were placed in the standard fashion and the robot was docked. Instruments were introduced and positioned. Overlying omentum was swept inferiorly away from the right upper quadrant revealing the dome of the gallbladder. It was indurated and tense limiting retraction so a cystotomy was made with monopolar forceps and thin green bile suctioned. A clip was placed on the cystotomy to limit spillage. There were inflammatory adhesions and rind of omentum encasing the gallbladder that easily swept away. The rind was divided down to the level of the gallbladder both medially and laterally and carried up toward the dome and rind bluntly dissected away from the gallbladder until the infundibulum was visible.     Next, the cystic duct and cystic artery were carefully dissected out. The cystic duct was folded onto the infundibulum, did not appear dilated, and was short with what was suspected to be the common bile duct nearby just medial to the edge of the fossa. The lower 1/3rd of the fossa was cleared away and the critical view of safety was obtained. The cystic artery was clipped once proximally and once distally and sharply divided. The cystic duct was divided in the same  fashion.    The gallbladder was retracted toward the right lower quadrant and and dissected off of the cystic plate with a combination of blunt suctioning and monopolar hook. There was an edema plane with pockets of purulence. The gallbladder was placed in an endoscopic retrieval bag. The gallbladder fossa was gently irrigated and hemostasis ensured. Fluid was suctioned from the right upper quadrant. Instruments were removed and the robot was undocked.     Trocars were removed and the abdomen allowed to collapse. The specimen bag was removed from the umbilical trocar requiring extension of the fascial defect several millimeters. The defect was closed with a running 0-Vicryl. The previously placed vicryl sutures were tied to each. Incisions were closed with deep dermal 3-0 Vicryl and running subcuticular 4-0 Monocryl. The skin was cleaned, dried, and incisions dressed with Dermabond. The patient was extubated and transported to the PACU in stable condition. All sponge, instrument and needle counts were correct at the end of the case.     Dr. Campbell was present and scrubbed for the entire procedure.     Tae Frey M.D.  General Surgery PGY-IV  [facility]

## 2025-04-30 NOTE — ASSESSMENT & PLAN NOTE
Luís Torres is a 65 year old male with a five day history of RUQ abdominal pain. HIDA consistent with acute cholecystitis. General surgery consulted    - NPO  - To OR today for robotic cholecystectomy  - informed consent obtained  - Nathan huston PRN  - Trend CBC, CMP  - Remainder of care per primary team  - Please contact general surgery with any questions, concerns, or clinical status changes

## 2025-05-01 VITALS
TEMPERATURE: 98 F | OXYGEN SATURATION: 93 % | DIASTOLIC BLOOD PRESSURE: 63 MMHG | HEIGHT: 70 IN | BODY MASS INDEX: 41.35 KG/M2 | HEART RATE: 78 BPM | SYSTOLIC BLOOD PRESSURE: 132 MMHG | RESPIRATION RATE: 18 BRPM | WEIGHT: 288.81 LBS

## 2025-05-01 PROBLEM — J96.01 ACUTE HYPOXIC RESPIRATORY FAILURE: Status: ACTIVE | Noted: 2025-05-01

## 2025-05-01 LAB
ABSOLUTE EOSINOPHIL (OHS): 0.02 K/UL
ABSOLUTE MONOCYTE (OHS): 1.18 K/UL (ref 0.3–1)
ABSOLUTE NEUTROPHIL COUNT (OHS): 7.03 K/UL (ref 1.8–7.7)
ALBUMIN SERPL BCP-MCNC: 2 G/DL (ref 3.5–5.2)
ALP SERPL-CCNC: 84 UNIT/L (ref 40–150)
ALT SERPL W/O P-5'-P-CCNC: 50 UNIT/L (ref 10–44)
ANION GAP (OHS): 10 MMOL/L (ref 8–16)
AST SERPL-CCNC: 57 UNIT/L (ref 11–45)
BASOPHILS # BLD AUTO: 0.03 K/UL
BASOPHILS NFR BLD AUTO: 0.3 %
BILIRUB SERPL-MCNC: 0.7 MG/DL (ref 0.1–1)
BUN SERPL-MCNC: 29 MG/DL (ref 8–23)
CALCIUM SERPL-MCNC: 8.4 MG/DL (ref 8.7–10.5)
CHLORIDE SERPL-SCNC: 98 MMOL/L (ref 95–110)
CO2 SERPL-SCNC: 23 MMOL/L (ref 23–29)
CREAT SERPL-MCNC: 0.9 MG/DL (ref 0.5–1.4)
ERYTHROCYTE [DISTWIDTH] IN BLOOD BY AUTOMATED COUNT: 13.5 % (ref 11.5–14.5)
GFR SERPLBLD CREATININE-BSD FMLA CKD-EPI: >60 ML/MIN/1.73/M2
GLUCOSE SERPL-MCNC: 179 MG/DL (ref 70–110)
HCT VFR BLD AUTO: 36.9 % (ref 40–54)
HGB BLD-MCNC: 11.9 GM/DL (ref 14–18)
IMM GRANULOCYTES # BLD AUTO: 0.16 K/UL (ref 0–0.04)
IMM GRANULOCYTES NFR BLD AUTO: 1.8 % (ref 0–0.5)
LYMPHOCYTES # BLD AUTO: 0.5 K/UL (ref 1–4.8)
MAGNESIUM SERPL-MCNC: 2.4 MG/DL (ref 1.6–2.6)
MCH RBC QN AUTO: 29.8 PG (ref 27–31)
MCHC RBC AUTO-ENTMCNC: 32.2 G/DL (ref 32–36)
MCV RBC AUTO: 92 FL (ref 82–98)
NUCLEATED RBC (/100WBC) (OHS): 0 /100 WBC
PHOSPHATE SERPL-MCNC: 3.2 MG/DL (ref 2.7–4.5)
PLATELET # BLD AUTO: 133 K/UL (ref 150–450)
PMV BLD AUTO: 10.1 FL (ref 9.2–12.9)
POCT GLUCOSE: 173 MG/DL (ref 70–110)
POCT GLUCOSE: 200 MG/DL (ref 70–110)
POCT GLUCOSE: 201 MG/DL (ref 70–110)
POTASSIUM SERPL-SCNC: 3.9 MMOL/L (ref 3.5–5.1)
PROT SERPL-MCNC: 5.8 GM/DL (ref 6–8.4)
RBC # BLD AUTO: 4 M/UL (ref 4.6–6.2)
RELATIVE EOSINOPHIL (OHS): 0.2 %
RELATIVE LYMPHOCYTE (OHS): 5.6 % (ref 18–48)
RELATIVE MONOCYTE (OHS): 13.2 % (ref 4–15)
RELATIVE NEUTROPHIL (OHS): 78.9 % (ref 38–73)
SODIUM SERPL-SCNC: 131 MMOL/L (ref 136–145)
WBC # BLD AUTO: 8.92 K/UL (ref 3.9–12.7)

## 2025-05-01 PROCEDURE — 25000003 PHARM REV CODE 250: Performed by: STUDENT IN AN ORGANIZED HEALTH CARE EDUCATION/TRAINING PROGRAM

## 2025-05-01 PROCEDURE — 85025 COMPLETE CBC W/AUTO DIFF WBC: CPT | Performed by: STUDENT IN AN ORGANIZED HEALTH CARE EDUCATION/TRAINING PROGRAM

## 2025-05-01 PROCEDURE — 27000221 HC OXYGEN, UP TO 24 HOURS

## 2025-05-01 PROCEDURE — 36415 COLL VENOUS BLD VENIPUNCTURE: CPT | Performed by: STUDENT IN AN ORGANIZED HEALTH CARE EDUCATION/TRAINING PROGRAM

## 2025-05-01 PROCEDURE — 84100 ASSAY OF PHOSPHORUS: CPT | Performed by: STUDENT IN AN ORGANIZED HEALTH CARE EDUCATION/TRAINING PROGRAM

## 2025-05-01 PROCEDURE — 80053 COMPREHEN METABOLIC PANEL: CPT | Performed by: STUDENT IN AN ORGANIZED HEALTH CARE EDUCATION/TRAINING PROGRAM

## 2025-05-01 PROCEDURE — 83735 ASSAY OF MAGNESIUM: CPT | Performed by: STUDENT IN AN ORGANIZED HEALTH CARE EDUCATION/TRAINING PROGRAM

## 2025-05-01 PROCEDURE — 63600175 PHARM REV CODE 636 W HCPCS: Performed by: STUDENT IN AN ORGANIZED HEALTH CARE EDUCATION/TRAINING PROGRAM

## 2025-05-01 PROCEDURE — 94761 N-INVAS EAR/PLS OXIMETRY MLT: CPT

## 2025-05-01 RX ORDER — AMOXICILLIN AND CLAVULANATE POTASSIUM 875; 125 MG/1; MG/1
1 TABLET, FILM COATED ORAL EVERY 12 HOURS
Qty: 6 TABLET | Refills: 0 | Status: SHIPPED | OUTPATIENT
Start: 2025-05-01 | End: 2025-05-04

## 2025-05-01 RX ORDER — OXYCODONE HYDROCHLORIDE 5 MG/1
5 TABLET ORAL EVERY 8 HOURS PRN
Qty: 10 TABLET | Refills: 0 | Status: SHIPPED | OUTPATIENT
Start: 2025-05-01 | End: 2025-05-06

## 2025-05-01 RX ORDER — AMOXICILLIN AND CLAVULANATE POTASSIUM 875; 125 MG/1; MG/1
1 TABLET, FILM COATED ORAL EVERY 12 HOURS
Status: DISCONTINUED | OUTPATIENT
Start: 2025-05-01 | End: 2025-05-01 | Stop reason: HOSPADM

## 2025-05-01 RX ADMIN — ACETAMINOPHEN 1000 MG: 500 TABLET ORAL at 05:05

## 2025-05-01 RX ADMIN — ATORVASTATIN CALCIUM 20 MG: 20 TABLET, FILM COATED ORAL at 08:05

## 2025-05-01 RX ADMIN — MORPHINE SULFATE 4 MG: 2 INJECTION, SOLUTION INTRAMUSCULAR; INTRAVENOUS at 12:05

## 2025-05-01 RX ADMIN — POLYETHYLENE GLYCOL 3350 17 G: 17 POWDER, FOR SOLUTION ORAL at 08:05

## 2025-05-01 RX ADMIN — ACETAMINOPHEN 1000 MG: 500 TABLET ORAL at 02:05

## 2025-05-01 RX ADMIN — PANTOPRAZOLE SODIUM 20 MG: 20 TABLET, DELAYED RELEASE ORAL at 08:05

## 2025-05-01 RX ADMIN — AMOXICILLIN AND CLAVULANATE POTASSIUM 1 TABLET: 875; 125 TABLET, FILM COATED ORAL at 08:05

## 2025-05-01 RX ADMIN — ENOXAPARIN SODIUM 40 MG: 40 INJECTION SUBCUTANEOUS at 08:05

## 2025-05-01 RX ADMIN — PIPERACILLIN SODIUM AND TAZOBACTAM SODIUM 4.5 G: 4; .5 INJECTION, POWDER, FOR SOLUTION INTRAVENOUS at 02:05

## 2025-05-01 NOTE — NURSING
Date Performed: 2025 4:14 PM    1) Patient's O2 Sat on room air, while at rest: 93        If O2 sats on room air at rest are 88% or below, patient qualifies. No additional testing needed. Document N/A in steps 2 and 3. If 89% or above, complete steps 2.      2) Patient's O2 Sat on room air while exercisin        If O2 sats on room air while exercising remain 89% or above patient does not qualify, no further testing needed Document N/A in step 3. If O2 sats on room air while exercising are 88% or below, continue to step 3.      3) Patient's O2 Sat while exercising on O2: 94 at 2 LPM         (Must show improvement from #2 for patients to qualify)    If O2 sats improve on oxygen, patient qualifies for portable oxygen. If not, the patient does not qualify.

## 2025-05-01 NOTE — ANESTHESIA POSTPROCEDURE EVALUATION
Anesthesia Post Evaluation    Patient: Luís Torres    Procedure(s) Performed: Procedure(s) (LRB):  ROBOTIC CHOLECYSTECTOMY (N/A)    Final Anesthesia Type: general      Patient location during evaluation: PACU  Patient participation: Yes- Able to Participate  Level of consciousness: awake and alert  Post-procedure vital signs: reviewed and stable  Pain management: adequate  Airway patency: patent    PONV status at discharge: No PONV  Anesthetic complications: no      Cardiovascular status: hemodynamically stable  Respiratory status: spontaneous ventilation  Follow-up not needed.              Vitals Value Taken Time   /65 05/01/25 05:01   Temp 37 °C (98.6 °F) 05/01/25 05:01   Pulse 79 05/01/25 05:01   Resp 18 05/01/25 05:01   SpO2 93 % 05/01/25 05:01         Event Time   Out of Recovery 04/30/2025 19:15:00         Pain/Landen Score: Pain Rating Prior to Med Admin: 4 (5/1/2025  5:01 AM)  Pain Rating Post Med Admin: 2 (5/1/2025  6:01 AM)  Landen Score: 9 (4/30/2025  7:15 PM)

## 2025-05-01 NOTE — PLAN OF CARE
Problem: Adult Inpatient Plan of Care  Goal: Plan of Care Review  Outcome: Progressing  Goal: Patient-Specific Goal (Individualized)  Outcome: Progressing  Goal: Absence of Hospital-Acquired Illness or Injury  Outcome: Progressing  Goal: Optimal Comfort and Wellbeing  Outcome: Progressing  Goal: Readiness for Transition of Care  Outcome: Progressing     Problem: Bariatric Environmental Safety  Goal: Safety Maintained with Care  Outcome: Progressing     Problem: Diabetes Comorbidity  Goal: Blood Glucose Level Within Targeted Range  Outcome: Progressing     Problem: Fall Injury Risk  Goal: Absence of Fall and Fall-Related Injury  Outcome: Progressing     Problem: Pain Acute  Goal: Optimal Pain Control and Function  Outcome: Progressing     Problem: Wound  Goal: Optimal Coping  Outcome: Progressing  Goal: Optimal Functional Ability  Outcome: Progressing  Goal: Absence of Infection Signs and Symptoms  Outcome: Progressing  Goal: Improved Oral Intake  Outcome: Progressing  Goal: Optimal Pain Control and Function  Outcome: Progressing  Goal: Skin Health and Integrity  Outcome: Progressing  Goal: Optimal Wound Healing  Outcome: Progressing   AAOx4,O2 sats > 93% on 3L NC. Plan of care discussed with patient. Patient has no complaints of chest pain/SOB/palpitations. Pt ambulating  with assist x 1, fall precautions in place,no falls/injuries through the shift.Discussed medications and care.Patient has no questions at this time.Pt resting comfortably with no acute distress.Call light within reach,bed at lowest position.

## 2025-05-01 NOTE — ASSESSMENT & PLAN NOTE
HIDA scan with acute cholecystitis/cystic duct obstruction.      - gen surg consulted.    - s/p robotic laparascopy on 04/30, successful   - f/u with surgery in 2 days   - diet advanced  - IV zosyn pre-op. De'escalated to PO augmentin for 3 days post op

## 2025-05-01 NOTE — NURSING TRANSFER
Nursing Transfer Note      4/30/2025   7:41 PM    Reason patient is being transferred: post procedure    Transfer To: [id number]    Transfer via stretcher    Transfer with 2L to O2    Transported by RN and PCT  Order for Tele Monitor? No    Additional Lines: Oxygen    Medicines sent: insulin  pen    Any special needs or follow-up needed: routine    Patient belongings transferred with patient: No    Chart send with patient: Yes    Notified: spouse    Patient reassessed at: 1930, 04/30/2025     Upon arrival to floor: patient oriented to room, call bell in reach, and bed in lowest position

## 2025-05-01 NOTE — NURSING
Date Performed: 2025 12:07 PM    1) Patient's O2 Sat on room air, while at rest: 93        If O2 sats on room air at rest are 88% or below, patient qualifies. No additional testing needed. Document N/A in steps 2 and 3. If 89% or above, complete steps 2.      2) Patient's O2 Sat on room air while exercisin          If O2 sats on room air while exercising remain 89% or above patient does not qualify, no further testing needed Document N/A in step 3. If O2 sats on room air while exercising are 88% or below, continue to step 3.      3) Patient's O2 Sat while exercising on O2: 91 at 0.5 LPM         (Must show improvement from #2 for patients to qualify)    If O2 sats improve on oxygen, patient qualifies for portable oxygen. If not, the patient does not qualify.

## 2025-05-01 NOTE — ASSESSMENT & PLAN NOTE
Luís Torres is a 65 year old male with a five day history of RUQ abdominal pain. HIDA consistent with acute cholecystitis. Sp robotic cholecystectomy 4/30    Luís Torres is a 65 year old male with a five day history of RUQ abdominal pain. HIDA consistent with acute cholecystitis. General surgery consulted     - POD 1, doing well post op   - ADAT today  - Replete lytes PRN  - Trend CBC, CMP  - Remainder of care per primary team  - Please contact general surgery with any questions, concerns, or clinical status changes

## 2025-05-01 NOTE — PLAN OF CARE
05/01/25 1550   Medicare Message   Important Message from Medicare regarding Discharge Appeal Rights Given to patient/caregiver;Explained to patient/caregiver;Signed/date by patient/caregiver   Date IMM was signed 05/01/25   Time IMM was signed [id number]

## 2025-05-01 NOTE — SUBJECTIVE & OBJECTIVE
Interval History: POD 1 jamila rito. VSS, HDS afebrile. Pain improved. Abdomen soft. . Tolerating clears, will advance diet today as tolerated      Medications:  Continuous Infusions:      Scheduled Meds:   acetaminophen  1,000 mg Oral Q8H    amoxicillin-clavulanate 875-125mg  1 tablet Oral Q12H    atorvastatin  20 mg Oral Daily    enoxparin  40 mg Subcutaneous Q12H    pantoprazole  20 mg Oral Daily    polyethylene glycol  17 g Oral Daily     PRN Meds:  Current Facility-Administered Medications:     aluminum-magnesium hydroxide-simethicone, 30 mL, Oral, QID PRN    dextrose 50%, 12.5 g, Intravenous, PRN    dextrose 50%, 12.5 g, Intravenous, PRN    dextrose 50%, 25 g, Intravenous, PRN    glucagon (human recombinant), 1 mg, Intramuscular, PRN    glucagon (human recombinant), 1 mg, Intramuscular, PRN    glucose, 16 g, Oral, PRN    glucose, 24 g, Oral, PRN    HYDROmorphone, 0.2 mg, Intravenous, Q5 Min PRN    ibuprofen, 400 mg, Oral, Once PRN    insulin aspart U-100, 0-10 Units, Subcutaneous, QID (AC + HS) PRN    morphine, 4 mg, Intravenous, Q4H PRN    naloxone, 0.02 mg, Intravenous, PRN    ondansetron, 4 mg, Intravenous, Q8H PRN    oxyCODONE, 5 mg, Oral, Q6H PRN    oxyCODONE, 5 mg, Oral, Q3H PRN    oxyCODONE, 10 mg, Oral, Q6H PRN    prochlorperazine, 5 mg, Intravenous, Q30 Min PRN    sodium chloride 0.9%, 10 mL, Intravenous, Q12H PRN     Review of patient's allergies indicates:   Allergen Reactions    No known drug allergies      Objective:     Vital Signs (Most Recent):  Temp: 98.6 °F (37 °C) (05/01/25 0501)  Pulse: 79 (05/01/25 0501)  Resp: 18 (05/01/25 0501)  BP: (!) 141/65 (05/01/25 0501)  SpO2: (!) 93 % (05/01/25 0501) Vital Signs (24h Range):  Temp:  [97.5 °F (36.4 °C)-99 °F (37.2 °C)] 98.6 °F (37 °C)  Pulse:  [73-85] 79  Resp:  [11-20] 18  SpO2:  [92 %-96 %] 93 %  BP: (112-155)/(53-84) 141/65     Weight: 131 kg (288 lb 12.8 oz)  Body mass index is 41.44 kg/m².    Intake/Output - Last 3 Shifts         04/29  0700  04/30 0659 04/30 0700  05/01 0659 05/01 0700  05/02 0659    P.O. 462      I.V. (mL/kg)  1000 (7.6)     IV Piggyback  100     Total Intake(mL/kg) 462 (3.5) 1100 (8.4)     Urine (mL/kg/hr) 450 (0.1) 825 (0.3)     Total Output 450 825     Net +12 +275            Urine Occurrence 1 x      Stool Occurrence 0 x               Physical Exam  Vitals and nursing note reviewed.   Constitutional:       General: He is not in acute distress.     Appearance: He is obese. He is not ill-appearing or diaphoretic.      Comments: Room air   HENT:      Head: Normocephalic and atraumatic.      Mouth/Throat:      Mouth: Mucous membranes are moist.      Pharynx: Oropharynx is clear.   Eyes:      Extraocular Movements: Extraocular movements intact.      Conjunctiva/sclera: Conjunctivae normal.   Cardiovascular:      Rate and Rhythm: Normal rate.   Pulmonary:      Effort: Pulmonary effort is normal. No respiratory distress.   Abdominal:      General: There is no distension.      Palpations: Abdomen is soft.      Tenderness: There is no guarding or rebound.      Comments: Soft, no distension. Appropriately tender with CDI under dermabond   Musculoskeletal:         General: No deformity.   Skin:     General: Skin is warm and dry.      Coloration: Skin is not jaundiced.   Neurological:      Mental Status: He is alert and oriented to person, place, and time.          Significant Labs:  I have reviewed all pertinent lab results within the past 24 hours.    Significant Diagnostics:  I have reviewed all pertinent imaging results/findings within the past 24 hours.

## 2025-05-01 NOTE — PLAN OF CARE
Problem: Adult Inpatient Plan of Care  Goal: Plan of Care Review  Outcome: Met  Goal: Patient-Specific Goal (Individualized)  Outcome: Met  Goal: Absence of Hospital-Acquired Illness or Injury  Outcome: Met  Goal: Optimal Comfort and Wellbeing  Outcome: Met  Goal: Readiness for Transition of Care  Outcome: Met     Problem: Diabetes Comorbidity  Goal: Blood Glucose Level Within Targeted Range  Outcome: Met     Problem: Fall Injury Risk  Goal: Absence of Fall and Fall-Related Injury  Outcome: Met     Problem: Pain Acute  Goal: Optimal Pain Control and Function  Outcome: Met     Problem: Wound  Goal: Optimal Coping  Outcome: Met  Goal: Optimal Functional Ability  Outcome: Met  Goal: Absence of Infection Signs and Symptoms  Outcome: Met  Goal: Improved Oral Intake  Outcome: Met  Goal: Optimal Pain Control and Function  Outcome: Met  Goal: Skin Health and Integrity  Outcome: Met  Goal: Optimal Wound Healing  Outcome: Met

## 2025-05-01 NOTE — PROGRESS NOTES
[facility] - [facility]  General Surgery  Progress Note    Subjective:     History of Present Illness:  Luís Torres is a 65yoM with a history of hypertension, diabetes and hyperlipidemia who is currently admitted to the hospital medicine service with a 5-day history of right upper quadrant abdominal pain. The patient initially reported to the ED on 4/25/25 with upper abdominal pain. His work-up was non-revealing and he was discharged. He was seen in the outpatient GI clinic who referred him for admission and further work-up. A HIDA scan was obtained, which is consistent with acute cholecystitis. General surgery consulted for evaluation.     At the time of my exam, the patient is resting comfortably. He continues to endorse right upper quadrant abdominal pain. He states that he only gets relief with IV pain medication. There was previously associated nausea, vomiting and PO intolerance. I explained his imaging findings and the plan of care to he and his wife.     He has no history of intra-abdominal surgery. He takes no blood thinning medications.       Post-Op Info:  Procedure(s) (LRB):  ROBOTIC CHOLECYSTECTOMY (N/A)   1 Day Post-Op     Interval History: POD 1 robo rito. VSS, HDS afebrile. Pain improved. Abdomen soft. . Tolerating clears      Medications:  Continuous Infusions:      Scheduled Meds:   acetaminophen  1,000 mg Oral Q8H    amoxicillin-clavulanate 875-125mg  1 tablet Oral Q12H    atorvastatin  20 mg Oral Daily    enoxparin  40 mg Subcutaneous Q12H    pantoprazole  20 mg Oral Daily    polyethylene glycol  17 g Oral Daily     PRN Meds:  Current Facility-Administered Medications:     aluminum-magnesium hydroxide-simethicone, 30 mL, Oral, QID PRN    dextrose 50%, 12.5 g, Intravenous, PRN    dextrose 50%, 12.5 g, Intravenous, PRN    dextrose 50%, 25 g, Intravenous, PRN    glucagon (human recombinant), 1 mg, Intramuscular, PRN    glucagon (human recombinant), 1 mg, Intramuscular, PRN     glucose, 16 g, Oral, PRN    glucose, 24 g, Oral, PRN    HYDROmorphone, 0.2 mg, Intravenous, Q5 Min PRN    ibuprofen, 400 mg, Oral, Once PRN    insulin aspart U-100, 0-10 Units, Subcutaneous, QID (AC + HS) PRN    morphine, 4 mg, Intravenous, Q4H PRN    naloxone, 0.02 mg, Intravenous, PRN    ondansetron, 4 mg, Intravenous, Q8H PRN    oxyCODONE, 5 mg, Oral, Q6H PRN    oxyCODONE, 5 mg, Oral, Q3H PRN    oxyCODONE, 10 mg, Oral, Q6H PRN    prochlorperazine, 5 mg, Intravenous, Q30 Min PRN    sodium chloride 0.9%, 10 mL, Intravenous, Q12H PRN     Review of patient's allergies indicates:   Allergen Reactions    No known drug allergies      Objective:     Vital Signs (Most Recent):  Temp: 98.6 °F (37 °C) (05/01/25 0501)  Pulse: 79 (05/01/25 0501)  Resp: 18 (05/01/25 0501)  BP: (!) 141/65 (05/01/25 0501)  SpO2: (!) 93 % (05/01/25 0501) Vital Signs (24h Range):  Temp:  [97.5 °F (36.4 °C)-99 °F (37.2 °C)] 98.6 °F (37 °C)  Pulse:  [73-85] 79  Resp:  [11-20] 18  SpO2:  [92 %-96 %] 93 %  BP: (112-155)/(53-84) 141/65     Weight: 131 kg (288 lb 12.8 oz)  Body mass index is 41.44 kg/m².    Intake/Output - Last 3 Shifts         04/29 0700 04/30 0659 04/30 0700 05/01 0659 05/01 0700 05/02 0659    P.O. 462      I.V. (mL/kg)  1000 (7.6)     IV Piggyback  100     Total Intake(mL/kg) 462 (3.5) 1100 (8.4)     Urine (mL/kg/hr) 450 (0.1) 825 (0.3)     Total Output 450 825     Net +12 +275            Urine Occurrence 1 x      Stool Occurrence 0 x               Physical Exam  Vitals and nursing note reviewed.   Constitutional:       General: He is not in acute distress.     Appearance: He is obese. He is not ill-appearing or diaphoretic.      Comments: Room air   HENT:      Head: Normocephalic and atraumatic.      Mouth/Throat:      Mouth: Mucous membranes are moist.      Pharynx: Oropharynx is clear.   Eyes:      Extraocular Movements: Extraocular movements intact.      Conjunctiva/sclera: Conjunctivae normal.   Cardiovascular:      Rate and  Rhythm: Normal rate.   Pulmonary:      Effort: Pulmonary effort is normal. No respiratory distress.   Abdominal:      General: There is no distension.      Palpations: Abdomen is soft.      Tenderness: There is no guarding or rebound.      Comments: Soft, no distension. Appropriately tender with CDI under dermabond   Musculoskeletal:         General: No deformity.   Skin:     General: Skin is warm and dry.      Coloration: Skin is not jaundiced.   Neurological:      Mental Status: He is alert and oriented to person, place, and time.          Significant Labs:  I have reviewed all pertinent lab results within the past 24 hours.    Significant Diagnostics:  I have reviewed all pertinent imaging results/findings within the past 24 hours.  Assessment/Plan:     * Cholelithiasis with acute cholecystitis  Luís Torres is a 65 year old male with a five day history of RUQ abdominal pain. HIDA consistent with acute cholecystitis. Sp robotic cholecystectomy 4/30    Luís Torres is a 65 year old male with a five day history of RUQ abdominal pain. HIDA consistent with acute cholecystitis. General surgery consulted     - POD 1, doing well post op   - Recommend advancing diet today as tolerated  - Replete lytes PRN  - Trend CBC, CMP  - Remainder of care per primary team  - Please contact general surgery with any questions, concerns, or clinical status changes          Natalie Valentin MD  General Surgery  [facility] - [facility]

## 2025-05-01 NOTE — CARE UPDATE
General Surgery Post op instructions    - Follow up in general surgery clinic in 2 weeks. Message sent to clinic for scheduling. Clinic # is [phone]  - Remainder of care per primary team  - Please contact general surgery with any questions, concerns, or clinical status changes    Post Op Instructions:  No heavy lifting (>10lbs or a gallon of milk), strenuous exercise, or squats for 6 weeks from day of surgery.   No pushing or pulling activities such as vacuuming or raking.   Activity as tolerated.   Patient can shower, allow water to run over incisions. No soaking in bath or pools for 2 weeks. Do not pick at surgical glue, it will come off on its own.   You have been prescribed a narcotic pain medication. No driving while taking narcotics and until you can react quickly without pain. Please wean narcotic over the next few days. You may alternate tylenol and ibuprofen for additional pain control.  No straining, avoid constipation. May take OTC stool softeners as described and laxatives as needed.    Frank Fernandez PA-C  General Surgery   [facility]

## 2025-05-01 NOTE — ASSESSMENT & PLAN NOTE
Patient with Hypoxic Respiratory failure which is Acute.  he is not on home oxygen. Supplemental oxygen was provided and noted-  1L     .   Signs/symptoms of respiratory failure include- none. Contributing diagnoses includes - atalectasis Labs and images were reviewed. Patient Has not had a recent ABG. Will treat underlying causes and adjust management of respiratory failure as follows- incentive spirometry, increased mobilization.    O2 drops to 86% while walking, likely 2/2 to post op atalectasis and poor lung expansion 2/2 to pain. Will treat with Incentive spirometry, increased mobilization, and pain control. Repeated 6min WT with similar desaturations while ambulating.

## 2025-05-01 NOTE — PLAN OF CARE
[facility] - [facility] Step[facility]  Discharge Final Note    Primary Care Provider: Kirti Phelan MD    Expected Discharge Date: 5/2/2025    Patient discharged to home  via family personal transportation.     Patient's bedside nurse and family notified of the above.    Discharge Plan A and Plan B have been determined by review of patient's clinical status, future medical and therapeutic needs, and coverage/benefits for post-acute care in coordination with multidisciplinary team members.        Final Discharge Note (most recent)       Final Note - 05/01/25 1600          Final Note    Assessment Type Final Discharge Note (P)      Anticipated Discharge Disposition Home or Self Care (P)      Hospital Resources/Appts/Education Provided Provided patient/caregiver with written discharge plan information;Appointments scheduled and added to AVS (P)         Post-Acute Status    Discharge Delays None known at this time (P)                      Important Message from Medicare  Important Message from Medicare regarding Discharge Appeal Rights: Given to patient/caregiver, Explained to patient/caregiver, Signed/date by patient/caregiver     Date IMM was signed: 05/01/25  Time IMM was signed: 1554        Future Appointments   Date Time Provider Department Center   5/15/2025  3:15 PM Viktor Campbell MD [facility]   7/8/2025  8:00 AM Kirti Phelan MD [facility] scheduled post-discharge follow-up appointment and information added to AVS.

## 2025-05-01 NOTE — TRANSFER OF CARE
"Anesthesia Transfer of Care Note    Patient: Luís Torres    Procedure(s) Performed: Procedure(s) (LRB):  ROBOTIC CHOLECYSTECTOMY (N/A)    Patient location: PACU    Anesthesia Type: general    Transport from OR: Transported from OR on 6-10 L/min O2 by face mask with adequate spontaneous ventilation    Post pain: adequate analgesia    Post assessment: no apparent anesthetic complications    Post vital signs: stable    Level of consciousness: awake    Nausea/Vomiting: no nausea/vomiting    Complications: none    Transfer of care protocol was followed      Last vitals: Visit Vitals  /68 (BP Location: Left arm, Patient Position: Lying)   Pulse 81   Temp 37.1 °C (98.8 °F) (Temporal)   Resp 17   Ht 5' 10" (1.778 m)   Wt 131 kg (288 lb 12.8 oz)   SpO2 95%   BMI 41.44 kg/m²     "

## 2025-05-01 NOTE — PROGRESS NOTES
[facility]  Progress Note    Patient Name: Luís Torres  MRN: [id number]  Patient Class: IP- Inpatient   Admission Date: 4/28/2025  Length of Stay: 2 days  Attending Physician: Debby Saez MD  Primary Care Provider: Kirti Phelan MD        Subjective     Principal Problem:Cholelithiasis with acute cholecystitis        HPI:  65 y.o. male with past medical history of hypertension, diabetes, hyperlipidemia who presented to [facility] ED on 04/28/2025 as directed by GI outpatient for evaluation of abdominal pain.  Patient reports acute onset right and left upper quadrant abdominal pain for the last 4-5 days associated with nausea/several episodes of bilious nonbloody vomiting.  He was evaluated in ED on 04/25/2025.  Ultrasound revealed Biliary sludge without evidence of gallbladder inflammatory change/acute cholecystitis.   CT abdomen revealed Gallbladder distension with biliary sludge.  No biliary ductal dilatation or evidence of acute cholecystitis. Mild duodenal wall thickening hyperattenuation without significant surrounding inflammatory change, nonspecific can be seen in the setting of inflammatory/infectious enteritis .Patient was discharged home on p.o. pain regimen.  However patient reports worsening pain over the last 1-2 days.  Associated with fever T-max 100.1° F. he was seen at GI Dr. Landaverde office this morning and was recommended to present to ED for further evaluation with HIDA scan.  Patient denied previous history of gallbladder/pancreas problems.     During evaluation in the ED, patient is hemodynamically stable.  Labs revealed WBC 12.5, hemoglobin 15, platelets 113.  Sodium 130, potassium 3.7, creatinine 1.1, AST 18, ALT 12, total bilirubin 0.8 .  patient was given 1 L NS, Toradol .ED discussed with GI on-call recommended HIDA scan and consult AES if positive.  Admitted to hospital medicine for further management    Overview/Hospital  Course:  65 y.o. male with past medical history of hypertension, diabetes, hyperlipidemia who was admitted with acute cholecystitis.  HIDA scan with cystic duct obstruction and acute cholecystitis.  General surgery was consulted.  Broadened antibiotics IV Zosyn per surgery. Bd cultures negative. Underwent uncomplicated cholecystectomy on 04/30. Tolerating diet well. Abx de-escalated to 3 days of augmentin. Post op requiring 0.5 to 2L of O2 when ambulating, will discharge with home O2. Likely post op atalectasis. Educated on increased ambulation, pain control, and incentive spirometer (patient has 2 at home already)   Follow up with PCP and surgery. Has multiple walkers and DME at home as well as good family support.         Interval History:      Tolerating diet well. Post op day 1. Abx de-escalted to augmentin PO per surgery.     Does have some O2 desaturations to mid 80s while ambulating. Likely post op atalectasis. Educated on increased ambulation, pain control, and incentive spirometer (patient has 2 at home already)     Review of Systems  Objective:     Vital Signs (Most Recent):  Temp: 97.8 °F (36.6 °C) (05/01/25 1513)  Pulse: 78 (05/01/25 1513)  Resp: 18 (05/01/25 1513)  BP: 132/63 (05/01/25 1513)  SpO2: (!) 93 % (05/01/25 1117) Vital Signs (24h Range):  Temp:  [97.8 °F (36.6 °C)-99 °F (37.2 °C)] 97.8 °F (36.6 °C)  Pulse:  [73-85] 78  Resp:  [13-20] 18  SpO2:  [92 %-96 %] 93 %  BP: (112-155)/(53-84) 132/63     Weight: 131 kg (288 lb 12.8 oz)  Body mass index is 41.44 kg/m².    Intake/Output Summary (Last 24 hours) at 5/1/2025 1653  Last data filed at 5/1/2025 1513  Gross per 24 hour   Intake 722 ml   Output 1100 ml   Net -378 ml         Physical Exam  Constitutional:       General: He is not in acute distress.     Appearance: He is obese.   Cardiovascular:      Rate and Rhythm: Normal rate.      Pulses: Normal pulses.   Pulmonary:      Effort: No respiratory distress.      Breath sounds: Normal breath sounds.  No wheezing.   Abdominal:      General: Bowel sounds are normal. There is no distension.      Palpations: Abdomen is soft.      Tenderness: There is abdominal tenderness.   Musculoskeletal:      Right lower leg: No edema.      Left lower leg: No edema.   Skin:     General: Skin is warm.   Neurological:      Mental Status: He is alert and oriented to person, place, and time. Mental status is at baseline.               Significant Labs: All pertinent labs within the past 24 hours have been reviewed.    Significant Imaging: I have reviewed all pertinent imaging results/findings within the past 24 hours.        Assessment & Plan  Cholelithiasis with acute cholecystitis  HIDA scan with acute cholecystitis/cystic duct obstruction.      - gen surg consulted.    - s/p robotic laparascopy on 04/30, successful   - f/u with surgery in 2 days   - diet advanced  - IV zosyn pre-op. De'escalated to PO augmentin for 3 days post op         Acute hypoxic respiratory failure  Patient with Hypoxic Respiratory failure which is Acute.  he is not on home oxygen. Supplemental oxygen was provided and noted-  1L     .   Signs/symptoms of respiratory failure include- none. Contributing diagnoses includes - atalectasis Labs and images were reviewed. Patient Has not had a recent ABG. Will treat underlying causes and adjust management of respiratory failure as follows- incentive spirometry, increased mobilization.    O2 drops to 86% while walking, likely 2/2 to post op atalectasis and poor lung expansion 2/2 to pain. Will treat with Incentive spirometry, increased mobilization, and pain control. Repeated 6min WT with similar desaturations while ambulating.   Hypertension  Patient's blood pressure range in the last 24 hours was: BP  Min: 112/53  Max: 155/84.The patient's inpatient anti-hypertensive regimen is listed below:  Current Antihypertensives       Plan  - BP is controlled, Holding home amlo  Diabetes  Patient's FSGs are controlled on  current medication regimen.  Last A1c reviewed-   Lab Results   Component Value Date    HGBA1C 8.5 (H) 01/08/2025     Most recent fingerstick glucose reviewed-   Recent Labs   Lab 04/30/25  1923 05/01/25  0740 05/01/25  1200 05/01/25  1512   POCTGLUCOSE 227* 173* 200* 201*     Current correctional scale  Medium  Maintain anti-hyperglycemic dose as follows-   Antihyperglycemics (From admission, onward)      Start     Stop Route Frequency Ordered    04/28/25 1827  insulin aspart U-100 pen 0-10 Units         -- SubQ Before meals & nightly PRN 04/28/25 1727          Hold Oral hypoglycemics while patient is in the hospital.  Hyperlipidemia  Continue statin    VTE Risk Mitigation (From admission, onward)           Ordered     enoxaparin injection 40 mg  Every 12 hours         04/28/25 1727     IP VTE HIGH RISK PATIENT  Once         04/28/25 1727     Place sequential compression device  Until discontinued         04/28/25 1727                    Discharge Planning   REJI: 5/1/2025     Code Status: Full Code   Medical Readiness for Discharge Date: 5/1/2025  Discharge Plan A: Home with family   Discharge Delays: None known at this time                    Debby Saez MD  Department of Hospital Medicine   [facility]

## 2025-05-01 NOTE — ASSESSMENT & PLAN NOTE
Patient's blood pressure range in the last 24 hours was: BP  Min: 112/53  Max: 155/84.The patient's inpatient anti-hypertensive regimen is listed below:  Current Antihypertensives       Plan  - BP is controlled, Holding home amlo

## 2025-05-01 NOTE — SUBJECTIVE & OBJECTIVE
Interval History:      Tolerating diet well. Post op day 1. Abx de-escalted to augmentin PO per surgery.     Does have some O2 desaturations to mid 80s while ambulating. Likely post op atalectasis. Educated on increased ambulation, pain control, and incentive spirometer (patient has 2 at home already)     Review of Systems  Objective:     Vital Signs (Most Recent):  Temp: 97.8 °F (36.6 °C) (05/01/25 1513)  Pulse: 78 (05/01/25 1513)  Resp: 18 (05/01/25 1513)  BP: 132/63 (05/01/25 1513)  SpO2: (!) 93 % (05/01/25 1117) Vital Signs (24h Range):  Temp:  [97.8 °F (36.6 °C)-99 °F (37.2 °C)] 97.8 °F (36.6 °C)  Pulse:  [73-85] 78  Resp:  [13-20] 18  SpO2:  [92 %-96 %] 93 %  BP: (112-155)/(53-84) 132/63     Weight: 131 kg (288 lb 12.8 oz)  Body mass index is 41.44 kg/m².    Intake/Output Summary (Last 24 hours) at 5/1/2025 1653  Last data filed at 5/1/2025 1513  Gross per 24 hour   Intake 722 ml   Output 1100 ml   Net -378 ml         Physical Exam  Constitutional:       General: He is not in acute distress.     Appearance: He is obese.   Cardiovascular:      Rate and Rhythm: Normal rate.      Pulses: Normal pulses.   Pulmonary:      Effort: No respiratory distress.      Breath sounds: Normal breath sounds. No wheezing.   Abdominal:      General: Bowel sounds are normal. There is no distension.      Palpations: Abdomen is soft.      Tenderness: There is abdominal tenderness.   Musculoskeletal:      Right lower leg: No edema.      Left lower leg: No edema.   Skin:     General: Skin is warm.   Neurological:      Mental Status: He is alert and oriented to person, place, and time. Mental status is at baseline.               Significant Labs: All pertinent labs within the past 24 hours have been reviewed.    Significant Imaging: I have reviewed all pertinent imaging results/findings within the past 24 hours.

## 2025-05-01 NOTE — ASSESSMENT & PLAN NOTE
Patient's FSGs are controlled on current medication regimen.  Last A1c reviewed-   Lab Results   Component Value Date    HGBA1C 8.5 (H) 01/08/2025     Most recent fingerstick glucose reviewed-   Recent Labs   Lab 04/30/25  1923 05/01/25  0740 05/01/25  1200 05/01/25  1512   POCTGLUCOSE 227* 173* 200* 201*     Current correctional scale  Medium  Maintain anti-hyperglycemic dose as follows-   Antihyperglycemics (From admission, onward)      Start     Stop Route Frequency Ordered    04/28/25 1827  insulin aspart U-100 pen 0-10 Units         -- SubQ Before meals & nightly PRN 04/28/25 1727          Hold Oral hypoglycemics while patient is in the hospital.

## 2025-05-02 LAB
ESTROGEN SERPL-MCNC: ABNORMAL PG/ML
INSULIN SERPL-ACNC: ABNORMAL U[IU]/ML
LAB AP CLINICAL INFORMATION: ABNORMAL
LAB AP DIAGNOSIS CATEGORY: ABNORMAL
LAB AP GROSS DESCRIPTION: ABNORMAL
LAB AP PERFORMING LOCATION(S): ABNORMAL
LAB AP REPORT FOOTNOTES: ABNORMAL

## 2025-05-02 NOTE — DISCHARGE SUMMARY
[facility] - Cardiology [address] [facility] Medicine  Discharge Summary      Patient Name: Luís Torres  MRN: [id number]  GERRI: [id number]  Patient Class: IP- Inpatient  Admission Date: 4/28/2025  Hospital Length of Stay: 2 days  Discharge Date and Time: 5/1/2025  5:12 PM  Attending Physician: Marixa att. providers found   Discharging Provider: Debby Saez MD  Primary Care Provider: Kirti Phelan MD  [facility] Medicine Team: [facility] MED  Debby Saez MD  Primary Care Team: [facility] MED J    HPI:   65 y.o. male with past medical history of hypertension, diabetes, hyperlipidemia who presented to [facility] ED on 04/28/2025 as directed by GI outpatient for evaluation of abdominal pain.  Patient reports acute onset right and left upper quadrant abdominal pain for the last 4-5 days associated with nausea/several episodes of bilious nonbloody vomiting.  He was evaluated in ED on 04/25/2025.  Ultrasound revealed Biliary sludge without evidence of gallbladder inflammatory change/acute cholecystitis.   CT abdomen revealed Gallbladder distension with biliary sludge.  No biliary ductal dilatation or evidence of acute cholecystitis. Mild duodenal wall thickening hyperattenuation without significant surrounding inflammatory change, nonspecific can be seen in the setting of inflammatory/infectious enteritis .Patient was discharged home on p.o. pain regimen.  However patient reports worsening pain over the last 1-2 days.  Associated with fever T-max 100.1° F. he was seen at GI Dr. Landaverde office this morning and was recommended to present to ED for further evaluation with HIDA scan.  Patient denied previous history of gallbladder/pancreas problems.     During evaluation in the ED, patient is hemodynamically stable.  Labs revealed WBC 12.5, hemoglobin 15, platelets 113.  Sodium 130, potassium 3.7, creatinine 1.1, AST 18, ALT 12, total bilirubin 0.8 .  patient was given 1 L NS, Toradol .ED discussed with GI  on-call recommended HIDA scan and consult AES if positive.  Admitted to hospital medicine for further management    Procedure(s) (LRB):  ROBOTIC CHOLECYSTECTOMY (N/A)      Hospital Course:   65 y.o. male with past medical history of hypertension, diabetes, hyperlipidemia who was admitted with acute cholecystitis.  HIDA scan with cystic duct obstruction and acute cholecystitis.  General surgery was consulted.  Broadened antibiotics IV Zosyn per surgery. Bd cultures negative. Underwent uncomplicated cholecystectomy on 04/30. Tolerating diet well. Abx de-escalated to 3 days of augmentin. Post op requiring 0.5  of O2 when ambulating.  Likely post op atalectasis. Educated on increased ambulation, pain control, and incentive spirometer (patient has 2 at home already). Unable to discharge with home O2 after lengthy discussion with DME department as he is not requiring sufficient O2.  Educated on return to ED precautions. Discharged with 10 pills of oxycodone, educated patient on opiates as well as need for daily stool softener. Patient well aware.   Follow up with PCP and surgery. Has multiple walkers and DME at home as well as excellent family support.          Goals of Care Treatment Preferences:  Code Status: Full Code      SDOH Screening:  The patient was screened for utility difficulties, food insecurity, transport difficulties, housing insecurity, and interpersonal safety and there were no concerns identified this admission.     Consults:   Consults (From admission, onward)          Status Ordering Provider     Inpatient consult to General Surgery  Once        Provider:  (Not yet assigned)    Completed RODOLFO GONZALES            Assessment & Plan    Final Active Diagnoses:    Diagnosis Date Noted POA    PRINCIPAL PROBLEM:  Cholelithiasis with acute cholecystitis [K80.00] 04/29/2025 Yes    Acute hypoxic respiratory failure [J96.01] 05/01/2025 Unknown    Hypertension [I10] 04/28/2025 Yes    Diabetes [E11.9]  "04/28/2025 Yes    Hyperlipidemia [E78.5] 04/28/2025 Yes      Problems Resolved During this Admission:    Diagnosis Date Noted Date Resolved POA    RUQ abdominal pain [R10.11] 04/28/2025 04/30/2025 Yes       Discharged Condition: fair    Disposition: Home or Self Care    Follow Up:    Patient Instructions:      OXYGEN FOR HOME USE     Order Specific Question Answer Comments   Liter Flow 2    Duration With activity    Qualifying Test Performed at: Activity    Oxygen saturation at rest 93    Oxygen saturation with activity 86    Oxygen saturation with activity on oxygen 94    Portable mode: pulse dose acceptable    Mode: Portable concentrator    Route nasal cannula    Device: home concentrator with portable tanks    Length of need (in months): 3 mos    Patient condition with qualifying saturation Other - List qualifying diagnosis and code    Select a diagnosis & list the code in the comments Atelectasis pulmonary [167653]    Height: 5' 10" (1.778 m)    Weight: 131 kg (288 lb 12.8 oz)    Alternative treatment measures have been tried or considered and deemed clinically ineffective. Yes        Significant Diagnostic Studies: see chart     Pending Diagnostic Studies:       None           Medications:  Reconciled Home Medications:      Medication List        START taking these medications      amoxicillin-clavulanate 875-125mg 875-125 mg per tablet  Commonly known as: AUGMENTIN  Take 1 tablet by mouth every 12 (twelve) hours. for 3 days     oxyCODONE 5 MG immediate release tablet  Commonly known as: ROXICODONE  Take 1 tablet (5 mg total) by mouth every 8 (eight) hours as needed for Pain.            CONTINUE taking these medications      atorvastatin 20 MG tablet  Commonly known as: LIPITOR  Take 1 tablet (20 mg total) by mouth once daily.     blood sugar diagnostic Strp  Commonly known as: ONETOUCH VERIO TEST STRIPS  1 strip by Misc.(Non-Drug; Combo Route) route 2 (two) times a day.     empagliflozin 25 mg tablet  Commonly " known as: Jardiance  Take 1 tablet (25 mg total) by mouth once daily. Requesting formulary tier exemption     lancets 33 gauge Misc  1 lancet  by Misc.(Non-Drug; Combo Route) route 2 (two) times a day.     lisinopriL 20 MG tablet  Commonly known as: PRINIVIL,ZESTRIL  Take 1 tablet (20 mg total) by mouth once daily.     metFORMIN 500 MG ER 24hr tablet  Commonly known as: GLUCOPHAGE-XR  Take 2 tablets (1,000 mg total) by mouth once daily.     pantoprazole 20 MG tablet  Commonly known as: PROTONIX  Take 1 tablet (20 mg total) by mouth once daily.     tadalafiL 20 MG Tab  Commonly known as: CIALIS  Take 1 tablet (20 mg total) by mouth daily as needed.              Indwelling Lines/Drains at time of discharge:   Lines/Drains/Airways       None                   Time spent on the discharge of patient: 35 minutes         Dbeby Saez MD  Department of Hospital Medicine  [facility] - Cardiology Stepdown

## 2025-05-03 LAB
BACTERIA BLD CULT: NORMAL
BACTERIA BLD CULT: NORMAL

## 2025-05-06 ENCOUNTER — LAB VISIT (OUTPATIENT)
Dept: LAB | Facility: HOSPITAL | Age: 65
End: 2025-05-06
Attending: INTERNAL MEDICINE
Payer: MEDICARE

## 2025-05-06 ENCOUNTER — OFFICE VISIT (OUTPATIENT)
Dept: PRIMARY CARE CLINIC | Facility: CLINIC | Age: 65
End: 2025-05-06
Payer: MEDICARE

## 2025-05-06 VITALS
HEART RATE: 80 BPM | SYSTOLIC BLOOD PRESSURE: 132 MMHG | DIASTOLIC BLOOD PRESSURE: 78 MMHG | BODY MASS INDEX: 40.71 KG/M2 | WEIGHT: 284.38 LBS | HEIGHT: 70 IN | OXYGEN SATURATION: 97 %

## 2025-05-06 DIAGNOSIS — E11.65 TYPE 2 DIABETES MELLITUS WITH HYPERGLYCEMIA, WITHOUT LONG-TERM CURRENT USE OF INSULIN: ICD-10-CM

## 2025-05-06 DIAGNOSIS — R53.1 WEAKNESS GENERALIZED: ICD-10-CM

## 2025-05-06 DIAGNOSIS — K82.9 GALL BLADDER DISEASE: ICD-10-CM

## 2025-05-06 DIAGNOSIS — K80.00 CALCULUS OF GALLBLADDER WITH ACUTE CHOLECYSTITIS WITHOUT OBSTRUCTION: Primary | ICD-10-CM

## 2025-05-06 DIAGNOSIS — Z90.49 HISTORY OF CHOLECYSTECTOMY: ICD-10-CM

## 2025-05-06 LAB
ABSOLUTE EOSINOPHIL (OHS): 0.03 K/UL
ABSOLUTE MONOCYTE (OHS): 0.82 K/UL (ref 0.3–1)
ABSOLUTE NEUTROPHIL COUNT (OHS): 10.5 K/UL (ref 1.8–7.7)
ALBUMIN SERPL BCP-MCNC: 2.2 G/DL (ref 3.5–5.2)
ALP SERPL-CCNC: 81 UNIT/L (ref 40–150)
ALT SERPL W/O P-5'-P-CCNC: 27 UNIT/L (ref 10–44)
ANION GAP (OHS): 11 MMOL/L (ref 8–16)
AST SERPL-CCNC: 29 UNIT/L (ref 11–45)
BASOPHILS # BLD AUTO: 0.05 K/UL
BASOPHILS NFR BLD AUTO: 0.4 %
BILIRUB SERPL-MCNC: 0.5 MG/DL (ref 0.1–1)
BUN SERPL-MCNC: 17 MG/DL (ref 8–23)
CALCIUM SERPL-MCNC: 9 MG/DL (ref 8.7–10.5)
CHLORIDE SERPL-SCNC: 99 MMOL/L (ref 95–110)
CO2 SERPL-SCNC: 25 MMOL/L (ref 23–29)
CREAT SERPL-MCNC: 0.8 MG/DL (ref 0.5–1.4)
EAG (OHS): 194 MG/DL (ref 68–131)
ERYTHROCYTE [DISTWIDTH] IN BLOOD BY AUTOMATED COUNT: 13.2 % (ref 11.5–14.5)
GFR SERPLBLD CREATININE-BSD FMLA CKD-EPI: >60 ML/MIN/1.73/M2
GLUCOSE SERPL-MCNC: 214 MG/DL (ref 70–110)
HBA1C MFR BLD: 8.4 % (ref 4–5.6)
HCT VFR BLD AUTO: 40.1 % (ref 40–54)
HGB BLD-MCNC: 12.5 GM/DL (ref 14–18)
IMM GRANULOCYTES # BLD AUTO: 0.21 K/UL (ref 0–0.04)
IMM GRANULOCYTES NFR BLD AUTO: 1.6 % (ref 0–0.5)
LYMPHOCYTES # BLD AUTO: 1.13 K/UL (ref 1–4.8)
MCH RBC QN AUTO: 29.6 PG (ref 27–31)
MCHC RBC AUTO-ENTMCNC: 31.2 G/DL (ref 32–36)
MCV RBC AUTO: 95 FL (ref 82–98)
NUCLEATED RBC (/100WBC) (OHS): 0 /100 WBC
PLATELET # BLD AUTO: 355 K/UL (ref 150–450)
PMV BLD AUTO: 10.4 FL (ref 9.2–12.9)
POTASSIUM SERPL-SCNC: 4.3 MMOL/L (ref 3.5–5.1)
PROT SERPL-MCNC: 7 GM/DL (ref 6–8.4)
RBC # BLD AUTO: 4.22 M/UL (ref 4.6–6.2)
RELATIVE EOSINOPHIL (OHS): 0.2 %
RELATIVE LYMPHOCYTE (OHS): 8.9 % (ref 18–48)
RELATIVE MONOCYTE (OHS): 6.4 % (ref 4–15)
RELATIVE NEUTROPHIL (OHS): 82.5 % (ref 38–73)
SODIUM SERPL-SCNC: 135 MMOL/L (ref 136–145)
WBC # BLD AUTO: 12.74 K/UL (ref 3.9–12.7)

## 2025-05-06 PROCEDURE — 83036 HEMOGLOBIN GLYCOSYLATED A1C: CPT

## 2025-05-06 PROCEDURE — 82040 ASSAY OF SERUM ALBUMIN: CPT

## 2025-05-06 PROCEDURE — 99999 PR PBB SHADOW E&M-EST. PATIENT-LVL III: CPT | Mod: PBBFAC,,, | Performed by: INTERNAL MEDICINE

## 2025-05-06 PROCEDURE — 99213 OFFICE O/P EST LOW 20 MIN: CPT | Mod: PBBFAC | Performed by: INTERNAL MEDICINE

## 2025-05-06 PROCEDURE — 36415 COLL VENOUS BLD VENIPUNCTURE: CPT

## 2025-05-06 PROCEDURE — 85025 COMPLETE CBC W/AUTO DIFF WBC: CPT

## 2025-05-06 PROCEDURE — 99214 OFFICE O/P EST MOD 30 MIN: CPT | Mod: S$PBB,,, | Performed by: INTERNAL MEDICINE

## 2025-05-06 NOTE — PROGRESS NOTES
[facility] Note    Chief Complaint      Chief Complaint   Patient presents with    Hospital Follow Up     History of Present Illness      Luís Torres is a 65 y.o. male who presents today for hosp f/u of cholecystectomy.  Patient comes to appointment alone.  Ortho: [facility]    ED 4/25 for abd pain, N/V.  US with biliary sludge, CT with gallbladder distention, no cholecystitis. Back to ED on 4/28 with continued abd pain, N/V. Temp 100.1.  HIDA scan with cystic duct obstruction and acute cholecystitis. Lap rito on 4/30.  D/C'ed 5/1 on augmentin.    Having 2-3 BM's per day, firming up since stopping Colace.  Pain is bad when coughing.  Only taking tylenol for pain, every 4 hours.    Has PND, taking zyrtec and Flonase daily. Coughing up mucus. Taking mucinex. Drinking lots of water.    Problem List Items Addressed This Visit       Cholelithiasis with acute cholecystitis - Primary    Type 2 diabetes mellitus with hyperglycemia, without long-term current use of insulin    Relevant Orders    CBC Auto Differential    Comprehensive Metabolic Panel    Hemoglobin A1C     Other Visit Diagnoses         Gall bladder disease          History of cholecystectomy          Weakness generalized        Relevant Orders    Ambulatory Referral/Consult to Physical Therapy                Health Maintenance   Topic Date Due    RSV Vaccine (Age 60+ and Pregnant patients) (1 - Risk 60-74 years 1-dose series) Never done    Diabetic Eye Exam  05/19/2024    Hemoglobin A1c  04/08/2025    Foot Exam  05/29/2025    TETANUS VACCINE  01/07/2026 (Originally 1/28/1978)    Shingles Vaccine (1 of 2) 01/07/2026 (Originally 1/28/2010)    COVID-19 Vaccine (4 - 2024-25 season) 01/07/2026 (Originally 9/1/2024)    Pneumococcal Vaccines (Age 50+) (1 of 2 - PCV) 01/07/2026 (Originally 1/28/1979)    PROSTATE-SPECIFIC ANTIGEN  01/08/2026    Diabetes Urine Screening  01/08/2026    Lipid Panel  01/08/2026    Low Dose Statin  05/06/2026    Colorectal  Cancer Screening  08/30/2027    Hepatitis C Screening  Completed    HIV Screening  Completed    Influenza Vaccine  Addressed       History reviewed. No pertinent past medical history.    Past Surgical History:   Procedure Laterality Date    ADENOIDECTOMY  1965    CATARACT EXTRACTION Left     COLONOSCOPY N/A 08/30/2024    Procedure: COLONOSCOPY;  Surgeon: Chris Prieto MD;  Location: [facility] ENDOSCOPY;  Service: Endoscopy;  Laterality: N/A;  Ref by Dr RODOLFO Phelan, PEG, portal - PC  8/21/24- lvm/portal for pc-- pt returned call, pc complete. DBM    EYE SURGERY  05/2020    Cataract removal R eye    knee replacement Left     ROBOT-ASSISTED CHOLECYSTECTOMY N/A 4/30/2025    Procedure: ROBOTIC CHOLECYSTECTOMY;  Surgeon: Viktor Campbell MD;  Location: [facility] OR [address] [place] [address];  Service: General;  Laterality: N/A;    TONSILLECTOMY  1965       family history includes Diabetes in his mother; Heart disease in his father and mother; Hypertension in his mother; Vision loss in his mother.    Social History     Tobacco Use    Smoking status: Never    Smokeless tobacco: Never   Substance Use Topics    Alcohol use: Yes     Alcohol/week: 6.0 standard drinks of alcohol     Types: 4 Cans of beer, 2 Shots of liquor per week    Drug use: No       Review of Systems   Constitutional:  Negative for chills and fever.   Respiratory:  Negative for cough and shortness of breath.    Cardiovascular:  Negative for chest pain and palpitations.   Gastrointestinal:  Negative for constipation, diarrhea, nausea and vomiting.   Genitourinary:  Negative for dysuria and hematuria.   Musculoskeletal:  Negative for falls.   Neurological:  Negative for headaches.        Outpatient Encounter Medications as of 5/6/2025   Medication Sig Dispense Refill    atorvastatin (LIPITOR) 20 MG tablet Take 1 tablet (20 mg total) by mouth once daily. 90 tablet 3    blood sugar diagnostic (ONETOUCH VERIO TEST STRIPS) Strp 1 strip by Misc.(Non-Drug; Combo Route) route 2 (two) times  "a day. 200 each 1    empagliflozin (JARDIANCE) 25 mg tablet Take 1 tablet (25 mg total) by mouth once daily. Requesting formulary tier exemption 90 tablet 3    lancets 33 gauge Misc 1 lancet  by Misc.(Non-Drug; Combo Route) route 2 (two) times a day. 200 each 1    lisinopriL (PRINIVIL,ZESTRIL) 20 MG tablet Take 1 tablet (20 mg total) by mouth once daily. 90 tablet 3    metFORMIN (GLUCOPHAGE-XR) 500 MG ER 24hr tablet Take 2 tablets (1,000 mg total) by mouth once daily. 180 tablet 3    oxyCODONE (ROXICODONE) 5 MG immediate release tablet Take 1 tablet (5 mg total) by mouth every 8 (eight) hours as needed for Pain. 10 tablet 0    pantoprazole (PROTONIX) 20 MG tablet Take 1 tablet (20 mg total) by mouth once daily. 30 tablet 0    tadalafiL (CIALIS) 20 MG Tab Take 1 tablet (20 mg total) by mouth daily as needed. 30 tablet 3    [] amoxicillin-clavulanate 875-125mg (AUGMENTIN) 875-125 mg per tablet Take 1 tablet by mouth every 12 (twelve) hours. for 3 days 6 tablet 0     No facility-administered encounter medications on file as of 2025.        Review of patient's allergies indicates:   Allergen Reactions    No known drug allergies        Physical Exam      Vital Signs  Pulse: 80  SpO2: 97 %  BP: 132/78  Pain Score:   6  Pain Loc: Abdomen  Height and Weight  Height: 5' 10" (177.8 cm)  Weight: 129 kg (284 lb 6.3 oz)  BSA (Calculated - sq m): 2.52 sq meters  BMI (Calculated): 40.8  Weight in (lb) to have BMI = 25: 173.9]  Body mass index is 40.81 kg/m².    Physical Exam  Constitutional:       Appearance: He is well-developed.   HENT:      Head: Normocephalic and atraumatic.   Neck:      Thyroid: No thyromegaly.   Cardiovascular:      Rate and Rhythm: Normal rate and regular rhythm.      Heart sounds: No murmur heard.  Pulmonary:      Effort: Pulmonary effort is normal. No respiratory distress.      Breath sounds: Normal breath sounds.   Abdominal:      General: There is no distension.      Palpations: Abdomen is " "soft.      Tenderness: There is no abdominal tenderness.   Skin:     General: Skin is warm and dry.   Neurological:      Mental Status: He is alert and oriented to person, place, and time.   Psychiatric:         Behavior: Behavior normal.          Laboratory:  CBC:  Recent Labs   Lab Result Units 04/29/25 0409 04/30/25  0520 05/01/25  0423   WBC K/uL 10.65 9.78 8.92   RBC M/uL 4.38* 4.06* 4.00*   HGB gm/dL 13.5* 12.3* 11.9*   HCT % 39.9* 36.6* 36.9*   Platelet Count K/uL 106* 107* 133*   MCV fL 91 90 92   MCH pg 30.8 30.3 29.8   MCHC g/dL 33.8 33.6 32.2         CMP:  Recent Labs   Lab Result Units 04/29/25 0409 04/30/25  0521 05/01/25  0423   Glucose mg/dL 170* 195* 179*   Calcium mg/dL 8.9 8.6* 8.4*   Albumin g/dL 2.5* 2.2* 2.0*   Protein Total gm/dL 6.7 6.1 5.8*   Sodium mmol/L 131* 131* 131*   Potassium mmol/L 3.5 3.4* 3.9   CO2 mmol/L 21* 22* 23   Chloride mmol/L 96 98 98   BUN mg/dL 42* 29* 29*   ALP unit/L 66 73 84   ALT unit/L 21 35 50*   AST unit/L 33 49* 57*   Bilirubin Total mg/dL 0.8 0.8 0.7       URINALYSIS:  Recent Labs   Lab Result Units 04/25/25  0626   Color, UA  Straw   Spec Grav UA  1.030   pH, UA  6.0   Protein, UA  Negative   Bacteria, UA /HPF None   Nitrites, UA  Negative   Leukocyte Esterase, UA  Negative   Urobilinogen, UA EU/dL Negative      LIPIDS:  No results for input(s): "TSH", "HDL", "CHOL", "TRIG", "LDLCALC", "CHOLHDL", "NONHDLCHOL", "TOTALCHOLEST" in the last 2160 hours.      TSH:  No results for input(s): "TSH" in the last 2160 hours.  A1C:  No results for input(s): "HGBA1C" in the last 2160 hours.        Radiology:  No results found in the last 30 days.     Assessment/Plan     Luís Torres is a 65 y.o.male with:    1. Calculus of gallbladder with acute cholecystitis without obstruction    2. Gall bladder disease    3. History of cholecystectomy    4. Type 2 diabetes mellitus with hyperglycemia, without long-term current use of insulin  - CBC Auto Differential; Future  - " Comprehensive Metabolic Panel; Future  - Hemoglobin A1C; Future    5. Weakness generalized  - Ambulatory Referral/Consult to Physical Therapy; Future        -hold lisinopril for now, will restart if BP >140/90  -restart jardiance  -repeat labs to f/u on LFT's, due for A1C as well.  -Continue current medications and maintain follow up with specialists.    -No follow-ups on file.       Kirti Phelan MD  [facility]

## 2025-05-07 ENCOUNTER — RESULTS FOLLOW-UP (OUTPATIENT)
Dept: PRIMARY CARE CLINIC | Facility: CLINIC | Age: 65
End: 2025-05-07
Payer: MEDICARE

## 2025-05-07 DIAGNOSIS — E11.65 TYPE 2 DIABETES MELLITUS WITH HYPERGLYCEMIA, WITHOUT LONG-TERM CURRENT USE OF INSULIN: Primary | ICD-10-CM

## 2025-05-07 LAB — PLATELET BLD QL SMEAR: NORMAL

## 2025-05-08 NOTE — PHYSICIAN QUERY
Please clinically validate the diagnosis of Acute Hypoxemic Respiratory Failure. If validated, please provide additional clinical support for the diagnosis.  The respiratory condition has been ruled out

## 2025-05-15 ENCOUNTER — OFFICE VISIT (OUTPATIENT)
Dept: SURGERY | Facility: CLINIC | Age: 65
End: 2025-05-15
Payer: MEDICARE

## 2025-05-15 VITALS
HEIGHT: 70 IN | WEIGHT: 269.94 LBS | BODY MASS INDEX: 38.64 KG/M2 | SYSTOLIC BLOOD PRESSURE: 146 MMHG | DIASTOLIC BLOOD PRESSURE: 82 MMHG | HEART RATE: 73 BPM | OXYGEN SATURATION: 95 %

## 2025-05-15 DIAGNOSIS — K80.00 CALCULUS OF GALLBLADDER WITH ACUTE CHOLECYSTITIS WITHOUT OBSTRUCTION: Primary | ICD-10-CM

## 2025-05-15 PROCEDURE — 99213 OFFICE O/P EST LOW 20 MIN: CPT | Mod: PBBFAC | Performed by: SURGERY

## 2025-05-15 PROCEDURE — 99999 PR PBB SHADOW E&M-EST. PATIENT-LVL III: CPT | Mod: PBBFAC,,, | Performed by: SURGERY

## 2025-05-15 PROCEDURE — 99024 POSTOP FOLLOW-UP VISIT: CPT | Mod: GC,POP,, | Performed by: SURGERY

## 2025-05-15 NOTE — PROGRESS NOTES
Surgery Clinic Note    Subjective:     Luís Torres   No diagnosis found.  Review of patient's allergies indicates:   Allergen Reactions    No known drug allergies        Luís Torres is a 65 y.o. male who presents to clinic for follow up s/p robotic cholecystectomy.  The patient reports that he is tolerating a regular diet and having regular bowel movements.  He denies fever or chills. His pain is significantly better than pre-op. He denies drainage or redness  of his incisions.       Objective:     PHYSICAL EXAM:  Vital Signs (Most Recent)  Pulse: 73 (05/15/25 1632)  BP: (!) 146/82 (05/15/25 1632)  SpO2: 95 % (05/15/25 1632)    Physical Exam  Vitals and nursing note reviewed.   Constitutional:       Appearance: Normal appearance.   HENT:      Head: Normocephalic and atraumatic.   Cardiovascular:      Rate and Rhythm: Normal rate.   Pulmonary:      Effort: Pulmonary effort is normal.   Abdominal:      General: Abdomen is flat. There is no distension.      Palpations: Abdomen is soft.      Tenderness: There is no abdominal tenderness.      Comments: Robotic incisions healing appropriately   Skin:     General: Skin is warm and dry.      Capillary Refill: Capillary refill takes less than 2 seconds.   Neurological:      General: No focal deficit present.      Mental Status: He is alert and oriented to person, place, and time.   Psychiatric:         Mood and Affect: Mood normal.         Behavior: Behavior normal.       Pathology:  Gallbladder, cholecystectomy:  - Acute necrotizing cholecystitis  - Negative for malignancy    Assessment:     65 y.o. male s/p robotic cholecystectomy for acute gangrenous cholecystitis on 4/30/25. Doing well and recovering appropriately from surgery    Plan:     - Patient is advised to avoid heavy lifting or strenuous activity for another 2-4 weeks.  - Patient may bathe and continue to take a regular diet.   - Return to clinic as needed  - All questions answered; patient is comfortable  with follow-up plan.    Melissa Fuentes MD   [facility]

## 2025-06-23 DIAGNOSIS — Z00.00 ENCOUNTER FOR MEDICARE ANNUAL WELLNESS EXAM: ICD-10-CM

## 2025-07-07 ENCOUNTER — LAB VISIT (OUTPATIENT)
Dept: LAB | Facility: HOSPITAL | Age: 65
End: 2025-07-07
Attending: INTERNAL MEDICINE
Payer: MEDICARE

## 2025-07-07 DIAGNOSIS — E11.65 TYPE 2 DIABETES MELLITUS WITH HYPERGLYCEMIA, WITHOUT LONG-TERM CURRENT USE OF INSULIN: ICD-10-CM

## 2025-07-07 LAB
EAG (OHS): 126 MG/DL (ref 68–131)
HBA1C MFR BLD: 6 % (ref 4–5.6)

## 2025-07-07 PROCEDURE — 36415 COLL VENOUS BLD VENIPUNCTURE: CPT

## 2025-07-07 PROCEDURE — 83036 HEMOGLOBIN GLYCOSYLATED A1C: CPT

## 2025-07-07 RX ORDER — ACETAZOLAMIDE 125 MG/1
TABLET ORAL
Qty: 30 TABLET | Refills: 0 | Status: SHIPPED | OUTPATIENT
Start: 2025-07-07

## 2025-07-08 ENCOUNTER — PATIENT OUTREACH (OUTPATIENT)
Dept: ADMINISTRATIVE | Facility: HOSPITAL | Age: 65
End: 2025-07-08
Payer: MEDICARE

## 2025-07-08 ENCOUNTER — OFFICE VISIT (OUTPATIENT)
Dept: PRIMARY CARE CLINIC | Facility: CLINIC | Age: 65
End: 2025-07-08
Payer: MEDICARE

## 2025-07-08 VITALS
SYSTOLIC BLOOD PRESSURE: 138 MMHG | HEART RATE: 75 BPM | HEIGHT: 70 IN | BODY MASS INDEX: 39.39 KG/M2 | OXYGEN SATURATION: 96 % | WEIGHT: 275.13 LBS | DIASTOLIC BLOOD PRESSURE: 86 MMHG

## 2025-07-08 DIAGNOSIS — E78.1 HYPERTRIGLYCERIDEMIA: ICD-10-CM

## 2025-07-08 DIAGNOSIS — E11.59 HYPERTENSION ASSOCIATED WITH DIABETES: ICD-10-CM

## 2025-07-08 DIAGNOSIS — E66.01 CLASS 2 SEVERE OBESITY DUE TO EXCESS CALORIES WITH SERIOUS COMORBIDITY AND BODY MASS INDEX (BMI) OF 39.0 TO 39.9 IN ADULT: ICD-10-CM

## 2025-07-08 DIAGNOSIS — Z12.5 SCREENING PSA (PROSTATE SPECIFIC ANTIGEN): ICD-10-CM

## 2025-07-08 DIAGNOSIS — E11.65 TYPE 2 DIABETES MELLITUS WITH HYPERGLYCEMIA, WITHOUT LONG-TERM CURRENT USE OF INSULIN: Primary | ICD-10-CM

## 2025-07-08 DIAGNOSIS — I15.2 HYPERTENSION ASSOCIATED WITH DIABETES: ICD-10-CM

## 2025-07-08 DIAGNOSIS — E66.812 CLASS 2 SEVERE OBESITY DUE TO EXCESS CALORIES WITH SERIOUS COMORBIDITY AND BODY MASS INDEX (BMI) OF 39.0 TO 39.9 IN ADULT: ICD-10-CM

## 2025-07-08 PROBLEM — E11.9 DIABETES: Status: RESOLVED | Noted: 2025-04-28 | Resolved: 2025-07-08

## 2025-07-08 PROBLEM — E78.5 HYPERLIPIDEMIA: Status: RESOLVED | Noted: 2025-04-28 | Resolved: 2025-07-08

## 2025-07-08 PROBLEM — K80.00 CHOLELITHIASIS WITH ACUTE CHOLECYSTITIS: Status: RESOLVED | Noted: 2025-04-29 | Resolved: 2025-07-08

## 2025-07-08 PROBLEM — J96.01 ACUTE HYPOXIC RESPIRATORY FAILURE: Status: RESOLVED | Noted: 2025-05-01 | Resolved: 2025-07-08

## 2025-07-08 PROBLEM — I10 HYPERTENSION: Status: RESOLVED | Noted: 2025-04-28 | Resolved: 2025-07-08

## 2025-07-08 PROCEDURE — 99999 PR PBB SHADOW E&M-EST. PATIENT-LVL III: CPT | Mod: PBBFAC,,, | Performed by: INTERNAL MEDICINE

## 2025-07-08 PROCEDURE — 99214 OFFICE O/P EST MOD 30 MIN: CPT | Mod: S$PBB,,, | Performed by: INTERNAL MEDICINE

## 2025-07-08 PROCEDURE — 99213 OFFICE O/P EST LOW 20 MIN: CPT | Mod: PBBFAC | Performed by: INTERNAL MEDICINE

## 2025-07-08 RX ORDER — LISINOPRIL 5 MG/1
5 TABLET ORAL DAILY
Qty: 90 TABLET | Refills: 3 | Status: SHIPPED | OUTPATIENT
Start: 2025-07-08

## 2025-07-08 RX ORDER — LANCETS 33 GAUGE
1 EACH MISCELLANEOUS 2 TIMES DAILY
Qty: 200 EACH | Refills: 1 | Status: SHIPPED | OUTPATIENT
Start: 2025-07-08

## 2025-07-08 NOTE — PROGRESS NOTES
[facility] Note    Chief Complaint      Chief Complaint   Patient presents with    Follow-up     History of Present Illness      Lusí Torres is a 65 y.o. male who presents today for hosp f/u of cholecystectomy.  Patient comes to appointment alone.  Ortho: [place]    ED 4/25 for abd pain, N/V.  US with biliary sludge, CT with gallbladder distention, no cholecystitis. Back to ED on 4/28 with continued abd pain, N/V. Temp 100.1.  HIDA scan with cystic duct obstruction and acute cholecystitis. Lap rito on 4/30.  D/C'ed 5/1 on augmentin.    Having 2-3 BM's per day, firming up since stopping Colace.  Pain is bad when coughing.  Only taking tylenol for pain, every 4 hours.    Has PND, taking zyrtec and Flonase daily. Coughing up mucus. Taking mucinex. Drinking lots of water.    Problem List Items Addressed This Visit       Class 2 severe obesity due to excess calories with serious comorbidity and body mass index (BMI) of 39.0 to 39.9 in adult    Current Assessment & Plan   Lost 23 pounds earlier this year. Has been moving more and eating less.         Hypertension associated with diabetes    Current Assessment & Plan   BP controlled, on no meds, no CP/SOB/HA.  Doesn't add salt.         Relevant Medications    lisinopriL (PRINIVIL,ZESTRIL) 5 MG tablet    Hypertriglyceridemia    Current Assessment & Plan   TG in 200's on 11/2023 labs.         Type 2 diabetes mellitus with hyperglycemia, without long-term current use of insulin - Primary    Current Assessment & Plan   A1C 10.3 in 10/2024, down to 6 in 7/2025.  Stable on metformin 1000 mg ER and jardiance 25 mg, no hypoglycemia.      Had diarrhea with metformin instant release dosing.           Relevant Orders    Hemoglobin A1C    Microalbumin/Creatinine Ratio, Urine    Lipid Panel    Comprehensive Metabolic Panel    CBC Auto Differential     Other Visit Diagnoses         Screening PSA (prostate specific antigen)        Relevant Orders    PSA, SCREENING                 Health Maintenance   Topic Date Due    RSV Vaccine (Age 60+ and Pregnant patients) (1 - Risk 60-74 years 1-dose series) Never done    Diabetic Eye Exam  05/19/2024    TETANUS VACCINE  01/07/2026 (Originally 1/28/1978)    Shingles Vaccine (1 of 2) 01/07/2026 (Originally 1/28/2010)    COVID-19 Vaccine (4 - 2024-25 season) 01/07/2026 (Originally 9/1/2024)    Pneumococcal Vaccines (Age 50+) (1 of 2 - PCV) 01/07/2026 (Originally 1/28/1979)    Influenza Vaccine (1) 09/01/2025    Hemoglobin A1c  01/07/2026    PROSTATE-SPECIFIC ANTIGEN  01/08/2026    Diabetes Urine Screening  01/08/2026    Lipid Panel  01/08/2026    Low Dose Statin  07/08/2026    Foot Exam  07/08/2026    Colorectal Cancer Screening  08/30/2027    Hepatitis C Screening  Completed    HIV Screening  Completed       History reviewed. No pertinent past medical history.    Past Surgical History:   Procedure Laterality Date    ADENOIDECTOMY  1965    CATARACT EXTRACTION Left     COLONOSCOPY N/A 08/30/2024    Procedure: COLONOSCOPY;  Surgeon: Chris Prieto MD;  Location: [facility] ENDOSCOPY;  Service: Endoscopy;  Laterality: N/A;  Ref by Dr RODOLFO Phelan, PEG, portal - PC  8/21/24- [facility]/portal for pc-- pt returned call, pc complete. DBM    EYE SURGERY  05/2020    Cataract removal R eye    knee replacement Left     ROBOT-ASSISTED CHOLECYSTECTOMY N/A 4/30/2025    Procedure: ROBOTIC CHOLECYSTECTOMY;  Surgeon: Viktor Campbell MD;  Location: [address];  Service: General;  Laterality: N/A;    TONSILLECTOMY  1965       family history includes Diabetes in his mother; Heart disease in his father and mother; Hypertension in his mother; Vision loss in his mother.    Social History     Tobacco Use    Smoking status: Never    Smokeless tobacco: Never   Substance Use Topics    Alcohol use: Yes     Alcohol/week: 6.0 standard drinks of alcohol     Types: 4 Cans of beer, 2 Shots of liquor per week    Drug use: No       Review of Systems   Constitutional:  Negative  for chills and fever.   Respiratory:  Negative for cough and shortness of breath.    Cardiovascular:  Negative for chest pain and palpitations.   Gastrointestinal:  Negative for constipation, diarrhea, nausea and vomiting.   Genitourinary:  Negative for dysuria and hematuria.   Musculoskeletal:  Negative for falls.   Neurological:  Negative for headaches.        Outpatient Encounter Medications as of 7/8/2025   Medication Sig Dispense Refill    acetaZOLAMIDE (DIAMOX) 125 MG Tab Starting 2 days prior to ascent, take 1 pill by mouth twice daily. Discontinue once at peak altitude for 3 days or when descent occurs, whichever occurs first. 30 tablet 0    atorvastatin (LIPITOR) 20 MG tablet Take 1 tablet (20 mg total) by mouth once daily. 90 tablet 3    empagliflozin (JARDIANCE) 25 mg tablet Take 1 tablet (25 mg total) by mouth once daily. Requesting formulary tier exemption 90 tablet 3    metFORMIN (GLUCOPHAGE-XR) 500 MG ER 24hr tablet Take 2 tablets (1,000 mg total) by mouth once daily. 180 tablet 3    pantoprazole (PROTONIX) 20 MG tablet Take 1 tablet (20 mg total) by mouth once daily. 30 tablet 0    tadalafiL (CIALIS) 20 MG Tab Take 1 tablet (20 mg total) by mouth daily as needed. 30 tablet 3    [DISCONTINUED] blood sugar diagnostic (ONETOUCH VERIO TEST STRIPS) Strp 1 strip by Misc.(Non-Drug; Combo Route) route 2 (two) times a day. 200 each 1    [DISCONTINUED] lancets 33 gauge Misc 1 lancet  by Misc.(Non-Drug; Combo Route) route 2 (two) times a day. 200 each 1    [DISCONTINUED] lisinopriL (PRINIVIL,ZESTRIL) 20 MG tablet Take 1 tablet (20 mg total) by mouth once daily. 90 tablet 3    blood sugar diagnostic (ONETOUCH VERIO TEST STRIPS) Strp 1 strip by Misc.(Non-Drug; Combo Route) route 2 (two) times a day. 200 each 1    lancets 33 gauge Misc 1 lancet  by Misc.(Non-Drug; Combo Route) route 2 (two) times a day. 200 each 1    lisinopriL (PRINIVIL,ZESTRIL) 5 MG tablet Take 1 tablet (5 mg total) by mouth once daily. 90  "tablet 3     No facility-administered encounter medications on file as of 7/8/2025.        Review of patient's allergies indicates:   Allergen Reactions    No known drug allergies        Physical Exam      Vital Signs  Pulse: 75  SpO2: 96 %  BP: 138/86  Pain Score: 0-No pain  Height and Weight  Height: 5' 10" (177.8 cm)  Weight: 124.8 kg (275 lb 2.2 oz)  BSA (Calculated - sq m): 2.48 sq meters  BMI (Calculated): 39.5  Weight in (lb) to have BMI = 25: 173.9]  Body mass index is 39.48 kg/m².    Physical Exam  Constitutional:       Appearance: He is well-developed.   HENT:      Head: Normocephalic and atraumatic.   Neck:      Thyroid: No thyromegaly.   Cardiovascular:      Rate and Rhythm: Normal rate and regular rhythm.      Heart sounds: No murmur heard.  Pulmonary:      Effort: Pulmonary effort is normal. No respiratory distress.      Breath sounds: Normal breath sounds.   Abdominal:      General: There is no distension.      Palpations: Abdomen is soft.      Tenderness: There is no abdominal tenderness.   Skin:     General: Skin is warm and dry.   Neurological:      Mental Status: He is alert and oriented to person, place, and time.   Psychiatric:         Behavior: Behavior normal.          Laboratory:  CBC:  Recent Labs   Lab Result Units 04/30/25  0520 05/01/25  0423 05/06/25  1200   WBC K/uL 9.78 8.92 12.74*   RBC M/uL 4.06* 4.00* 4.22*   HGB gm/dL 12.3* 11.9* 12.5*   HCT % 36.6* 36.9* 40.1   Platelet Count K/uL 107* 133* 355   MCV fL 90 92 95   MCH pg 30.3 29.8 29.6   MCHC g/dL 33.6 32.2 31.2*         CMP:  Recent Labs   Lab Result Units 04/30/25  0521 05/01/25  0423 05/06/25  1200   Glucose mg/dL 195* 179* 214*   Calcium mg/dL 8.6* 8.4* 9.0   Albumin g/dL 2.2* 2.0* 2.2*   Protein Total gm/dL 6.1 5.8* 7.0   Sodium mmol/L 131* 131* 135*   Potassium mmol/L 3.4* 3.9 4.3   CO2 mmol/L 22* 23 25   Chloride mmol/L 98 98 99   BUN mg/dL 29* 29* 17   ALP unit/L 73 84 81   ALT unit/L 35 50* 27   AST unit/L 49* 57* 29 " "  Bilirubin Total mg/dL 0.8 0.7 0.5       URINALYSIS:  Recent Labs   Lab Result Units 04/25/25  0626   Color, UA  Straw   Spec Grav UA  1.030   pH, UA  6.0   Protein, UA  Negative   Bacteria, UA /HPF None   Nitrites, UA  Negative   Leukocyte Esterase, UA  Negative   Urobilinogen, UA EU/dL Negative      LIPIDS:  No results for input(s): "TSH", "HDL", "CHOL", "TRIG", "LDLCALC", "CHOLHDL", "NONHDLCHOL", "TOTALCHOLEST" in the last 2160 hours.      TSH:  No results for input(s): "TSH" in the last 2160 hours.  A1C:  Recent Labs   Lab Result Units 05/06/25  1200 07/07/25  0752   Hemoglobin A1c % 8.4* 6.0*           Radiology:  No results found in the last 30 days.     Assessment/Plan     Luís Torres is a 65 y.o.male with:    1. Type 2 diabetes mellitus with hyperglycemia, without long-term current use of insulin  - Hemoglobin A1C; Future  - Microalbumin/Creatinine Ratio, Urine; Future  - Lipid Panel; Future  - Comprehensive Metabolic Panel; Future  - CBC Auto Differential; Future    2. Hypertension associated with diabetes  - lisinopriL (PRINIVIL,ZESTRIL) 5 MG tablet; Take 1 tablet (5 mg total) by mouth once daily.  Dispense: 90 tablet; Refill: 3    3. Hypertriglyceridemia    4. Screening PSA (prostate specific antigen)  - PSA, SCREENING; Future    5. Class 2 severe obesity due to excess calories with serious comorbidity and body mass index (BMI) of 39.0 to 39.9 in adult      -get eye exam from Dr. Suresh  -continue current meds  -Continue current medications and maintain follow up with specialists.    -Follow up in about 6 months (around 1/8/2026) for follow up of medical problems.       Kirti Phelan MD  [facility]                                    "

## 2025-07-08 NOTE — ASSESSMENT & PLAN NOTE
A1C 10.3 in 10/2024, down to 6 in 7/2025.  Stable on metformin 1000 mg ER and jardiance 25 mg, no hypoglycemia.      Had diarrhea with metformin instant release dosing.     None

## 2025-07-08 NOTE — LETTER
AUTHORIZATION FOR RELEASE OF   CONFIDENTIAL INFORMATION    Dear [facility],    We are seeing Luís Torres, date of birth 1960, in the clinic at [facility]. Kirti Phelan MD is the patient's PCP. Luís Torres has an outstanding lab/procedure at the time we reviewed his chart. In order to help keep his health information updated, he has authorized us to request the following medical record(s):        (  )  MAMMOGRAM                                      (  )  COLONOSCOPY      (  )  PAP SMEAR                                          (  )  OUTSIDE LAB RESULTS     (  )  DEXA SCAN                                          ( X)  EYE EXAM            (  )  FOOT EXAM                                          (  )  ENTIRE RECORD     (  )  OUTSIDE IMMUNIZATIONS                 (  )  _______________         Please fax records to Kirti Phelan MD,  at [phone] or email to [email or web address].       If you have any questions, please contact Rui Garsia LPN at [phone].           Patient Name: Luís Torres  : 1960  Patient Phone #: [phone]

## 2025-07-08 NOTE — PROGRESS NOTES
[facility].  Chart reviewed, immunizations reconciled, Care Everywhere updated.  Rui Garsia LPN  Care Coordinator  [facility] [place]  [facility]

## 2025-07-16 ENCOUNTER — PATIENT MESSAGE (OUTPATIENT)
Dept: PRIMARY CARE CLINIC | Facility: CLINIC | Age: 65
End: 2025-07-16
Payer: MEDICARE

## 2025-07-17 NOTE — TELEPHONE ENCOUNTER
No care due was identified.  [facility] Embedded Care Due Messages. Reference number: [id number].   7/17/2025 4:44:34 PM CDT

## 2025-07-18 RX ORDER — LANCETS 33 GAUGE
1 EACH MISCELLANEOUS 2 TIMES DAILY
Qty: 200 EACH | Refills: 1 | Status: SHIPPED | OUTPATIENT
Start: 2025-07-18

## (undated) DEVICE — SOL ELECTROLUBE ANTI-STIC

## (undated) DEVICE — OBTURATOR BLADELESS 8MM XI

## (undated) DEVICE — ELECTRODE MEGADYNE RETURN DUAL

## (undated) DEVICE — DRAPE ABDOMINAL TIBURON 14X11

## (undated) DEVICE — COVER TIP CURVED SCISSORS XI

## (undated) DEVICE — TUBE SET SINGLE LUMEN FILTERED

## (undated) DEVICE — TRAY MINOR GEN SURG OMC

## (undated) DEVICE — PENCIL ROCKER SWITCH 10FT CORD

## (undated) DEVICE — DRAPE COLUMN DAVINCI XI

## (undated) DEVICE — PAD PINK TRENDELENBURG POS XL

## (undated) DEVICE — NDL HYPO REG 25G X 1 1/2

## (undated) DEVICE — DRAPE ARM DAVINCI XI

## (undated) DEVICE — BLADE SURG CARBON STEEL SZ11

## (undated) DEVICE — SYS SEE SHARP SCP ANTIFG LNG

## (undated) DEVICE — TROCAR ENDOPATH XCEL 5MM 7.5CM

## (undated) DEVICE — ELECTRODE REM PLYHSV RETURN 9

## (undated) DEVICE — IRRIGATOR ENDOWRIST XI SUCTION

## (undated) DEVICE — SEAL UNIVERSAL 5MM-8MM XI

## (undated) DEVICE — DRAPE SCOPE PILLOW WARMER

## (undated) DEVICE — BAG TISS RETRV MONARCH 10MM

## (undated) DEVICE — ADHESIVE DERMABOND ADVANCED

## (undated) DEVICE — BAG TISSUE RETRIEVAL 5MM

## (undated) DEVICE — ELECTRODE BLADE INSULATED 1 IN

## (undated) DEVICE — SYR 10CC LUER LOCK

## (undated) DEVICE — CLIP HEMO-LOK ML